# Patient Record
Sex: MALE | Race: BLACK OR AFRICAN AMERICAN | NOT HISPANIC OR LATINO | Employment: FULL TIME | URBAN - METROPOLITAN AREA
[De-identification: names, ages, dates, MRNs, and addresses within clinical notes are randomized per-mention and may not be internally consistent; named-entity substitution may affect disease eponyms.]

---

## 2023-09-30 ENCOUNTER — APPOINTMENT (OUTPATIENT)
Dept: RADIOLOGY | Facility: HOSPITAL | Age: 48
End: 2023-09-30
Payer: COMMERCIAL

## 2023-09-30 ENCOUNTER — HOSPITAL ENCOUNTER (INPATIENT)
Facility: HOSPITAL | Age: 48
LOS: 4 days | Discharge: HOME | End: 2023-10-04
Attending: SURGERY | Admitting: SURGERY
Payer: COMMERCIAL

## 2023-09-30 DIAGNOSIS — J96.00 ACUTE RESPIRATORY FAILURE, UNSPECIFIED WHETHER WITH HYPOXIA OR HYPERCAPNIA (MULTI): ICD-10-CM

## 2023-09-30 DIAGNOSIS — E87.20 METABOLIC ACIDOSIS: ICD-10-CM

## 2023-09-30 DIAGNOSIS — R56.9 SEIZURE (MULTI): ICD-10-CM

## 2023-09-30 DIAGNOSIS — R31.9 HEMATURIA: Primary | ICD-10-CM

## 2023-09-30 DIAGNOSIS — N18.6 ESRD (END STAGE RENAL DISEASE) (MULTI): ICD-10-CM

## 2023-09-30 PROBLEM — Z99.2 ESRD (END STAGE RENAL DISEASE) ON DIALYSIS (MULTI): Chronic | Status: ACTIVE | Noted: 2023-09-30

## 2023-09-30 LAB
ALBUMIN SERPL BCP-MCNC: 3.6 G/DL (ref 3.4–5)
ALBUMIN SERPL BCP-MCNC: 3.8 G/DL (ref 3.4–5)
ALBUMIN SERPL BCP-MCNC: 4.4 G/DL (ref 3.4–5)
ANION GAP BLDA CALCULATED.4IONS-SCNC: 23 MMO/L (ref 10–25)
ANION GAP BLDA CALCULATED.4IONS-SCNC: 24 MMO/L (ref 10–25)
ANION GAP SERPL CALC-SCNC: 25 MMOL/L (ref 10–20)
ANION GAP SERPL CALC-SCNC: 34 MMOL/L (ref 10–20)
ANION GAP SERPL CALC-SCNC: 41 MMOL/L (ref 10–20)
APPARATUS: ABNORMAL
APPARATUS: ABNORMAL
BASE EXCESS BLDA CALC-SCNC: -7.5 MMOL/L (ref -2–3)
BASE EXCESS BLDA CALC-SCNC: -7.8 MMOL/L (ref -2–3)
BASOPHILS # BLD AUTO: 0.06 X10*3/UL (ref 0–0.1)
BASOPHILS NFR BLD AUTO: 0.4 %
BNP SERPL-MCNC: 451 PG/ML (ref 0–99)
BODY TEMPERATURE: 37 DEGREES CELSIUS
BODY TEMPERATURE: 37 DEGREES CELSIUS
BUN SERPL-MCNC: 105 MG/DL (ref 6–23)
BUN SERPL-MCNC: 166 MG/DL (ref 6–23)
BUN SERPL-MCNC: 79 MG/DL (ref 6–23)
CA-I BLDA-SCNC: 0.84 MMOL/L (ref 1.1–1.33)
CA-I BLDA-SCNC: 0.91 MMOL/L (ref 1.1–1.33)
CALCIUM SERPL-MCNC: 4.9 MG/DL (ref 8.6–10.3)
CALCIUM SERPL-MCNC: 6.4 MG/DL (ref 8.6–10.3)
CALCIUM SERPL-MCNC: 6.5 MG/DL (ref 8.6–10.3)
CHLORIDE BLDA-SCNC: 104 MMOL/L (ref 98–107)
CHLORIDE BLDA-SCNC: 105 MMOL/L (ref 98–107)
CHLORIDE SERPL-SCNC: 103 MMOL/L (ref 98–107)
CHLORIDE SERPL-SCNC: 98 MMOL/L (ref 98–107)
CHLORIDE SERPL-SCNC: 99 MMOL/L (ref 98–107)
CO2 SERPL-SCNC: 14 MMOL/L (ref 21–32)
CO2 SERPL-SCNC: 20 MMOL/L (ref 21–32)
CO2 SERPL-SCNC: 6 MMOL/L (ref 21–32)
CREAT SERPL-MCNC: 15.72 MG/DL (ref 0.5–1.3)
CREAT SERPL-MCNC: 18.52 MG/DL (ref 0.5–1.3)
CREAT SERPL-MCNC: >25 MG/DL (ref 0.5–1.3)
EOSINOPHIL # BLD AUTO: 0.02 X10*3/UL (ref 0–0.7)
EOSINOPHIL NFR BLD AUTO: 0.1 %
ERYTHROCYTE [DISTWIDTH] IN BLOOD BY AUTOMATED COUNT: 17.5 % (ref 11.5–14.5)
ERYTHROCYTE [DISTWIDTH] IN BLOOD BY AUTOMATED COUNT: 17.7 % (ref 11.5–14.5)
GFR SERPL CREATININE-BSD FRML MDRD: 3 ML/MIN/1.73M*2
GFR SERPL CREATININE-BSD FRML MDRD: 3 ML/MIN/1.73M*2
GFR SERPL CREATININE-BSD FRML MDRD: ABNORMAL ML/MIN/{1.73_M2}
GLUCOSE BLDA-MCNC: 109 MG/DL (ref 74–99)
GLUCOSE BLDA-MCNC: 129 MG/DL (ref 74–99)
GLUCOSE SERPL-MCNC: 124 MG/DL (ref 74–99)
GLUCOSE SERPL-MCNC: 127 MG/DL (ref 74–99)
GLUCOSE SERPL-MCNC: 133 MG/DL (ref 74–99)
HCO3 BLDA-SCNC: 14.9 MMOL/L (ref 22–26)
HCO3 BLDA-SCNC: 15.3 MMOL/L (ref 22–26)
HCT VFR BLD AUTO: 23 % (ref 41–52)
HCT VFR BLD AUTO: 28.4 % (ref 41–52)
HCT VFR BLD EST: 16 % (ref 41–52)
HCT VFR BLD EST: 34 % (ref 41–52)
HGB BLD-MCNC: 7.5 G/DL (ref 13.5–17.5)
HGB BLD-MCNC: 8.7 G/DL (ref 13.5–17.5)
HGB BLDA-MCNC: 11.4 G/DL (ref 13.5–17.5)
HGB BLDA-MCNC: 5.3 G/DL (ref 13.5–17.5)
IMM GRANULOCYTES # BLD AUTO: 0.82 X10*3/UL (ref 0–0.7)
IMM GRANULOCYTES NFR BLD AUTO: 5.9 % (ref 0–0.9)
INHALED O2 CONCENTRATION: 100 %
INHALED O2 CONCENTRATION: 100 %
INR PPP: 1.2 (ref 0.9–1.1)
LACTATE BLDA-SCNC: 1.6 MMOL/L (ref 0.4–2)
LACTATE BLDA-SCNC: 2 MMOL/L (ref 0.4–2)
LACTATE SERPL-SCNC: 0.9 MMOL/L (ref 0.4–2)
LYMPHOCYTES # BLD AUTO: 0.67 X10*3/UL (ref 1.2–4.8)
LYMPHOCYTES NFR BLD AUTO: 4.9 %
MAGNESIUM SERPL-MCNC: 1.9 MG/DL (ref 1.6–2.4)
MCH RBC QN AUTO: 23.9 PG (ref 26–34)
MCHC RBC AUTO-ENTMCNC: 30.6 G/DL (ref 32–36)
MCHC RBC AUTO-ENTMCNC: 32.6 G/DL (ref 32–36)
MCV RBC AUTO: 73 FL (ref 80–100)
MCV RBC AUTO: 78 FL (ref 80–100)
MONOCYTES # BLD AUTO: 1.17 X10*3/UL (ref 0.1–1)
MONOCYTES NFR BLD AUTO: 8.5 %
NEUTROPHILS # BLD AUTO: 11.05 X10*3/UL (ref 1.2–7.7)
NEUTROPHILS NFR BLD AUTO: 80.2 %
NRBC BLD-RTO: 1.5 /100 WBCS (ref 0–0)
OXYHGB MFR BLDA: 97.8 % (ref 94–98)
OXYHGB MFR BLDA: 98 % (ref 94–98)
PCO2 BLDA: 21 MM HG (ref 38–42)
PCO2 BLDA: 22 MM HG (ref 38–42)
PH BLDA: 7.44 PH (ref 7.38–7.42)
PH BLDA: 7.47 PH (ref 7.38–7.42)
PHOSPHATE SERPL-MCNC: 15.1 MG/DL (ref 2.5–4.9)
PHOSPHATE SERPL-MCNC: 6.5 MG/DL (ref 2.5–4.9)
PHOSPHATE SERPL-MCNC: 7.8 MG/DL (ref 2.5–4.9)
PLATELET # BLD AUTO: 220 X10*3/UL (ref 150–450)
PLATELET # BLD AUTO: 272 X10*3/UL (ref 150–450)
PMV BLD AUTO: 8.8 FL (ref 7.5–11.5)
PMV BLD AUTO: 9.7 FL (ref 7.5–11.5)
PO2 BLDA: 211 MM HG (ref 85–95)
PO2 BLDA: 306 MM HG (ref 85–95)
POTASSIUM BLDA-SCNC: 2.4 MMOL/L (ref 3.5–5.3)
POTASSIUM BLDA-SCNC: 2.6 MMOL/L (ref 3.5–5.3)
POTASSIUM SERPL-SCNC: 2.8 MMOL/L (ref 3.5–5.3)
POTASSIUM SERPL-SCNC: 3 MMOL/L (ref 3.5–5.3)
POTASSIUM SERPL-SCNC: 3.4 MMOL/L (ref 3.5–5.3)
PROTHROMBIN TIME: 13.7 SECONDS (ref 9.8–12.8)
RBC # BLD AUTO: 3.14 X10*6/UL (ref 4.5–5.9)
RBC # BLD AUTO: 3.66 X10*6/UL (ref 4.5–5.9)
SAO2 % BLDA: 100 % (ref 94–100)
SAO2 % BLDA: 100 % (ref 94–100)
SARS-COV-2 RNA RESP QL NAA+PROBE: NOT DETECTED
SODIUM BLDA-SCNC: 140 MMOL/L (ref 136–145)
SODIUM BLDA-SCNC: 141 MMOL/L (ref 136–145)
SODIUM SERPL-SCNC: 140 MMOL/L (ref 136–145)
SODIUM SERPL-SCNC: 144 MMOL/L (ref 136–145)
SODIUM SERPL-SCNC: 147 MMOL/L (ref 136–145)
WBC # BLD AUTO: 13.8 X10*3/UL (ref 4.4–11.3)
WBC # BLD AUTO: 9.5 X10*3/UL (ref 4.4–11.3)

## 2023-09-30 PROCEDURE — 83880 ASSAY OF NATRIURETIC PEPTIDE: CPT | Performed by: SURGERY

## 2023-09-30 PROCEDURE — 82435 ASSAY OF BLOOD CHLORIDE: CPT | Performed by: REGISTERED NURSE

## 2023-09-30 PROCEDURE — 82435 ASSAY OF BLOOD CHLORIDE: CPT | Performed by: SURGERY

## 2023-09-30 PROCEDURE — 36415 COLL VENOUS BLD VENIPUNCTURE: CPT | Performed by: SURGERY

## 2023-09-30 PROCEDURE — 36416 COLLJ CAPILLARY BLOOD SPEC: CPT | Performed by: SURGERY

## 2023-09-30 PROCEDURE — 85027 COMPLETE CBC AUTOMATED: CPT | Performed by: ANESTHESIOLOGY

## 2023-09-30 PROCEDURE — 70450 CT HEAD/BRAIN W/O DYE: CPT

## 2023-09-30 PROCEDURE — 85025 COMPLETE CBC W/AUTO DIFF WBC: CPT | Performed by: SURGERY

## 2023-09-30 PROCEDURE — 83735 ASSAY OF MAGNESIUM: CPT | Performed by: SURGERY

## 2023-09-30 PROCEDURE — 84132 ASSAY OF SERUM POTASSIUM: CPT | Performed by: SURGERY

## 2023-09-30 PROCEDURE — 85610 PROTHROMBIN TIME: CPT | Performed by: SURGERY

## 2023-09-30 PROCEDURE — 87075 CULTR BACTERIA EXCEPT BLOOD: CPT | Mod: AHULAB,CMCLAB | Performed by: SURGERY

## 2023-09-30 PROCEDURE — 82330 ASSAY OF CALCIUM: CPT

## 2023-09-30 PROCEDURE — 2500000004 HC RX 250 GENERAL PHARMACY W/ HCPCS (ALT 636 FOR OP/ED): Performed by: NURSE PRACTITIONER

## 2023-09-30 PROCEDURE — 2500000005 HC RX 250 GENERAL PHARMACY W/O HCPCS

## 2023-09-30 PROCEDURE — 84100 ASSAY OF PHOSPHORUS: CPT | Performed by: SURGERY

## 2023-09-30 PROCEDURE — 83735 ASSAY OF MAGNESIUM: CPT | Performed by: REGISTERED NURSE

## 2023-09-30 PROCEDURE — 2500000005 HC RX 250 GENERAL PHARMACY W/O HCPCS: Performed by: NURSE PRACTITIONER

## 2023-09-30 PROCEDURE — 87040 BLOOD CULTURE FOR BACTERIA: CPT | Mod: AHULAB,CMCLAB | Performed by: SURGERY

## 2023-09-30 PROCEDURE — 99283 EMERGENCY DEPT VISIT LOW MDM: CPT

## 2023-09-30 PROCEDURE — 83605 ASSAY OF LACTIC ACID: CPT | Performed by: SURGERY

## 2023-09-30 PROCEDURE — 2500000004 HC RX 250 GENERAL PHARMACY W/ HCPCS (ALT 636 FOR OP/ED): Performed by: EMERGENCY MEDICINE

## 2023-09-30 PROCEDURE — 2500000004 HC RX 250 GENERAL PHARMACY W/ HCPCS (ALT 636 FOR OP/ED): Performed by: REGISTERED NURSE

## 2023-09-30 PROCEDURE — 80069 RENAL FUNCTION PANEL: CPT | Performed by: NURSE PRACTITIONER

## 2023-09-30 PROCEDURE — 99285 EMERGENCY DEPT VISIT HI MDM: CPT | Mod: 25

## 2023-09-30 PROCEDURE — 2500000004 HC RX 250 GENERAL PHARMACY W/ HCPCS (ALT 636 FOR OP/ED)

## 2023-09-30 PROCEDURE — 71045 X-RAY EXAM CHEST 1 VIEW: CPT

## 2023-09-30 PROCEDURE — 8010000001 HC DIALYSIS - HEMODIALYSIS PER DAY

## 2023-09-30 PROCEDURE — 2020000001 HC ICU ROOM DAILY

## 2023-09-30 PROCEDURE — 99291 CRITICAL CARE FIRST HOUR: CPT | Performed by: ANESTHESIOLOGY

## 2023-09-30 PROCEDURE — 2500000004 HC RX 250 GENERAL PHARMACY W/ HCPCS (ALT 636 FOR OP/ED): Performed by: ANESTHESIOLOGY

## 2023-09-30 PROCEDURE — 82947 ASSAY GLUCOSE BLOOD QUANT: CPT | Performed by: SURGERY

## 2023-09-30 PROCEDURE — 82805 BLOOD GASES W/O2 SATURATION: CPT | Performed by: SURGERY

## 2023-09-30 PROCEDURE — 87635 SARS-COV-2 COVID-19 AMP PRB: CPT | Performed by: SURGERY

## 2023-09-30 PROCEDURE — 36600 WITHDRAWAL OF ARTERIAL BLOOD: CPT

## 2023-09-30 PROCEDURE — 70450 CT HEAD/BRAIN W/O DYE: CPT | Performed by: RADIOLOGY

## 2023-09-30 PROCEDURE — 84075 ASSAY ALKALINE PHOSPHATASE: CPT | Performed by: SURGERY

## 2023-09-30 PROCEDURE — 82805 BLOOD GASES W/O2 SATURATION: CPT

## 2023-09-30 PROCEDURE — 71045 X-RAY EXAM CHEST 1 VIEW: CPT | Performed by: RADIOLOGY

## 2023-09-30 PROCEDURE — 99292 CRITICAL CARE ADDL 30 MIN: CPT | Performed by: ANESTHESIOLOGY

## 2023-09-30 RX ORDER — METOPROLOL TARTRATE 1 MG/ML
5 INJECTION, SOLUTION INTRAVENOUS ONCE
Status: COMPLETED | OUTPATIENT
Start: 2023-09-30 | End: 2023-09-30

## 2023-09-30 RX ORDER — MORPHINE SULFATE 2 MG/ML
INJECTION, SOLUTION INTRAMUSCULAR; INTRAVENOUS
Status: DISPENSED
Start: 2023-09-30 | End: 2023-09-30

## 2023-09-30 RX ORDER — LEVETIRACETAM 10 MG/ML
1000 INJECTION INTRAVASCULAR ONCE
Status: COMPLETED | OUTPATIENT
Start: 2023-09-30 | End: 2023-09-30

## 2023-09-30 RX ORDER — SODIUM BICARBONATE 1 MEQ/ML
SYRINGE (ML) INTRAVENOUS
Status: DISPENSED
Start: 2023-09-30 | End: 2023-09-30

## 2023-09-30 RX ORDER — NICARDIPINE HYDROCHLORIDE 0.2 MG/ML
INJECTION INTRAVENOUS
Status: COMPLETED
Start: 2023-09-30 | End: 2023-09-30

## 2023-09-30 RX ORDER — AMIODARONE HYDROCHLORIDE 150 MG/3ML
150 INJECTION, SOLUTION INTRAVENOUS ONCE
Status: DISCONTINUED | OUTPATIENT
Start: 2023-09-30 | End: 2023-09-30 | Stop reason: ALTCHOICE

## 2023-09-30 RX ORDER — METOPROLOL TARTRATE 1 MG/ML
INJECTION, SOLUTION INTRAVENOUS
Status: COMPLETED
Start: 2023-09-30 | End: 2023-09-30

## 2023-09-30 RX ORDER — METOPROLOL TARTRATE 1 MG/ML
5 INJECTION, SOLUTION INTRAVENOUS ONCE
Status: DISCONTINUED | OUTPATIENT
Start: 2023-09-30 | End: 2023-10-01

## 2023-09-30 RX ORDER — POTASSIUM CHLORIDE 14.9 MG/ML
20 INJECTION INTRAVENOUS
Status: COMPLETED | OUTPATIENT
Start: 2023-09-30 | End: 2023-10-01

## 2023-09-30 RX ORDER — HYDRALAZINE HYDROCHLORIDE 20 MG/ML
10 INJECTION INTRAMUSCULAR; INTRAVENOUS EVERY 4 HOURS PRN
Status: DISCONTINUED | OUTPATIENT
Start: 2023-09-30 | End: 2023-10-04 | Stop reason: HOSPADM

## 2023-09-30 RX ORDER — PROPOFOL 10 MG/ML
INJECTION, EMULSION INTRAVENOUS
Status: DISPENSED
Start: 2023-09-30 | End: 2023-09-30

## 2023-09-30 RX ORDER — SODIUM BICARBONATE 1 MEQ/ML
100 SYRINGE (ML) INTRAVENOUS ONCE
Status: DISCONTINUED | OUTPATIENT
Start: 2023-09-30 | End: 2023-10-02

## 2023-09-30 RX ORDER — MAGNESIUM SULFATE HEPTAHYDRATE 40 MG/ML
2 INJECTION, SOLUTION INTRAVENOUS ONCE
Status: COMPLETED | OUTPATIENT
Start: 2023-09-30 | End: 2023-09-30

## 2023-09-30 RX ORDER — LEVETIRACETAM 5 MG/ML
500 INJECTION INTRAVASCULAR EVERY 12 HOURS
Status: DISCONTINUED | OUTPATIENT
Start: 2023-10-01 | End: 2023-10-04 | Stop reason: HOSPADM

## 2023-09-30 RX ORDER — POTASSIUM CHLORIDE 14.9 MG/ML
20 INJECTION INTRAVENOUS
Status: COMPLETED | OUTPATIENT
Start: 2023-09-30 | End: 2023-09-30

## 2023-09-30 RX ORDER — LEVETIRACETAM 500 MG/5ML
1000 INJECTION, SOLUTION, CONCENTRATE INTRAVENOUS ONCE
Status: DISCONTINUED | OUTPATIENT
Start: 2023-09-30 | End: 2023-09-30 | Stop reason: ALTCHOICE

## 2023-09-30 RX ORDER — HYDRALAZINE HYDROCHLORIDE 20 MG/ML
INJECTION INTRAMUSCULAR; INTRAVENOUS
Status: DISPENSED
Start: 2023-09-30 | End: 2023-09-30

## 2023-09-30 RX ORDER — METOPROLOL TARTRATE 1 MG/ML
5 INJECTION, SOLUTION INTRAVENOUS EVERY 6 HOURS
Status: DISCONTINUED | OUTPATIENT
Start: 2023-09-30 | End: 2023-10-01

## 2023-09-30 RX ORDER — MAGNESIUM SULFATE HEPTAHYDRATE 40 MG/ML
INJECTION, SOLUTION INTRAVENOUS
Status: DISPENSED
Start: 2023-09-30 | End: 2023-10-01

## 2023-09-30 RX ORDER — METOPROLOL TARTRATE 1 MG/ML
INJECTION, SOLUTION INTRAVENOUS
Status: DISPENSED
Start: 2023-09-30 | End: 2023-09-30

## 2023-09-30 RX ORDER — NICARDIPINE HYDROCHLORIDE 0.2 MG/ML
2.5-15 INJECTION INTRAVENOUS CONTINUOUS
Status: DISCONTINUED | OUTPATIENT
Start: 2023-09-30 | End: 2023-10-01

## 2023-09-30 RX ORDER — LORAZEPAM 2 MG/ML
INJECTION INTRAMUSCULAR
Status: DISPENSED
Start: 2023-09-30 | End: 2023-09-30

## 2023-09-30 RX ADMIN — AMIODARONE HYDROCHLORIDE 1 MG/MIN: 1.8 INJECTION, SOLUTION INTRAVENOUS at 13:28

## 2023-09-30 RX ADMIN — AMIODARONE HYDROCHLORIDE 150 MG: 1.5 INJECTION, SOLUTION INTRAVENOUS at 13:15

## 2023-09-30 RX ADMIN — AMIODARONE HYDROCHLORIDE 150 MG: 1.5 INJECTION, SOLUTION INTRAVENOUS at 12:00

## 2023-09-30 RX ADMIN — MAGNESIUM SULFATE HEPTAHYDRATE 2 G: 40 INJECTION, SOLUTION INTRAVENOUS at 22:52

## 2023-09-30 RX ADMIN — POTASSIUM CHLORIDE 20 MEQ: 14.9 INJECTION, SOLUTION INTRAVENOUS at 17:30

## 2023-09-30 RX ADMIN — AMIODARONE HYDROCHLORIDE 1 MG/MIN: 1.8 INJECTION, SOLUTION INTRAVENOUS at 18:08

## 2023-09-30 RX ADMIN — CALCIUM GLUCONATE 1 G: 94 INJECTION, SOLUTION INTRAVENOUS at 08:50

## 2023-09-30 RX ADMIN — METOPROLOL TARTRATE 5 MG: 1 INJECTION, SOLUTION INTRAVENOUS at 17:45

## 2023-09-30 RX ADMIN — POTASSIUM CHLORIDE 20 MEQ: 14.9 INJECTION, SOLUTION INTRAVENOUS at 15:30

## 2023-09-30 RX ADMIN — POTASSIUM CHLORIDE 20 MEQ: 14.9 INJECTION, SOLUTION INTRAVENOUS at 21:23

## 2023-09-30 RX ADMIN — AMIODARONE HYDROCHLORIDE 150 MG: 1.5 INJECTION, SOLUTION INTRAVENOUS at 18:00

## 2023-09-30 RX ADMIN — SODIUM BICARBONATE 150 ML/HR: 84 INJECTION, SOLUTION INTRAVENOUS at 20:45

## 2023-09-30 RX ADMIN — LEVETIRACETAM 1000 MG: 500 INJECTION, SOLUTION, CONCENTRATE INTRAVENOUS at 13:00

## 2023-09-30 RX ADMIN — NICARDIPINE HYDROCHLORIDE 2.5 MG/HR: 0.2 INJECTION, SOLUTION INTRAVENOUS at 14:30

## 2023-09-30 RX ADMIN — NICARDIPINE HYDROCHLORIDE 2.5 MG/HR: 0.2 INJECTION INTRAVENOUS at 14:30

## 2023-09-30 RX ADMIN — METOPROLOL TARTRATE 5 MG: 5 INJECTION INTRAVENOUS at 22:55

## 2023-09-30 RX ADMIN — POTASSIUM CHLORIDE 20 MEQ: 14.9 INJECTION, SOLUTION INTRAVENOUS at 13:30

## 2023-09-30 RX ADMIN — AMIODARONE HYDROCHLORIDE 1 MG/MIN: 1.8 INJECTION, SOLUTION INTRAVENOUS at 12:00

## 2023-09-30 RX ADMIN — LEVETIRACETAM 1000 MG: 10 INJECTION INTRAVENOUS at 13:15

## 2023-09-30 RX ADMIN — POTASSIUM CHLORIDE 20 MEQ: 14.9 INJECTION, SOLUTION INTRAVENOUS at 23:15

## 2023-09-30 ASSESSMENT — COGNITIVE AND FUNCTIONAL STATUS - GENERAL
STANDING UP FROM CHAIR USING ARMS: A LITTLE
WALKING IN HOSPITAL ROOM: A LITTLE
CLIMB 3 TO 5 STEPS WITH RAILING: A LOT
MOBILITY SCORE: 19
MOVING TO AND FROM BED TO CHAIR: A LITTLE

## 2023-09-30 NOTE — CONSULTS
Reason For Consult   need for acute dialysis    History Of Present Illness  Soren Howard is a 48 y.o. male presenting with  with history of ESRD missing at least 3 hemodialysis treatments.     Past Medical History  He has a past medical history of Angiodysplasia of stomach and duodenum without bleeding, COVID-19, Diverticulosis of intestine, part unspecified, without perforation or abscess without bleeding, End stage renal disease (CMS/HCC) (08/02/2021), Other hemorrhoids, Personal history of diseases of the blood and blood-forming organs and certain disorders involving the immune mechanism, Personal history of other diseases of the circulatory system, Personal history of other diseases of the musculoskeletal system and connective tissue, Personal history of other endocrine, nutritional and metabolic disease, Personal history of other medical treatment, Residual hemorrhoidal skin tags, Ulcer of anus and rectum, and Unspecified osteoarthritis, unspecified site.    Surgical History  He has a past surgical history that includes Other surgical history (01/18/2022); Other surgical history (01/19/2022); CT angio abdomen pelvis w and or wo IV IV contrast (5/24/2023); and IR venogram dialysis (5/26/2023).     Social History  He has no history on file for tobacco use, alcohol use, and drug use.    Family History  No family history on file.     Allergies  Patient has no active allergies.    Review of Systems   completed     Physical Exam   GEN appearance; awake alert/ very jittery with myoclonic jerks.    head and ENT: normocephalic atraumatic   supple neck no JVD   lungs bilateral crackles up to mid lung fields   heart regular rhythm no friction singular   abdomen soft milligram renal tenderness   Extremities, positive bruit and thrill in the AV fistula for dialysis   Neurologic, myoclonic jerks probably from uremia          I&O 24HR  No intake or output data in the 24 hours ending 09/30/23 1118    Vitals 24HR  Heart Rate:   []   Temp:  [36.3 °C (97.4 °F)]   SpO2:  [100 %]     Pending    Relevant Results     all labs are reviewed, dialysis orders were placed       Assessment/Plan         1. ESRD on hemodialysis, missing at least 3 dialysis treatments   we will dialyze ASAP today  2. history of hypertension we will continue same medications  3. metabolic bone disease will continue phosphate binders and vitamin D    Principal Problem:    Hematuria      I spent 20 minutes in the professional and overall care of this patient.      Lonny Armstrong MD

## 2023-09-30 NOTE — POST-PROCEDURE NOTE
A time out was preformed identifying the correct procedure, and the correct location with the nursing staff.  The right wrist was prepped with 2% chlorhexidine and draped with a full length sterile sheet in the usual fashion.  1% lidocaine was administered subcutaneously for local anesthesia.  The right radial artery was accessed under ultrasound guidance with an 18 gauge thin wall needle.  An arterial catheter was inserted over a guide wire via the Seldinger technique.  pulsatile blood present.  The catheter was sutured in place and a sterile dressing was applied over the site prior to removal of drapes.     Patient tolerated well, no complications.

## 2023-10-01 LAB
ALBUMIN SERPL BCP-MCNC: 3.4 G/DL (ref 3.4–5)
ALBUMIN SERPL BCP-MCNC: 4.5 G/DL (ref 3.4–5)
ALP SERPL-CCNC: 64 U/L (ref 33–120)
ALT SERPL W P-5'-P-CCNC: 15 U/L (ref 10–52)
ANION GAP BLDV CALCULATED.4IONS-SCNC: 32 MMOL/L (ref 10–25)
ANION GAP BLDV CALCULATED.4IONS-SCNC: 32 MMOL/L (ref 10–25)
ANION GAP SERPL CALC-SCNC: 24 MMOL/L (ref 10–20)
ANION GAP SERPL CALC-SCNC: 37 MMOL/L (ref 10–20)
AST SERPL W P-5'-P-CCNC: 30 U/L (ref 9–39)
BASE EXCESS BLDV CALC-SCNC: -22.9 MMOL/L (ref -2–3)
BASE EXCESS BLDV CALC-SCNC: -25.2 MMOL/L (ref -2–3)
BILIRUB DIRECT SERPL-MCNC: 0.1 MG/DL (ref 0–0.3)
BILIRUB SERPL-MCNC: 0.3 MG/DL (ref 0–1.2)
BODY TEMPERATURE: 37 DEGREES CELSIUS
BODY TEMPERATURE: 37 DEGREES CELSIUS
BUN SERPL-MCNC: 157 MG/DL (ref 6–23)
BUN SERPL-MCNC: 78 MG/DL (ref 6–23)
CA-I BLDV-SCNC: 0.62 MMOL/L (ref 1.1–1.33)
CA-I BLDV-SCNC: 0.63 MMOL/L (ref 1.1–1.33)
CALCIUM SERPL-MCNC: 5.1 MG/DL (ref 8.6–10.3)
CALCIUM SERPL-MCNC: 6 MG/DL (ref 8.6–10.3)
CHLORIDE BLDV-SCNC: 106 MMOL/L (ref 98–107)
CHLORIDE BLDV-SCNC: 106 MMOL/L (ref 98–107)
CHLORIDE SERPL-SCNC: 103 MMOL/L (ref 98–107)
CHLORIDE SERPL-SCNC: 98 MMOL/L (ref 98–107)
CO2 SERPL-SCNC: 22 MMOL/L (ref 21–32)
CO2 SERPL-SCNC: 3 MMOL/L (ref 21–32)
CREAT SERPL-MCNC: 16.38 MG/DL (ref 0.5–1.3)
CREAT SERPL-MCNC: >25 MG/DL (ref 0.5–1.3)
ERYTHROCYTE [DISTWIDTH] IN BLOOD BY AUTOMATED COUNT: 17.6 % (ref 11.5–14.5)
GFR SERPL CREATININE-BSD FRML MDRD: 3 ML/MIN/1.73M*2
GFR SERPL CREATININE-BSD FRML MDRD: ABNORMAL ML/MIN/{1.73_M2}
GLUCOSE BLD MANUAL STRIP-MCNC: 124 MG/DL (ref 74–99)
GLUCOSE BLDV-MCNC: 122 MG/DL (ref 74–99)
GLUCOSE BLDV-MCNC: 96 MG/DL (ref 74–99)
GLUCOSE SERPL-MCNC: 129 MG/DL (ref 74–99)
GLUCOSE SERPL-MCNC: 96 MG/DL (ref 74–99)
HCO3 BLDV-SCNC: 4 MMOL/L (ref 22–26)
HCO3 BLDV-SCNC: 4.9 MMOL/L (ref 22–26)
HCT VFR BLD AUTO: 20.7 % (ref 41–52)
HCT VFR BLD EST: 25 % (ref 41–52)
HCT VFR BLD EST: 26 % (ref 41–52)
HGB BLD-MCNC: 6.7 G/DL (ref 13.5–17.5)
HGB BLDV-MCNC: 8.3 G/DL (ref 13.5–17.5)
HGB BLDV-MCNC: 8.8 G/DL (ref 13.5–17.5)
INHALED O2 CONCENTRATION: 21 %
INHALED O2 CONCENTRATION: 21 %
LACTATE BLDV-SCNC: 1.3 MMOL/L (ref 0.4–2)
LACTATE BLDV-SCNC: 1.4 MMOL/L (ref 0.4–2)
MAGNESIUM SERPL-MCNC: 2.2 MG/DL (ref 1.6–2.4)
MAGNESIUM SERPL-MCNC: 2.7 MG/DL (ref 1.6–2.4)
MAGNESIUM SERPL-MCNC: 3.4 MG/DL (ref 1.6–2.4)
MCH RBC QN AUTO: 23.8 PG (ref 26–34)
MCHC RBC AUTO-ENTMCNC: 32.4 G/DL (ref 32–36)
MCV RBC AUTO: 74 FL (ref 80–100)
NRBC BLD-RTO: 1.2 /100 WBCS (ref 0–0)
OXYHGB MFR BLDV: 77.5 % (ref 45–75)
OXYHGB MFR BLDV: 79.6 % (ref 45–75)
PCO2 BLDV: 16 MM HG (ref 41–51)
PCO2 BLDV: 16 MM HG (ref 41–51)
PH BLDV: 7.01 PH (ref 7.33–7.43)
PH BLDV: 7.09 PH (ref 7.33–7.43)
PHOSPHATE SERPL-MCNC: 17.1 MG/DL (ref 2.5–4.9)
PHOSPHATE SERPL-MCNC: 6.2 MG/DL (ref 2.5–4.9)
PLATELET # BLD AUTO: 223 X10*3/UL (ref 150–450)
PMV BLD AUTO: 9.9 FL (ref 7.5–11.5)
PO2 BLDV: 52 MM HG (ref 35–45)
PO2 BLDV: 53 MM HG (ref 35–45)
POTASSIUM BLDV-SCNC: 4 MMOL/L (ref 3.5–5.3)
POTASSIUM BLDV-SCNC: 4.4 MMOL/L (ref 3.5–5.3)
POTASSIUM SERPL-SCNC: 2.7 MMOL/L (ref 3.5–5.3)
POTASSIUM SERPL-SCNC: 3.2 MMOL/L (ref 3.5–5.3)
POTASSIUM SERPL-SCNC: 4.3 MMOL/L (ref 3.5–5.3)
PROT SERPL-MCNC: 8 G/DL (ref 6.4–8.2)
RBC # BLD AUTO: 2.81 X10*6/UL (ref 4.5–5.9)
SAO2 % BLDV: 78 % (ref 45–75)
SAO2 % BLDV: 81 % (ref 45–75)
SODIUM BLDV-SCNC: 138 MMOL/L (ref 136–145)
SODIUM BLDV-SCNC: 139 MMOL/L (ref 136–145)
SODIUM SERPL-SCNC: 139 MMOL/L (ref 136–145)
SODIUM SERPL-SCNC: 141 MMOL/L (ref 136–145)
WBC # BLD AUTO: 10.1 X10*3/UL (ref 4.4–11.3)

## 2023-10-01 PROCEDURE — 2500000004 HC RX 250 GENERAL PHARMACY W/ HCPCS (ALT 636 FOR OP/ED): Performed by: EMERGENCY MEDICINE

## 2023-10-01 PROCEDURE — 2020000001 HC ICU ROOM DAILY

## 2023-10-01 PROCEDURE — 2500000004 HC RX 250 GENERAL PHARMACY W/ HCPCS (ALT 636 FOR OP/ED): Performed by: NURSE PRACTITIONER

## 2023-10-01 PROCEDURE — 36415 COLL VENOUS BLD VENIPUNCTURE: CPT | Performed by: SURGERY

## 2023-10-01 PROCEDURE — 2500000002 HC RX 250 W HCPCS SELF ADMINISTERED DRUGS (ALT 637 FOR MEDICARE OP, ALT 636 FOR OP/ED): Performed by: NURSE PRACTITIONER

## 2023-10-01 PROCEDURE — 36415 COLL VENOUS BLD VENIPUNCTURE: CPT | Performed by: NURSE PRACTITIONER

## 2023-10-01 PROCEDURE — 99254 IP/OBS CNSLTJ NEW/EST MOD 60: CPT | Performed by: STUDENT IN AN ORGANIZED HEALTH CARE EDUCATION/TRAINING PROGRAM

## 2023-10-01 PROCEDURE — 99291 CRITICAL CARE FIRST HOUR: CPT | Performed by: NURSE PRACTITIONER

## 2023-10-01 PROCEDURE — 80069 RENAL FUNCTION PANEL: CPT | Performed by: REGISTERED NURSE

## 2023-10-01 PROCEDURE — 2500000005 HC RX 250 GENERAL PHARMACY W/O HCPCS: Performed by: NURSE PRACTITIONER

## 2023-10-01 PROCEDURE — 85027 COMPLETE CBC AUTOMATED: CPT | Performed by: NURSE PRACTITIONER

## 2023-10-01 PROCEDURE — 2500000001 HC RX 250 WO HCPCS SELF ADMINISTERED DRUGS (ALT 637 FOR MEDICARE OP): Performed by: NURSE PRACTITIONER

## 2023-10-01 PROCEDURE — 84132 ASSAY OF SERUM POTASSIUM: CPT | Performed by: NURSE PRACTITIONER

## 2023-10-01 PROCEDURE — 83735 ASSAY OF MAGNESIUM: CPT | Performed by: NURSE PRACTITIONER

## 2023-10-01 PROCEDURE — 87493 C DIFF AMPLIFIED PROBE: CPT | Mod: AHULAB,CMCLAB | Performed by: NURSE PRACTITIONER

## 2023-10-01 PROCEDURE — 2500000004 HC RX 250 GENERAL PHARMACY W/ HCPCS (ALT 636 FOR OP/ED): Performed by: ANESTHESIOLOGY

## 2023-10-01 RX ORDER — HYDRALAZINE HYDROCHLORIDE 50 MG/1
50 TABLET, FILM COATED ORAL 2 TIMES DAILY
Status: DISCONTINUED | OUTPATIENT
Start: 2023-10-01 | End: 2023-10-04 | Stop reason: HOSPADM

## 2023-10-01 RX ORDER — SEVELAMER CARBONATE 800 MG/1
800 TABLET, FILM COATED ORAL
COMMUNITY
Start: 2022-08-03 | End: 2024-01-04 | Stop reason: WASHOUT

## 2023-10-01 RX ORDER — ATORVASTATIN CALCIUM 80 MG/1
1 TABLET, FILM COATED ORAL NIGHTLY
COMMUNITY
Start: 2019-06-01

## 2023-10-01 RX ORDER — CARVEDILOL 25 MG/1
25 TABLET ORAL 2 TIMES DAILY
Status: DISCONTINUED | OUTPATIENT
Start: 2023-10-01 | End: 2023-10-04 | Stop reason: HOSPADM

## 2023-10-01 RX ORDER — AMIODARONE HYDROCHLORIDE 200 MG/1
400 TABLET ORAL 2 TIMES DAILY
Status: DISCONTINUED | OUTPATIENT
Start: 2023-10-01 | End: 2023-10-03

## 2023-10-01 RX ORDER — POTASSIUM CHLORIDE 14.9 MG/ML
20 INJECTION INTRAVENOUS ONCE
Status: COMPLETED | OUTPATIENT
Start: 2023-10-01 | End: 2023-10-01

## 2023-10-01 RX ORDER — POTASSIUM CHLORIDE 14.9 MG/ML
20 INJECTION INTRAVENOUS
Status: COMPLETED | OUTPATIENT
Start: 2023-10-01 | End: 2023-10-01

## 2023-10-01 RX ORDER — FAMOTIDINE 20 MG/1
20 TABLET, FILM COATED ORAL DAILY
Status: DISCONTINUED | OUTPATIENT
Start: 2023-10-01 | End: 2023-10-02

## 2023-10-01 RX ORDER — FAMOTIDINE 10 MG/ML
20 INJECTION INTRAVENOUS DAILY
Status: DISCONTINUED | OUTPATIENT
Start: 2023-10-01 | End: 2023-10-02

## 2023-10-01 RX ORDER — AMLODIPINE BESYLATE 10 MG/1
10 TABLET ORAL DAILY
Status: DISCONTINUED | OUTPATIENT
Start: 2023-10-01 | End: 2023-10-04 | Stop reason: HOSPADM

## 2023-10-01 RX ORDER — ACETAMINOPHEN 500 MG
50 TABLET ORAL DAILY
COMMUNITY
Start: 2022-09-01

## 2023-10-01 RX ORDER — MAGNESIUM SULFATE HEPTAHYDRATE 40 MG/ML
4 INJECTION, SOLUTION INTRAVENOUS ONCE
Status: COMPLETED | OUTPATIENT
Start: 2023-10-01 | End: 2023-10-01

## 2023-10-01 RX ORDER — POTASSIUM CHLORIDE 1.5 G/1.58G
40 POWDER, FOR SOLUTION ORAL
Status: DISCONTINUED | OUTPATIENT
Start: 2023-10-01 | End: 2023-10-01

## 2023-10-01 RX ORDER — HYDRALAZINE HYDROCHLORIDE 50 MG/1
25 TABLET, FILM COATED ORAL 2 TIMES DAILY
COMMUNITY
Start: 2021-11-19 | End: 2024-01-04 | Stop reason: SDUPTHER

## 2023-10-01 RX ORDER — ATORVASTATIN CALCIUM 80 MG/1
80 TABLET, FILM COATED ORAL DAILY
Status: DISCONTINUED | OUTPATIENT
Start: 2023-10-01 | End: 2023-10-04 | Stop reason: HOSPADM

## 2023-10-01 RX ORDER — CARVEDILOL 25 MG/1
25 TABLET ORAL EVERY 12 HOURS
COMMUNITY
End: 2025-07-17

## 2023-10-01 RX ORDER — POTASSIUM CHLORIDE 1.5 G/1.58G
POWDER, FOR SOLUTION ORAL
Status: DISPENSED
Start: 2023-10-01 | End: 2023-10-01

## 2023-10-01 RX ORDER — ALLOPURINOL 100 MG/1
1 TABLET ORAL DAILY
COMMUNITY

## 2023-10-01 RX ORDER — POTASSIUM CHLORIDE 20 MEQ/1
40 TABLET, EXTENDED RELEASE ORAL
Status: DISCONTINUED | OUTPATIENT
Start: 2023-10-01 | End: 2023-10-01

## 2023-10-01 RX ORDER — SEVELAMER CARBONATE 800 MG/1
800 TABLET, FILM COATED ORAL
Status: DISCONTINUED | OUTPATIENT
Start: 2023-10-01 | End: 2023-10-02

## 2023-10-01 RX ORDER — AMLODIPINE BESYLATE 10 MG/1
1 TABLET ORAL DAILY
COMMUNITY
Start: 2021-09-16 | End: 2025-07-17

## 2023-10-01 RX ADMIN — POTASSIUM CHLORIDE 20 MEQ: 14.9 INJECTION, SOLUTION INTRAVENOUS at 13:36

## 2023-10-01 RX ADMIN — AMIODARONE HYDROCHLORIDE 400 MG: 200 TABLET ORAL at 20:29

## 2023-10-01 RX ADMIN — AMLODIPINE BESYLATE 10 MG: 10 TABLET ORAL at 08:32

## 2023-10-01 RX ADMIN — FAMOTIDINE 20 MG: 20 TABLET, FILM COATED ORAL at 08:39

## 2023-10-01 RX ADMIN — METOPROLOL TARTRATE 5 MG: 5 INJECTION INTRAVENOUS at 05:43

## 2023-10-01 RX ADMIN — SODIUM BICARBONATE 150 ML/HR: 84 INJECTION, SOLUTION INTRAVENOUS at 06:53

## 2023-10-01 RX ADMIN — POTASSIUM CHLORIDE 20 MEQ: 14.9 INJECTION, SOLUTION INTRAVENOUS at 18:08

## 2023-10-01 RX ADMIN — CARVEDILOL 25 MG: 25 TABLET, FILM COATED ORAL at 08:39

## 2023-10-01 RX ADMIN — LEVETIRACETAM 500 MG: 5 INJECTION INTRAVENOUS at 13:27

## 2023-10-01 RX ADMIN — MAGNESIUM SULFATE HEPTAHYDRATE 4 G: 40 INJECTION, SOLUTION INTRAVENOUS at 08:31

## 2023-10-01 RX ADMIN — POTASSIUM CHLORIDE 20 MEQ: 14.9 INJECTION, SOLUTION INTRAVENOUS at 09:45

## 2023-10-01 RX ADMIN — CARVEDILOL 25 MG: 25 TABLET, FILM COATED ORAL at 20:29

## 2023-10-01 RX ADMIN — AMIODARONE HYDROCHLORIDE 1 MG/MIN: 1.8 INJECTION, SOLUTION INTRAVENOUS at 00:27

## 2023-10-01 RX ADMIN — AMIODARONE HYDROCHLORIDE 400 MG: 200 TABLET ORAL at 08:32

## 2023-10-01 RX ADMIN — POTASSIUM CHLORIDE 20 MEQ: 14.9 INJECTION, SOLUTION INTRAVENOUS at 11:49

## 2023-10-01 RX ADMIN — HYDRALAZINE HYDROCHLORIDE 10 MG: 20 INJECTION INTRAMUSCULAR; INTRAVENOUS at 03:16

## 2023-10-01 RX ADMIN — ATORVASTATIN CALCIUM 80 MG: 80 TABLET, FILM COATED ORAL at 08:32

## 2023-10-01 RX ADMIN — LEVETIRACETAM 500 MG: 5 INJECTION INTRAVENOUS at 01:00

## 2023-10-01 RX ADMIN — HYDRALAZINE HYDROCHLORIDE 50 MG: 50 TABLET ORAL at 20:29

## 2023-10-01 RX ADMIN — SEVELAMER CARBONATE 800 MG: 800 TABLET, FILM COATED ORAL at 11:47

## 2023-10-01 RX ADMIN — AMIODARONE HYDROCHLORIDE 1 MG/MIN: 1.8 INJECTION, SOLUTION INTRAVENOUS at 06:51

## 2023-10-01 RX ADMIN — SEVELAMER CARBONATE 800 MG: 800 TABLET, FILM COATED ORAL at 08:31

## 2023-10-01 RX ADMIN — HYDRALAZINE HYDROCHLORIDE 50 MG: 50 TABLET ORAL at 08:32

## 2023-10-01 ASSESSMENT — PAIN - FUNCTIONAL ASSESSMENT
PAIN_FUNCTIONAL_ASSESSMENT: 0-10

## 2023-10-01 ASSESSMENT — PAIN SCALES - GENERAL
PAINLEVEL_OUTOF10: 0 - NO PAIN

## 2023-10-01 NOTE — PROGRESS NOTES
Soren Howard is a 48 y.o. male on day 1 of admission presenting with Hematuria.      Subjective   Doing well  with no new complaints       Objective        Vitals 24HR  Heart Rate:  []   Temp:  [36.4 °C (97.5 °F)-36.7 °C (98 °F)]   Resp:  [10-23]   BP: (141-166)/()   Weight:  [96.4 kg (212 lb 8.4 oz)]   SpO2:  [92 %-100 %]     Intake/Output last 3 Shifts:    Intake/Output Summary (Last 24 hours) at 10/1/2023 1836  Last data filed at 10/1/2023 1536  Gross per 24 hour   Intake 2768.05 ml   Output --   Net 2768.05 ml       Physical Exam    Relevant Results     Results from last 7 days   Lab Units 10/01/23  0527 09/30/23  1716 09/30/23  0053   WBC AUTO x10*3/uL 10.1 9.5 13.8*   HEMOGLOBIN g/dL 6.7* 7.5* 8.7*   HEMATOCRIT % 20.7* 23.0* 28.4*   PLATELETS AUTO x10*3/uL 223 220 272      Results from last 7 days   Lab Units 10/01/23  1552 10/01/23  0527 09/30/23  2015 09/30/23  1213   SODIUM mmol/L  --  141 140 144   POTASSIUM mmol/L 3.2* 2.7* 3.0* 2.8*   CHLORIDE mmol/L  --  98 98 99   CO2 mmol/L  --  22 20* 14*   BUN mg/dL  --  78* 79* 105*   CREATININE mg/dL  --  16.38* 15.72* 18.52*   GLUCOSE mg/dL  --  129* 127* 124*   CALCIUM mg/dL  --  6.0* 6.4* 6.5*        Current Facility-Administered Medications:     amiodarone (Pacerone) tablet 400 mg, 400 mg, oral, BID, ZANDER Eric, 400 mg at 10/01/23 0832    amLODIPine (Norvasc) tablet 10 mg, 10 mg, oral, Daily, ZANDER Eric, 10 mg at 10/01/23 0832    atorvastatin (Lipitor) tablet 80 mg, 80 mg, oral, Daily, ROJAS Eric-CNP, 80 mg at 10/01/23 0832    carvedilol (Coreg) tablet 25 mg, 25 mg, oral, BID, ROJAS Eric-CNP, 25 mg at 10/01/23 0839    famotidine (Pepcid) tablet 20 mg, 20 mg, oral, Daily, 20 mg at 10/01/23 0839 **OR** famotidine PF (Pepcid) injection 20 mg, 20 mg, intravenous, Daily, Aidan Burgos APRN-CNP    hydrALAZINE (Apresoline) injection 10 mg, 10 mg, intravenous, q4h PRN, ROJAS Eric-CNP, 10 mg  at 10/01/23 0316    hydrALAZINE (Apresoline) tablet 50 mg, 50 mg, oral, BID, ROJAS Eric-CNP, 50 mg at 10/01/23 0832    levETIRAcetam in NaCl (iso-os) (Keppra)  mg, 500 mg, intravenous, q12h, Tatiana Kaminski MD, Stopped at 10/01/23 1400    piperacillin-tazobactam-dextrose (Zosyn) IV 4.5 g, 4.5 g, intravenous, Once, Meli Portillo MD    potassium chloride IV 20 mEq, 20 mEq, intravenous, Once, ROJAS Eric-CNP, Last Rate: 100 mL/hr at 10/01/23 1808, 20 mEq at 10/01/23 1808    sevelamer carbonate (Renvela) tablet 800 mg, 800 mg, oral, TID with meals, ROJAS Eric-CNP, 800 mg at 10/01/23 1147    sodium bicarbonate 8.4 % (1 mEq/mL) 100 mEq, 100 mEq, intravenous, Once, Meli Portillo MD    vancomycin (Vancocin) in dextrose 5 % water (D5W) 500 mL IV 1,500 mg, 1,500 mg, intravenous, Once, Meli Portillo MD           Assessment/Plan   This patient currently has cardiac telemetry ordered; if you would like to modify or discontinue the telemetry order, click here to go to the orders activity to modify/discontinue the order.          Active Problems:    ESRD (end stage renal disease) on dialysis (CMS/[facility])    Seizure (CMS/[facility])    Hemodialysis tomorrow          I spent 20 minutes in the professional and overall care of this patient.      Lonny Armstrong MD

## 2023-10-01 NOTE — CONSULTS
"History Of Present Illness  Soren Howard is a 48 y.o. AA man with ESRD on HD, HTN, Gout, and angiodysplasia of stomach/duodenum who presented on 9/30/23 for complaints of overall not feeling well. Neurology consulted for seizure.     He had missed his HD the entire week prior to his admission and came to the ED for generalized fatigue and overall feeling unwell. States that he missed dialysis as he overall didn't feel \"well enough to go.\" Denies fever, chills, focal neurologic weakness or sensory changes. Denies speech change, vision loss, headache or any history of seizure.     He was admitted to the ICU for initiation of dialysis. Prior to dialysis starting, he was noted to have a generalized seizure that was witnessed by the RN. He mentions that the patient had right face and arm clonic activity followed by bilateral arm clonic activity and then eyes open, rolled back, and patient started to fall from the side of the bed but the staff was able to grab him in time. The episode lasted roughly 1 to 1.5 minutes. Self aborted. But given 2mg Ativan after resolution. No noted urinary/bowel incontinence or tongue biting. No history of seizure.     BUN  noted to be elevated at 157.     CTH personally reviewed and with small vessel disease but no acute infarction.     Past Medical History  Past Medical History:   Diagnosis Date    Angiodysplasia of stomach and duodenum without bleeding     Gastric AV malformation    COVID-19     COVID-19 virus infection    Diverticulosis of intestine, part unspecified, without perforation or abscess without bleeding     Diverticulosis    End stage renal disease (CMS/[facility]) 08/02/2021    ESRD (end stage renal disease)    Other hemorrhoids     Internal hemorrhoid    Personal history of diseases of the blood and blood-forming organs and certain disorders involving the immune mechanism     History of anemia    Personal history of other diseases of the circulatory system     History of " hypertension    Personal history of other diseases of the musculoskeletal system and connective tissue     History of gout    Personal history of other endocrine, nutritional and metabolic disease     History of obesity    Personal history of other medical treatment     History of blood transfusion    Residual hemorrhoidal skin tags     External hemorrhoid    Ulcer of anus and rectum     Rectal ulcer    Unspecified osteoarthritis, unspecified site     Osteoarthritis     Surgical History  Past Surgical History:   Procedure Laterality Date    CT ABDOMEN PELVIS ANGIOGRAM W AND/OR WO IV CONTRAST  5/24/2023    CT ABDOMEN PELVIS ANGIOGRAM W AND/OR WO IV CONTRAST 5/24/2023 [facility] CT    IR VENOGRAM DIALYSIS  5/26/2023    IR VENOGRAM DIALYSIS 5/26/2023 [facility] ANGIO    OTHER SURGICAL HISTORY  01/18/2022    Dialysis tunneled catheter placement    OTHER SURGICAL HISTORY  01/19/2022    Arteriovenous fistula creation procedure     Social History  Smokes cigarettes and drinks EtOH daily. Denies illicit substance use.     Family Hx:   Denies hx of seizure    Allergies  Patient has no known allergies.  No medications prior to admission.       Review of Systems  Neurological Exam  Physical Exam  General: Pleasant, resting in bed in NAD  Cardio: Regular rate  Pulm: Breathing comfortably on RA  Neuro:   Alert, oriented to self, exact date, and hospital. Bradyphrenic but following complex commands. No dysarthria or aphasia.  Pupils equal and reactive 5-3mm. EOMI. No facial droop. Hearing intact to interview.   Motor strength 4/5 throughout and limited by effort vs generalized fatigue. Normal bulk and tone.  Sensation intact to light touch throughout.   No dysmetria on FNF.    Reflexes 2+ in bilateral biceps and patella.  No asterixis.  Psych: Flat      Last Recorded Vitals  Blood pressure (!) 141/108, pulse 93, temperature 36.5 °C (97.7 °F), resp. rate 15, SpO2 96 %.    Relevant Results  Scheduled medications  levETIRAcetam, 500 mg,  intravenous, q12h  magnesium sulfate, 4 g, intravenous, Once  metoprolol, 5 mg, intravenous, Once  metoprolol, 5 mg, intravenous, Once  metoprolol, 5 mg, intravenous, Once  metoprolol, 5 mg, intravenous, q6h  oxygen, , inhalation, Continuous - 02/gases  piperacillin-tazobactam, 4.5 g, intravenous, Once  potassium chloride CR, 40 mEq, oral, q1h  sodium bicarbonate, 100 mEq, intravenous, Once  vancomycin, 1,500 mg, intravenous, Once      Continuous medications  amiodarone, 0.5-1 mg/min, Last Rate: 1 mg/min (10/01/23 0651)  niCARdipine, 2.5-15 mg/hr, Last Rate: Stopped (09/30/23 1830)      PRN medications  PRN medications: hydrALAZINE           Results for orders placed or performed during the hospital encounter of 09/30/23 (from the past 24 hour(s))   Renal Function Panel   Result Value Ref Range    Glucose 133 (H) 74 - 99 mg/dL    Sodium 147 (H) 136 - 145 mmol/L    Potassium 3.4 (L) 3.5 - 5.3 mmol/L    Chloride 103 98 - 107 mmol/L    Bicarbonate 6 (LL) 21 - 32 mmol/L    Anion Gap 41 (H) 10 - 20 mmol/L    Urea Nitrogen 166 (HH) 6 - 23 mg/dL    Creatinine >25.00 (H) 0.50 - 1.30 mg/dL    eGFR      Calcium 4.9 (LL) 8.6 - 10.3 mg/dL    Phosphorus 15.1 (H) 2.5 - 4.9 mg/dL    Albumin 3.8 3.4 - 5.0 g/dL   Blood Gas Arterial Full Panel Unsolicited   Result Value Ref Range    POCT pH, Arterial 7.44 (H) 7.38 - 7.42 pH    POCT pCO2, Arterial 22 (L) 38 - 42 mm Hg    POCT pO2, Arterial 306 (H) 85 - 95 mm Hg    POCT SO2, Arterial 100 94 - 100 %    POCT Oxy Hemoglobin, Arterial 98.0 94.0 - 98.0 %    POCT Hematocrit Calculated, Arterial 34.0 (L) 41.0 - 52.0 %    POCT Sodium, Arterial 140 136 - 145 mmol/L    POCT Potassium, Arterial 2.4 (LL) 3.5 - 5.3 mmol/L    POCT Chloride, Arterial 105 98 - 107 mmol/L    POCT Ionized Calcium, Arterial 0.84 (L) 1.10 - 1.33 mmol/L    POCT Glucose, Arterial 129 (H) 74 - 99 mg/dL    POCT Lactate, Arterial 1.6 0.4 - 2.0 mmol/L    POCT Base Excess, Arterial -7.5 (L) -2.0 - 3.0 mmol/L    POCT HCO3  Calculated, Arterial 14.9 (L) 22.0 - 26.0 mmol/L    POCT Hemoglobin, Arterial 11.4 (L) 13.5 - 17.5 g/dL    POCT Anion Gap, Arterial 23 10 - 25 mmo/L    Patient Temperature 37.0 degrees Celsius    FiO2 100 %    Apparatus NON REBREATHER    Renal Function Panel   Result Value Ref Range    Glucose 124 (H) 74 - 99 mg/dL    Sodium 144 136 - 145 mmol/L    Potassium 2.8 (LL) 3.5 - 5.3 mmol/L    Chloride 99 98 - 107 mmol/L    Bicarbonate 14 (L) 21 - 32 mmol/L    Anion Gap 34 (H) 10 - 20 mmol/L    Urea Nitrogen 105 (HH) 6 - 23 mg/dL    Creatinine 18.52 (H) 0.50 - 1.30 mg/dL    eGFR 3 (L) >60 mL/min/1.73m*2    Calcium 6.5 (L) 8.6 - 10.3 mg/dL    Phosphorus 7.8 (H) 2.5 - 4.9 mg/dL    Albumin 4.4 3.4 - 5.0 g/dL   Blood Culture    Specimen: Peripheral Venipuncture; Blood culture   Result Value Ref Range    Blood Culture Loaded on Instrument - Culture in progress    Blood Culture    Specimen: Peripheral Venipuncture; Blood culture   Result Value Ref Range    Blood Culture Loaded on Instrument - Culture in progress    Blood Gas Arterial Full Panel Unsolicited   Result Value Ref Range    POCT pH, Arterial 7.47 (H) 7.38 - 7.42 pH    POCT pCO2, Arterial 21 (L) 38 - 42 mm Hg    POCT pO2, Arterial 211 (H) 85 - 95 mm Hg    POCT SO2, Arterial 100 94 - 100 %    POCT Oxy Hemoglobin, Arterial 97.8 94.0 - 98.0 %    POCT Hematocrit Calculated, Arterial 16.0 (L) 41.0 - 52.0 %    POCT Sodium, Arterial 141 136 - 145 mmol/L    POCT Potassium, Arterial 2.6 (LL) 3.5 - 5.3 mmol/L    POCT Chloride, Arterial 104 98 - 107 mmol/L    POCT Ionized Calcium, Arterial 0.91 (L) 1.10 - 1.33 mmol/L    POCT Glucose, Arterial 109 (H) 74 - 99 mg/dL    POCT Lactate, Arterial 2.0 0.4 - 2.0 mmol/L    POCT Base Excess, Arterial -7.8 (L) -2.0 - 3.0 mmol/L    POCT HCO3 Calculated, Arterial 15.3 (L) 22.0 - 26.0 mmol/L    POCT Hemoglobin, Arterial 5.3 (LL) 13.5 - 17.5 g/dL    POCT Anion Gap, Arterial 24 10 - 25 mmo/L    Patient Temperature 37.0 degrees Celsius    FiO2 100  %    Apparatus NON REBREATHER    CBC   Result Value Ref Range    WBC 9.5 4.4 - 11.3 x10*3/uL    nRBC 1.5 (H) 0.0 - 0.0 /100 WBCs    RBC 3.14 (L) 4.50 - 5.90 x10*6/uL    Hemoglobin 7.5 (L) 13.5 - 17.5 g/dL    Hematocrit 23.0 (L) 41.0 - 52.0 %    MCV 73 (L) 80 - 100 fL    MCH 23.9 (L) 26.0 - 34.0 pg    MCHC 32.6 32.0 - 36.0 g/dL    RDW 17.5 (H) 11.5 - 14.5 %    Platelets 220 150 - 450 x10*3/uL    MPV 9.7 7.5 - 11.5 fL   Renal function panel   Result Value Ref Range    Glucose 127 (H) 74 - 99 mg/dL    Sodium 140 136 - 145 mmol/L    Potassium 3.0 (L) 3.5 - 5.3 mmol/L    Chloride 98 98 - 107 mmol/L    Bicarbonate 20 (L) 21 - 32 mmol/L    Anion Gap 25 (H) 10 - 20 mmol/L    Urea Nitrogen 79 (H) 6 - 23 mg/dL    Creatinine 15.72 (H) 0.50 - 1.30 mg/dL    eGFR 3 (L) >60 mL/min/1.73m*2    Calcium 6.4 (L) 8.6 - 10.3 mg/dL    Phosphorus 6.5 (H) 2.5 - 4.9 mg/dL    Albumin 3.6 3.4 - 5.0 g/dL   Magnesium   Result Value Ref Range    Magnesium 1.90 1.60 - 2.40 mg/dL   CBC   Result Value Ref Range    WBC 10.1 4.4 - 11.3 x10*3/uL    nRBC 1.2 (H) 0.0 - 0.0 /100 WBCs    RBC 2.81 (L) 4.50 - 5.90 x10*6/uL    Hemoglobin 6.7 (L) 13.5 - 17.5 g/dL    Hematocrit 20.7 (L) 41.0 - 52.0 %    MCV 74 (L) 80 - 100 fL    MCH 23.8 (L) 26.0 - 34.0 pg    MCHC 32.4 32.0 - 36.0 g/dL    RDW 17.6 (H) 11.5 - 14.5 %    Platelets 223 150 - 450 x10*3/uL    MPV 9.9 7.5 - 11.5 fL   Magnesium   Result Value Ref Range    Magnesium 2.20 1.60 - 2.40 mg/dL   Renal function panel   Result Value Ref Range    Glucose 129 (H) 74 - 99 mg/dL    Sodium 141 136 - 145 mmol/L    Potassium 2.7 (LL) 3.5 - 5.3 mmol/L    Chloride 98 98 - 107 mmol/L    Bicarbonate 22 21 - 32 mmol/L    Anion Gap 24 (H) 10 - 20 mmol/L    Urea Nitrogen 78 (H) 6 - 23 mg/dL    Creatinine 16.38 (H) 0.50 - 1.30 mg/dL    eGFR 3 (L) >60 mL/min/1.73m*2    Calcium 6.0 (L) 8.6 - 10.3 mg/dL    Phosphorus 6.2 (H) 2.5 - 4.9 mg/dL    Albumin 3.4 3.4 - 5.0 g/dL                 I have personally reviewed the following  imaging results    CT head wo IV contrast    Result Date: 9/30/2023  Interpreted By:  Best Chatman, STUDY: CT HEAD WO IV CONTRAST;  9/30/2023 3:37 pm   INDICATION: Signs/Symptoms:altered mental status.   COMPARISON: 06/14/2011 unenhanced head CT.   ACCESSION NUMBER(S): [id number]   ORDERING CLINICIAN: RITA MASCORRO   TECHNIQUE: Noncontrast axial CT scan of head was performed. Angled reformats in brain and bone windows and coronal and sagittal reconstructions were generated. The images were reviewed in bone, brain, blood and soft tissue windows.   FINDINGS: CSF Spaces: The ventricles, sulci and basal cisterns are within normal limits. There is no extraaxial fluid collection.   Parenchyma: Normal, but mild motion limits evaluation for subtle findings. Mild bilateral carotid siphon and distal left vertebral artery calcification is noted. The left vertebral artery is dominant. The grey-white differentiation is intact. There is no mass effect or midline shift.  There is no intracranial hemorrhage.   Calvarium: The calvarium is unremarkable. Dolichocephalic head shape. Unerupted 3rd molars.   Paranasal sinuses and mastoids: The mastoid air cells, tympanic cavities and sinuses are well developed and clear..       No evidence of acute cortical infarct or intracranial hemorrhage.   Essentially normal study.   MACRO: None   Signed by: Best Chatman 9/30/2023 4:30 PM Dictation workstation:   [id number]     Assessment/Plan     Impression:   Seizure in setting of one week of missed hemodialysis and resultant uremia. Unclear if initial right hand movement was from myoclonus or clonic activity. Likely his seizure was provoked in the setting of uremia. However, it is described that his seizure started with focal right arm and face clonic activity which would be atypical for uremeia (usually generalized) and merits further workup.     Recommendations:   - rEEG   - MRI Brain with and w/o contrast (coordinate with  renal for HD)  - continue Keppra for now; renally dose with pharmacy given ESRD; will consider discontinuing if EEG and MRI unremarkable   - nephrology following for HD  - seizure precautions should be discussed with patient when back to his full neurologic baseline  - start Thiamine 500mg TID x3 days followed by 100mg daily  - recommendations relayed to Dr. Kaminski  - will sign patient out to incoming Neurologist Dr. Kye who will resume care tomorrow       I spent 60 minutes in the professional and overall care of this patient.      Renato Torres, DO

## 2023-10-01 NOTE — H&P
History Of Present Illness  Soren Howard is a 48 y.o. male presenting from the ED  see my note that I had previously prepared during the EPIC initial downtime printed out and presented to be scanned into the EMR.   Past Medical History  He has a past medical history of Angiodysplasia of stomach and duodenum without bleeding, COVID-19, Diverticulosis of intestine, part unspecified, without perforation or abscess without bleeding, End stage renal disease (CMS/[facility]) (08/02/2021), Other hemorrhoids, Personal history of diseases of the blood and blood-forming organs and certain disorders involving the immune mechanism, Personal history of other diseases of the circulatory system, Personal history of other diseases of the musculoskeletal system and connective tissue, Personal history of other endocrine, nutritional and metabolic disease, Personal history of other medical treatment, Residual hemorrhoidal skin tags, Ulcer of anus and rectum, and Unspecified osteoarthritis, unspecified site.    Surgical History  He has a past surgical history that includes Other surgical history (01/18/2022); Other surgical history (01/19/2022); CT angio abdomen pelvis w and or wo IV IV contrast (5/24/2023); and IR venogram dialysis (5/26/2023).     Social History  He has no history on file for tobacco use, alcohol use, and drug use.    Family History  No family history on file.     Allergies  Patient has no known allergies.    Review of Systems     Physical Exam     Last Recorded Vitals  Blood pressure (!) 141/108, pulse (!) 127, temperature 36.4 °C (97.5 °F), temperature source Temporal, resp. rate 15, SpO2 99 %.    Relevant ResultsAssessment/Plan   Active Problems:    ESRD (end stage renal disease) on dialysis (CMS/HCC)    Seizure (CMS/[facility])         I spent 15 minutes in the professional and overall care of this patient.      Crispin Muñiz MD

## 2023-10-02 ENCOUNTER — APPOINTMENT (OUTPATIENT)
Dept: RADIOLOGY | Facility: HOSPITAL | Age: 48
End: 2023-10-02
Payer: COMMERCIAL

## 2023-10-02 LAB
ALBUMIN SERPL BCP-MCNC: 3.2 G/DL (ref 3.4–5)
ANION GAP SERPL CALC-SCNC: 22 MMOL/L (ref 10–20)
BUN SERPL-MCNC: 91 MG/DL (ref 6–23)
C DIF TOX TCDA+TCDB STL QL NAA+PROBE: NOT DETECTED
CALCIUM SERPL-MCNC: 5.7 MG/DL (ref 8.6–10.3)
CHLORIDE SERPL-SCNC: 99 MMOL/L (ref 98–107)
CO2 SERPL-SCNC: 23 MMOL/L (ref 21–32)
CREAT SERPL-MCNC: 18.05 MG/DL (ref 0.5–1.3)
ERYTHROCYTE [DISTWIDTH] IN BLOOD BY AUTOMATED COUNT: 18.1 % (ref 11.5–14.5)
GFR SERPL CREATININE-BSD FRML MDRD: 3 ML/MIN/1.73M*2
GLUCOSE SERPL-MCNC: 96 MG/DL (ref 74–99)
HCT VFR BLD AUTO: 23.5 % (ref 41–52)
HGB BLD-MCNC: 7.4 G/DL (ref 13.5–17.5)
MAGNESIUM SERPL-MCNC: 3.2 MG/DL (ref 1.6–2.4)
MCH RBC QN AUTO: 23.9 PG (ref 26–34)
MCHC RBC AUTO-ENTMCNC: 31.5 G/DL (ref 32–36)
MCV RBC AUTO: 76 FL (ref 80–100)
NRBC BLD-RTO: 0.3 /100 WBCS (ref 0–0)
PHOSPHATE SERPL-MCNC: 7.7 MG/DL (ref 2.5–4.9)
PLATELET # BLD AUTO: 217 X10*3/UL (ref 150–450)
PMV BLD AUTO: 9.2 FL (ref 7.5–11.5)
POTASSIUM SERPL-SCNC: 3.1 MMOL/L (ref 3.5–5.3)
RBC # BLD AUTO: 3.09 X10*6/UL (ref 4.5–5.9)
SODIUM SERPL-SCNC: 141 MMOL/L (ref 136–145)
WBC # BLD AUTO: 9.9 X10*3/UL (ref 4.4–11.3)

## 2023-10-02 PROCEDURE — 99291 CRITICAL CARE FIRST HOUR: CPT | Performed by: STUDENT IN AN ORGANIZED HEALTH CARE EDUCATION/TRAINING PROGRAM

## 2023-10-02 PROCEDURE — 8010000001 HC DIALYSIS - HEMODIALYSIS PER DAY

## 2023-10-02 PROCEDURE — 83735 ASSAY OF MAGNESIUM: CPT | Performed by: NURSE PRACTITIONER

## 2023-10-02 PROCEDURE — 99291 CRITICAL CARE FIRST HOUR: CPT | Performed by: NURSE PRACTITIONER

## 2023-10-02 PROCEDURE — 2500000004 HC RX 250 GENERAL PHARMACY W/ HCPCS (ALT 636 FOR OP/ED): Performed by: ANESTHESIOLOGY

## 2023-10-02 PROCEDURE — 2550000001 HC RX 255 CONTRASTS: Performed by: SURGERY

## 2023-10-02 PROCEDURE — 85027 COMPLETE CBC AUTOMATED: CPT | Performed by: NURSE PRACTITIONER

## 2023-10-02 PROCEDURE — 6350000001 HC RX 635 EPOETIN >10,000 UNITS: Mod: JZ | Performed by: INTERNAL MEDICINE

## 2023-10-02 PROCEDURE — A9575 INJ GADOTERATE MEGLUMI 0.1ML: HCPCS | Performed by: SURGERY

## 2023-10-02 PROCEDURE — 70553 MRI BRAIN STEM W/O & W/DYE: CPT | Performed by: RADIOLOGY

## 2023-10-02 PROCEDURE — 80069 RENAL FUNCTION PANEL: CPT | Performed by: REGISTERED NURSE

## 2023-10-02 PROCEDURE — 2500000002 HC RX 250 W HCPCS SELF ADMINISTERED DRUGS (ALT 637 FOR MEDICARE OP, ALT 636 FOR OP/ED): Performed by: NURSE PRACTITIONER

## 2023-10-02 PROCEDURE — 2500000004 HC RX 250 GENERAL PHARMACY W/ HCPCS (ALT 636 FOR OP/ED): Performed by: NURSE PRACTITIONER

## 2023-10-02 PROCEDURE — 36415 COLL VENOUS BLD VENIPUNCTURE: CPT | Performed by: NURSE PRACTITIONER

## 2023-10-02 PROCEDURE — 2500000004 HC RX 250 GENERAL PHARMACY W/ HCPCS (ALT 636 FOR OP/ED)

## 2023-10-02 PROCEDURE — 5A1D70Z PERFORMANCE OF URINARY FILTRATION, INTERMITTENT, LESS THAN 6 HOURS PER DAY: ICD-10-PCS | Performed by: INTERNAL MEDICINE

## 2023-10-02 PROCEDURE — 70553 MRI BRAIN STEM W/O & W/DYE: CPT

## 2023-10-02 PROCEDURE — 2020000001 HC ICU ROOM DAILY

## 2023-10-02 PROCEDURE — 2500000001 HC RX 250 WO HCPCS SELF ADMINISTERED DRUGS (ALT 637 FOR MEDICARE OP): Performed by: NURSE PRACTITIONER

## 2023-10-02 RX ORDER — POTASSIUM CHLORIDE 14.9 MG/ML
INJECTION INTRAVENOUS
Status: COMPLETED
Start: 2023-10-02 | End: 2023-10-02

## 2023-10-02 RX ORDER — CALCIUM GLUCONATE 20 MG/ML
1 INJECTION, SOLUTION INTRAVENOUS EVERY 4 HOURS
Status: DISPENSED | OUTPATIENT
Start: 2023-10-02 | End: 2023-10-02

## 2023-10-02 RX ORDER — CALCIUM ACETATE 667 MG/1
667 CAPSULE ORAL
Status: DISCONTINUED | OUTPATIENT
Start: 2023-10-02 | End: 2023-10-04 | Stop reason: HOSPADM

## 2023-10-02 RX ORDER — FAMOTIDINE 20 MG/1
20 TABLET, FILM COATED ORAL
Status: DISCONTINUED | OUTPATIENT
Start: 2023-10-02 | End: 2023-10-04 | Stop reason: HOSPADM

## 2023-10-02 RX ORDER — POTASSIUM CHLORIDE 14.9 MG/ML
20 INJECTION INTRAVENOUS
Status: COMPLETED | OUTPATIENT
Start: 2023-10-02 | End: 2023-10-02

## 2023-10-02 RX ORDER — GADOTERATE MEGLUMINE 376.9 MG/ML
20 INJECTION INTRAVENOUS
Status: COMPLETED | OUTPATIENT
Start: 2023-10-02 | End: 2023-10-02

## 2023-10-02 RX ADMIN — AMIODARONE HYDROCHLORIDE 400 MG: 200 TABLET ORAL at 21:43

## 2023-10-02 RX ADMIN — CALCIUM GLUCONATE 1 G: 20 INJECTION, SOLUTION INTRAVENOUS at 12:42

## 2023-10-02 RX ADMIN — LEVETIRACETAM 500 MG: 5 INJECTION INTRAVENOUS at 00:14

## 2023-10-02 RX ADMIN — POTASSIUM CHLORIDE 20 MEQ: 14.9 INJECTION, SOLUTION INTRAVENOUS at 14:48

## 2023-10-02 RX ADMIN — CARVEDILOL 25 MG: 25 TABLET, FILM COATED ORAL at 21:44

## 2023-10-02 RX ADMIN — FAMOTIDINE 20 MG: 20 TABLET, FILM COATED ORAL at 21:44

## 2023-10-02 RX ADMIN — LEVETIRACETAM 500 MG: 5 INJECTION INTRAVENOUS at 14:46

## 2023-10-02 RX ADMIN — AMIODARONE HYDROCHLORIDE 400 MG: 200 TABLET ORAL at 10:48

## 2023-10-02 RX ADMIN — HYDRALAZINE HYDROCHLORIDE 50 MG: 50 TABLET ORAL at 21:43

## 2023-10-02 RX ADMIN — GADOTERATE MEGLUMINE 20 ML: 376.9 INJECTION INTRAVENOUS at 20:50

## 2023-10-02 RX ADMIN — HYDRALAZINE HYDROCHLORIDE 10 MG: 20 INJECTION INTRAMUSCULAR; INTRAVENOUS at 06:17

## 2023-10-02 RX ADMIN — EPOETIN ALFA-EPBX 10000 UNITS: 10000 INJECTION, SOLUTION INTRAVENOUS; SUBCUTANEOUS at 09:45

## 2023-10-02 RX ADMIN — POTASSIUM CHLORIDE 20 MEQ: 14.9 INJECTION, SOLUTION INTRAVENOUS at 07:30

## 2023-10-02 ASSESSMENT — PAIN SCALES - GENERAL
PAINLEVEL_OUTOF10: 0 - NO PAIN

## 2023-10-02 ASSESSMENT — PAIN - FUNCTIONAL ASSESSMENT
PAIN_FUNCTIONAL_ASSESSMENT: 0-10

## 2023-10-02 NOTE — PROGRESS NOTES
"Nephrology Consult Progress Note    Admit Date: 9/30/2023    Assessment and Plan:  Pt with ESKD, HTN, came with altered MS, weakness, missing HD, seizure  - ESKD on HD: euvolemic  Seen on HD now, having some issues with  AVF  cannulation  Planning to have MRI with cynthai, if using group 2-3 of GBCM no need to adjust dialysis schedule   - HTN: controlled  - Hypokalemia  - BMD: hypoCa and hyperP on sevelamer   - Hb 7.4    PLAN:  - HD now  - adding epogen  - changing to P binder to calcium acetate     Interval history:  No complaints     CURRENT MEDICATIONS:  reviewed      Intake/Output Summary (Last 24 hours) at 10/2/2023 0915  Last data filed at 10/2/2023 0400  Gross per 24 hour   Intake 1316.05 ml   Output 300 ml   Net 1016.05 ml       PHYSICAL EXAM:  /72   Pulse 105   Temp 36.7 °C (98 °F) (Temporal)   Resp 10   Ht 1.707 m (5' 7.21\")   Wt 95.8 kg (211 lb 3.2 oz)   SpO2 99%   BMI 32.88 kg/m²     Intake/Output Summary (Last 24 hours) at 10/2/2023 0915  Last data filed at 10/2/2023 0400  Gross per 24 hour   Intake 1316.05 ml   Output 300 ml   Net 1016.05 ml     Gen: AAO, NAD  Neck: No JVD  Cardiac: RRR  Resp: clear   Ext: no edema   Access: LUE AVF  Neuro: moves 4 ext  Peripheral Pulses: Capillary refill <2secs, strong peripheral pulses.  Skin: Skin color, texture, turgor normal, no suspicious rashes or lesions.    Labs:  Results for orders placed or performed during the hospital encounter of 09/30/23 (from the past 24 hour(s))   Potassium - 4 hours after oral dose   Result Value Ref Range    Potassium 3.2 (L) 3.5 - 5.3 mmol/L   Magnesium   Result Value Ref Range    Magnesium 3.40 (H) 1.60 - 2.40 mg/dL   Renal function panel   Result Value Ref Range    Glucose 96 74 - 99 mg/dL    Sodium 141 136 - 145 mmol/L    Potassium 3.1 (L) 3.5 - 5.3 mmol/L    Chloride 99 98 - 107 mmol/L    Bicarbonate 23 21 - 32 mmol/L    Anion Gap 22 (H) 10 - 20 mmol/L    Urea Nitrogen 91 (HH) 6 - 23 mg/dL    Creatinine 18.05 (H) 0.50 - " 1.30 mg/dL    eGFR 3 (L) >60 mL/min/1.73m*2    Calcium 5.7 (L) 8.6 - 10.3 mg/dL    Phosphorus 7.7 (H) 2.5 - 4.9 mg/dL    Albumin 3.2 (L) 3.4 - 5.0 g/dL   CBC   Result Value Ref Range    WBC 9.9 4.4 - 11.3 x10*3/uL    nRBC 0.3 (H) 0.0 - 0.0 /100 WBCs    RBC 3.09 (L) 4.50 - 5.90 x10*6/uL    Hemoglobin 7.4 (L) 13.5 - 17.5 g/dL    Hematocrit 23.5 (L) 41.0 - 52.0 %    MCV 76 (L) 80 - 100 fL    MCH 23.9 (L) 26.0 - 34.0 pg    MCHC 31.5 (L) 32.0 - 36.0 g/dL    RDW 18.1 (H) 11.5 - 14.5 %    Platelets 217 150 - 450 x10*3/uL    MPV 9.2 7.5 - 11.5 fL   Magnesium   Result Value Ref Range    Magnesium 3.20 (H) 1.60 - 2.40 mg/dL         DATA:   Diagnostic tests reviewed for today's visit:    Labs and imaging      Will continue to follow.   Time spent on consult progress: 35 min    Signature: Abel Wiseman MD

## 2023-10-02 NOTE — PROGRESS NOTES
"Soren Howard is a 48 y.o. male on day 2 of admission presenting with Hematuria and new onset seizure.      Subjective   He remained stable in house.  No new seizures.  Was started on Keppra at renal dose was getting dialysis.  Plan is to get the MRI brain with and without contrast to be coordinated with dialysis.       Objective     Last Recorded Vitals  Blood pressure 139/80, pulse 72, temperature 36.4 °C (97.5 °F), temperature source Tympanic, resp. rate 13, height 1.707 m (5' 7.21\"), weight 95.8 kg (211 lb 3.2 oz), SpO2 100 %.      Neurological Exam  Alert and oriented x2, slow in responding but responds appropriately.  Face is symmetric facial sensations intact.  No abnormal involuntary movements of the eyes or face.    Moves all 4 extremities purposefully.  No abnormal involuntary limb movements.  Sensations to touch tactile temperature intact on 4 4 extremities proximally and distally.  Reflexes are symmetric.      Relevant Results  Current medications reviewed in medical records:  Current labs reviewed in medical records:  Imaging pending.          Assessment/Plan   The patient is a 48-year-old man with a history of end-stage renal disease presenting for the episode concerning for generalized tonic-clonic seizures while he was having dialysis.  This was the first ever event and it was convincing for seizure.  Subsequently was started on Keppra at the renal dose.  The plan is to get MRI brain with and without contrast to be coordinated with dialysis section.  We will also follow through EEG.  There was a concern of focality with some lateralization on history therefore we will continue his antiepileptic and have him follow-up outpatient in neurology clinic.  30 minutes spent in care of this patient in ICU.    Aasef G Shaikh, MD PhD  "

## 2023-10-02 NOTE — PROGRESS NOTES
"Soren Howard is a 48 y.o. male on day 2 of admission presenting with Hematuria.    Subjective   DAVID reported       Objective     Physical Exam  Constitutional:       General: He is not in acute distress.  Cardiovascular:      Rate and Rhythm: Normal rate and regular rhythm.      Pulses: Normal pulses.   Pulmonary:      Effort: Pulmonary effort is normal. No respiratory distress.   Abdominal:      General: There is no distension.      Palpations: Abdomen is soft.      Tenderness: There is no abdominal tenderness.   Musculoskeletal:         General: Normal range of motion.      Cervical back: Normal range of motion.   Skin:     General: Skin is warm and dry.      Capillary Refill: Capillary refill takes less than 2 seconds.   Neurological:      General: No focal deficit present.      Mental Status: He is alert and oriented to person, place, and time. Mental status is at baseline.   Psychiatric:         Mood and Affect: Mood normal.         Last Recorded Vitals  Blood pressure 144/72, pulse 70, temperature 36.7 °C (98 °F), temperature source Temporal, resp. rate 10, height 1.707 m (5' 7.21\"), weight 95.8 kg (211 lb 3.2 oz), SpO2 99 %.  Intake/Output last 3 Shifts:  I/O last 3 completed shifts:  In: 2868.1 (29.9 mL/kg) [I.V.:2268.1 (23.7 mL/kg); IV Piggyback:600]  Out: 300 (3.1 mL/kg) [Urine:300 (0.1 mL/kg/hr)]  Weight: 95.8 kg     Relevant Results           This patient currently has cardiac telemetry ordered; if you would like to modify or discontinue the telemetry order, click here to go to the orders activity to modify/discontinue the order.    Scheduled medications  amiodarone, 400 mg, oral, BID  amLODIPine, 10 mg, oral, Daily  atorvastatin, 80 mg, oral, Daily  calcium gluconate, 1 g, intravenous, q4h  carvedilol, 25 mg, oral, BID  famotidine, 20 mg, oral, Daily   Or  famotidine, 20 mg, intravenous, Daily  hydrALAZINE, 50 mg, oral, BID  levETIRAcetam, 500 mg, intravenous, q12h  piperacillin-tazobactam, 4.5 g, " intravenous, Once  potassium chloride, 20 mEq, intravenous, q2h  sevelamer carbonate, 800 mg, oral, TID with meals  sodium bicarbonate, 100 mEq, intravenous, Once  vancomycin, 1,500 mg, intravenous, Once      Continuous medications     PRN medications  PRN medications: hydrALAZINE    Results for orders placed or performed during the hospital encounter of 09/30/23 (from the past 24 hour(s))   Potassium - 4 hours after oral dose   Result Value Ref Range    Potassium 3.2 (L) 3.5 - 5.3 mmol/L   Magnesium   Result Value Ref Range    Magnesium 3.40 (H) 1.60 - 2.40 mg/dL   Renal function panel   Result Value Ref Range    Glucose 96 74 - 99 mg/dL    Sodium 141 136 - 145 mmol/L    Potassium 3.1 (L) 3.5 - 5.3 mmol/L    Chloride 99 98 - 107 mmol/L    Bicarbonate 23 21 - 32 mmol/L    Anion Gap 22 (H) 10 - 20 mmol/L    Urea Nitrogen 91 (HH) 6 - 23 mg/dL    Creatinine 18.05 (H) 0.50 - 1.30 mg/dL    eGFR 3 (L) >60 mL/min/1.73m*2    Calcium 5.7 (L) 8.6 - 10.3 mg/dL    Phosphorus 7.7 (H) 2.5 - 4.9 mg/dL    Albumin 3.2 (L) 3.4 - 5.0 g/dL   CBC   Result Value Ref Range    WBC 9.9 4.4 - 11.3 x10*3/uL    nRBC 0.3 (H) 0.0 - 0.0 /100 WBCs    RBC 3.09 (L) 4.50 - 5.90 x10*6/uL    Hemoglobin 7.4 (L) 13.5 - 17.5 g/dL    Hematocrit 23.5 (L) 41.0 - 52.0 %    MCV 76 (L) 80 - 100 fL    MCH 23.9 (L) 26.0 - 34.0 pg    MCHC 31.5 (L) 32.0 - 36.0 g/dL    RDW 18.1 (H) 11.5 - 14.5 %    Platelets 217 150 - 450 x10*3/uL    MPV 9.2 7.5 - 11.5 fL   Magnesium   Result Value Ref Range    Magnesium 3.20 (H) 1.60 - 2.40 mg/dL     XR chest 1 view    Result Date: 10/1/2023  Interpreted By:  Richi Pat, STUDY: XR CHEST 1 VIEW;  9/30/2023 11:44 am   INDICATION: Signs/Symptoms:SOB.   COMPARISON: 07/17/2023   ACCESSION NUMBER(S): [id number]   ORDERING CLINICIAN: CELESTINO MALIK   FINDINGS: AP portable view of the chest is obtained.  Limited exam due to portable nature. Magnified cardiac silhouette. No infiltrates, effusions or pneumothorax.       1.  No evidence  of acute cardiopulmonary process.       MACRO: None   Signed by: Richi Pat 10/1/2023 7:09 AM Dictation workstation:   [id number]    CT head wo IV contrast    Result Date: 9/30/2023  Interpreted By:  Best Chatman, STUDY: CT HEAD WO IV CONTRAST;  9/30/2023 3:37 pm   INDICATION: Signs/Symptoms:altered mental status.   COMPARISON: 06/14/2011 unenhanced head CT.   ACCESSION NUMBER(S): [id number]   ORDERING CLINICIAN: RITA MASCORRO   TECHNIQUE: Noncontrast axial CT scan of head was performed. Angled reformats in brain and bone windows and coronal and sagittal reconstructions were generated. The images were reviewed in bone, brain, blood and soft tissue windows.   FINDINGS: CSF Spaces: The ventricles, sulci and basal cisterns are within normal limits. There is no extraaxial fluid collection.   Parenchyma: Normal, but mild motion limits evaluation for subtle findings. Mild bilateral carotid siphon and distal left vertebral artery calcification is noted. The left vertebral artery is dominant. The grey-white differentiation is intact. There is no mass effect or midline shift.  There is no intracranial hemorrhage.   Calvarium: The calvarium is unremarkable. Dolichocephalic head shape. Unerupted 3rd molars.   Paranasal sinuses and mastoids: The mastoid air cells, tympanic cavities and sinuses are well developed and clear..       No evidence of acute cortical infarct or intracranial hemorrhage.   Essentially normal study.   MACRO: None   Signed by: Best Chatman 9/30/2023 4:30 PM Dictation workstation:   [id number]               Assessment/Plan   Active Problems:    ESRD (end stage renal disease) on dialysis (CMS/[facility])    Seizure (CMS/[facility])    This is a 49yo male with history of HTN and ESRD on IHD admitted to [facility] ICU for altered mental status and generalized weakness following missing approximately a week of dialysis as well as medication non compliance with severe metabolic acidemia and metabolic derangements  requiring urgent dialysis.       Plan:      Neuro: Acute metabolic encephalopathy 2/2 severe metabolic derangements due to missing several dialysis treatments, appears improved.  Seizures likely 2/2 above, treated with Ativan 2mg and Propofol 20mg with resolution, has not had any reported seizures since.   CT scan of head negative for any acute pathology  - Consult neurology, appreciate recs  - EEG today per neurology   - MRI with contrast today  - Continue Keppra  - Neuro checks per unit protocol     CV: HTN.  Severe HTN 9/30 requiring Nicardipine infusion.  Afib RVR, refractory to metoprolol 5mg IV x 4 with Amiodarone infusion and boluses.  Has remained in SR  - Continue home Amlodipine, Carvedilol, and hydralazine  - Amiodarone 400mg BID  - NIBP and telemonitoring  - Home Statin     Pulm:   - Sat >90%  - Bronch hygiene     GI:   - Regular diet  - Pepcid     Renal: ESRD on IHD, missed several dialysis sessions, required urgent dialysis 9/30. Hypokalemia  - daily rfp and replete electrolytes as needed  - Nephrology consulted re: IHD  - Renvela  - I&Os and weights     Heme:   - Daily CBC  - Heparin and SCDs for DVTppx     ID: No leukocytosis, afebrile.  Multiple loose stools  Covid negative  Blood Cultures pending  CDIFF PCR pending  - Trend temps  - Trend WBCs  - Follow cultures          I spent 60 minutes in the professional and overall care of this patient.      Aidan Burgos, APRN-CNP

## 2023-10-03 ENCOUNTER — APPOINTMENT (OUTPATIENT)
Dept: NEUROLOGY | Facility: HOSPITAL | Age: 48
End: 2023-10-03
Payer: COMMERCIAL

## 2023-10-03 LAB
ALBUMIN SERPL BCP-MCNC: 3.3 G/DL (ref 3.4–5)
ANION GAP SERPL CALC-SCNC: 20 MMOL/L (ref 10–20)
BUN SERPL-MCNC: 76 MG/DL (ref 6–23)
CALCIUM SERPL-MCNC: 5.8 MG/DL (ref 8.6–10.3)
CHLORIDE SERPL-SCNC: 98 MMOL/L (ref 98–107)
CO2 SERPL-SCNC: 21 MMOL/L (ref 21–32)
CREAT SERPL-MCNC: 16.64 MG/DL (ref 0.5–1.3)
ERYTHROCYTE [DISTWIDTH] IN BLOOD BY AUTOMATED COUNT: 18.3 % (ref 11.5–14.5)
GFR SERPL CREATININE-BSD FRML MDRD: 3 ML/MIN/1.73M*2
GLUCOSE SERPL-MCNC: 96 MG/DL (ref 74–99)
HCT VFR BLD AUTO: 24.5 % (ref 41–52)
HGB BLD-MCNC: 7.3 G/DL (ref 13.5–17.5)
MAGNESIUM SERPL-MCNC: 2.8 MG/DL (ref 1.6–2.4)
MCH RBC QN AUTO: 23.8 PG (ref 26–34)
MCHC RBC AUTO-ENTMCNC: 29.8 G/DL (ref 32–36)
MCV RBC AUTO: 80 FL (ref 80–100)
NRBC BLD-RTO: 0.3 /100 WBCS (ref 0–0)
PHOSPHATE SERPL-MCNC: 5.1 MG/DL (ref 2.5–4.9)
PLATELET # BLD AUTO: 220 X10*3/UL (ref 150–450)
PMV BLD AUTO: 9.8 FL (ref 7.5–11.5)
POTASSIUM SERPL-SCNC: 3.3 MMOL/L (ref 3.5–5.3)
RBC # BLD AUTO: 3.07 X10*6/UL (ref 4.5–5.9)
SODIUM SERPL-SCNC: 136 MMOL/L (ref 136–145)
WBC # BLD AUTO: 9.3 X10*3/UL (ref 4.4–11.3)

## 2023-10-03 PROCEDURE — 83735 ASSAY OF MAGNESIUM: CPT | Performed by: NURSE PRACTITIONER

## 2023-10-03 PROCEDURE — 1200000002 HC GENERAL ROOM WITH TELEMETRY DAILY

## 2023-10-03 PROCEDURE — 99291 CRITICAL CARE FIRST HOUR: CPT | Performed by: EMERGENCY MEDICINE

## 2023-10-03 PROCEDURE — 36415 COLL VENOUS BLD VENIPUNCTURE: CPT | Performed by: NURSE PRACTITIONER

## 2023-10-03 PROCEDURE — 2500000004 HC RX 250 GENERAL PHARMACY W/ HCPCS (ALT 636 FOR OP/ED): Performed by: ANESTHESIOLOGY

## 2023-10-03 PROCEDURE — 2500000001 HC RX 250 WO HCPCS SELF ADMINISTERED DRUGS (ALT 637 FOR MEDICARE OP): Performed by: NURSE PRACTITIONER

## 2023-10-03 PROCEDURE — 2500000002 HC RX 250 W HCPCS SELF ADMINISTERED DRUGS (ALT 637 FOR MEDICARE OP, ALT 636 FOR OP/ED): Performed by: NURSE PRACTITIONER

## 2023-10-03 PROCEDURE — 95819 EEG AWAKE AND ASLEEP: CPT

## 2023-10-03 PROCEDURE — 80069 RENAL FUNCTION PANEL: CPT | Performed by: REGISTERED NURSE

## 2023-10-03 PROCEDURE — 85027 COMPLETE CBC AUTOMATED: CPT | Performed by: NURSE PRACTITIONER

## 2023-10-03 PROCEDURE — 95819 EEG AWAKE AND ASLEEP: CPT | Performed by: PSYCHIATRY & NEUROLOGY

## 2023-10-03 RX ORDER — SODIUM CHLORIDE 9 MG/ML
75 INJECTION, SOLUTION INTRAVENOUS CONTINUOUS
Status: CANCELLED | OUTPATIENT
Start: 2023-10-03

## 2023-10-03 RX ADMIN — CARVEDILOL 25 MG: 25 TABLET, FILM COATED ORAL at 09:12

## 2023-10-03 RX ADMIN — LEVETIRACETAM 500 MG: 5 INJECTION INTRAVENOUS at 00:08

## 2023-10-03 RX ADMIN — AMLODIPINE BESYLATE 10 MG: 10 TABLET ORAL at 09:12

## 2023-10-03 RX ADMIN — CARVEDILOL 25 MG: 25 TABLET, FILM COATED ORAL at 20:27

## 2023-10-03 RX ADMIN — HYDRALAZINE HYDROCHLORIDE 50 MG: 50 TABLET ORAL at 20:27

## 2023-10-03 RX ADMIN — AMIODARONE HYDROCHLORIDE 400 MG: 200 TABLET ORAL at 09:11

## 2023-10-03 RX ADMIN — HYDRALAZINE HYDROCHLORIDE 50 MG: 50 TABLET ORAL at 09:12

## 2023-10-03 RX ADMIN — LEVETIRACETAM 500 MG: 5 INJECTION INTRAVENOUS at 12:14

## 2023-10-03 ASSESSMENT — PAIN SCALES - GENERAL
PAINLEVEL_OUTOF10: 0 - NO PAIN

## 2023-10-03 ASSESSMENT — PAIN - FUNCTIONAL ASSESSMENT
PAIN_FUNCTIONAL_ASSESSMENT: 0-10

## 2023-10-03 NOTE — PROGRESS NOTES
"Nephrology Consult Progress Note    Admit Date: 9/30/2023    Assessment and Plan:  Pt with ESKD, HTN, came with altered MS, weakness, missing HD, seizure  - ESKD on HD: euvolemic  Had HD yesterday   - HTN: controlled  - BMD: hypoCa and hyperP, changed to phoslo yesterday   - Hb 7.4  On epogen    PLAN:  - HD tomorrow     Interval history:  No complaints   Getting EEG    CURRENT MEDICATIONS:  reviewed      Intake/Output Summary (Last 24 hours) at 10/3/2023 1228  Last data filed at 10/3/2023 0900  Gross per 24 hour   Intake --   Output 201 ml   Net -201 ml         PHYSICAL EXAM:  /78 (Patient Position: Lying)   Pulse 68   Temp 36.6 °C (97.8 °F) (Skin)   Resp 12   Ht 1.707 m (5' 7.21\")   Wt 94 kg (207 lb 3.7 oz)   SpO2 98%   BMI 32.26 kg/m²     Intake/Output Summary (Last 24 hours) at 10/3/2023 1228  Last data filed at 10/3/2023 0900  Gross per 24 hour   Intake --   Output 201 ml   Net -201 ml       Gen: AAO, NAD  Neck: No JVD  Cardiac: RRR  Resp: clear   Ext: no edema   Access: LUE AVF  Neuro: moves 4 ext  Peripheral Pulses: Capillary refill <2secs, strong peripheral pulses.  Skin: Skin color, texture, turgor normal, no suspicious rashes or lesions.    Labs:  No results found for this or any previous visit (from the past 24 hour(s)).        DATA:   Diagnostic tests reviewed for today's visit:    Labs and imaging      Will continue to follow.   Time spent on consult progress: 35 min    Signature: Abel Wiseman MD    "

## 2023-10-03 NOTE — PROGRESS NOTES
"Soren Howard is a 48 y.o. male on day 3 of admission    Subjective   Interval Events  10/3 - No acute events reported overnight.  Patient denies acute pain or other complaints this AM.        Objective     Physical Exam  General: Pleasant male, sitting up in bed, resting comfortably, NAD   HEENT: NCAT, no obvious signs of acute trauma  Neurological: Awake and alert, oriented x 3, FS, facial sensation symmetric, PERRL, SILT BUE and BLE, WOODS x 4 with grossly symmetric strength   CV: RRR  Pulmonary: CTA B/L, no wheezes/rales/rhonchi  Abdominal: Soft, not appreciably tender or distended  Extremities: No gross bony deformities. LUE AVF with +bruit and thrill  Skin: Warm and dry    Last Recorded Vitals  Blood pressure 127/84, pulse 72, temperature 36.3 °C (97.4 °F), resp. rate 12, height 1.707 m (5' 7.21\"), weight 94 kg (207 lb 3.7 oz), SpO2 98 %.  Intake/Output last 3 Shifts:  I/O last 3 completed shifts:  In: 300 (3.2 mL/kg) [I.V.:200 (2.1 mL/kg); IV Piggyback:100]  Out: 500 (5.3 mL/kg) [Urine:500 (0.1 mL/kg/hr)]  Weight: 94 kg     Relevant Results           This patient currently has cardiac telemetry ordered; if you would like to modify or discontinue the telemetry order, click here to go to the orders activity to modify/discontinue the order.                 Assessment/Plan   Active Problems:    ESRD (end stage renal disease) on dialysis (CMS/[facility])    Seizure (CMS/[facility])    47yo male with history of HTN and ESRD on IHD admitted to [facility] ICU for altered mental status and generalized weakness on 9/30/23 following missing approximately a week of dialysis per report, as well as medication non-adherence.  Laboratory studies on admission notable for significant metabolic acidemia and metabolic derangements requiring urgent dialysis.  Prior to initiation of dialysis, he was noted to have a generalized tonic clonic seizure witnessed by RN which self aborted.  He was given 2 mg of Ativan. He has exhibit no further seizure-like " activity since that time and following dialysis.    Neuro  #Encephalopathy, likely 2/2 metabolic derangements  in setting of multiple missed dialysis treatments, which appears improved  #Seizures, likely 2/2 above, treated with 2 mg of Ativan. No recurrence of clinical seizures since that time    PLAN:  -Neurology consulted, appreciate input  -CTH 9/30 without acute process  -MRI obtained 10/2, results pending  -EEG pending  -Continue LEV, renal dosage discussed with pharmacy  -Serial neurological and pain assessments  -Minimize delirigenic agents as able    CV  #History of HTN, requiring nicardipine infusion, now improved  #AF with RVR, for which he was given metoprolol 5 mg IV x 4 and amiodarone infusion with conversion to NSR    PLAN:  -Continue home amlodipine, carvedilol and hydralazine  -Started amiodarone 400 mg BID, consider discontinuation as he has remained in NSR  -Continue home statin  -Telemetry and NIBP monitoring      Pulmonary    PLAN:  -Continue SpO2 monitoring with goal >90-92%  -Encourage bronchial hygiene     Renal  #ESRD on IHD, missed several dialysis sessions, required urgent dialysis 9/30. Hypokalemia    PLAN:  -Nephrology consulted, appreciate input  -Trend RFP/metabolic panel  -Replete electrolytes as needed  -Continue Renvela  -Trend I&Os and weights       GI    PLAN:  - Continue regular diet  - Pepcid      Heme  #Anemia, likely multifactorial in setting of ESRD/chronic disease    PLAN:  - Trend CBC  - Continue DVT ppx      ID    #No leukocytosis, afebrile    PLAN:  -C diff sent due to multiple loose stools - negative   -Covid negative  -Blood culture 9/30 - NGTD  -Trend temperature/WBC curve  - Follow cultures     Disposition:  -Likely transfer out of the ICU provided patient remains stable.         This critically ill patient continues to be at-risk for clinically significant deterioration / failure due to the above mentioned dysfunctional, unstable organ systems.  I have personally  identified and managed all complex critical care issues to prevent  aforementioned clinical deterioration.  Critical care time is spent at bedside and/or the immediate area and has included, but is not limited to, the review of diagnostic tests, labs, radiographs, serial assessments of hemodynamics, respiratory status,  ventilatory management, and family updates.  Time spent in procedures and teaching are reported separately. Critical care time 31 minutes.             I spent 31 minutes in the professional and overall care of this patient.      Ivan Posadas MD

## 2023-10-03 NOTE — CARE PLAN
Problem: Fall/Injury  Goal: Not fall by end of shift  Outcome: Progressing  Goal: Be free from injury by end of the shift  Outcome: Progressing   The patient's goals for the shift include      The clinical goals for the shift include patient will have no seizure activity this shift.

## 2023-10-03 NOTE — CARE PLAN
The patient's goals for the shift include      The clinical goals for the shift include patient will have no seizure activity this shift.      Problem: Fall/Injury  Goal: Not fall by end of shift  Outcome: Progressing  Goal: Be free from injury by end of the shift  Outcome: Progressing  Goal: Verbalize understanding of personal risk factors for fall in the hospital  Outcome: Progressing  Goal: Verbalize understanding of risk factor reduction measures to prevent injury from fall in the home  Outcome: Progressing  Goal: Use assistive devices by end of the shift  Outcome: Progressing  Goal: Pace activities to prevent fatigue by end of the shift  Outcome: Progressing     Problem: Skin  Goal: Decreased wound size/increased tissue granulation at next dressing change  Outcome: Progressing  Goal: Participates in plan/prevention/treatment measures  Outcome: Progressing  Goal: Prevent/manage excess moisture  Outcome: Progressing  Goal: Prevent/minimize sheer/friction injuries  Outcome: Progressing  Goal: Promote/optimize nutrition  Outcome: Progressing  Goal: Promote skin healing  Outcome: Progressing     Problem: Pain - Adult  Goal: Verbalizes/displays adequate comfort level or baseline comfort level  Outcome: Progressing     Problem: Safety - Adult  Goal: Free from fall injury  Outcome: Progressing     Problem: Discharge Planning  Goal: Discharge to home or other facility with appropriate resources  Outcome: Progressing     Problem: Chronic Conditions and Co-morbidities  Goal: Patient's chronic conditions and co-morbidity symptoms are monitored and maintained or improved  Outcome: Progressing

## 2023-10-04 ENCOUNTER — APPOINTMENT (OUTPATIENT)
Dept: DIALYSIS | Facility: HOSPITAL | Age: 48
End: 2023-10-04
Payer: COMMERCIAL

## 2023-10-04 VITALS
HEART RATE: 74 BPM | TEMPERATURE: 97 F | OXYGEN SATURATION: 96 % | WEIGHT: 207.23 LBS | SYSTOLIC BLOOD PRESSURE: 121 MMHG | RESPIRATION RATE: 14 BRPM | HEIGHT: 67 IN | BODY MASS INDEX: 32.53 KG/M2 | DIASTOLIC BLOOD PRESSURE: 87 MMHG

## 2023-10-04 LAB
ALBUMIN SERPL BCP-MCNC: 3.2 G/DL (ref 3.4–5)
ANION GAP SERPL CALC-SCNC: 21 MMOL/L (ref 10–20)
BUN SERPL-MCNC: 79 MG/DL (ref 6–23)
CALCIUM SERPL-MCNC: 5.5 MG/DL (ref 8.6–10.3)
CHLORIDE SERPL-SCNC: 99 MMOL/L (ref 98–107)
CO2 SERPL-SCNC: 21 MMOL/L (ref 21–32)
CREAT SERPL-MCNC: 17.6 MG/DL (ref 0.5–1.3)
GFR SERPL CREATININE-BSD FRML MDRD: 3 ML/MIN/1.73M*2
GLUCOSE SERPL-MCNC: 124 MG/DL (ref 74–99)
PHOSPHATE SERPL-MCNC: 5 MG/DL (ref 2.5–4.9)
POTASSIUM SERPL-SCNC: 3 MMOL/L (ref 3.5–5.3)
SODIUM SERPL-SCNC: 138 MMOL/L (ref 136–145)

## 2023-10-04 PROCEDURE — 99239 HOSP IP/OBS DSCHRG MGMT >30: CPT | Performed by: INTERNAL MEDICINE

## 2023-10-04 PROCEDURE — 2500000001 HC RX 250 WO HCPCS SELF ADMINISTERED DRUGS (ALT 637 FOR MEDICARE OP): Performed by: EMERGENCY MEDICINE

## 2023-10-04 PROCEDURE — 87340 HEPATITIS B SURFACE AG IA: CPT | Mod: AHULAB,CMCLAB | Performed by: INTERNAL MEDICINE

## 2023-10-04 PROCEDURE — 36415 COLL VENOUS BLD VENIPUNCTURE: CPT | Performed by: REGISTERED NURSE

## 2023-10-04 PROCEDURE — 2500000004 HC RX 250 GENERAL PHARMACY W/ HCPCS (ALT 636 FOR OP/ED): Performed by: EMERGENCY MEDICINE

## 2023-10-04 PROCEDURE — 86706 HEP B SURFACE ANTIBODY: CPT | Mod: AHULAB,CMCLAB | Performed by: INTERNAL MEDICINE

## 2023-10-04 PROCEDURE — 8010000001 HC DIALYSIS - HEMODIALYSIS PER DAY

## 2023-10-04 PROCEDURE — 80069 RENAL FUNCTION PANEL: CPT | Performed by: REGISTERED NURSE

## 2023-10-04 PROCEDURE — 2500000001 HC RX 250 WO HCPCS SELF ADMINISTERED DRUGS (ALT 637 FOR MEDICARE OP): Performed by: INTERNAL MEDICINE

## 2023-10-04 PROCEDURE — 2500000004 HC RX 250 GENERAL PHARMACY W/ HCPCS (ALT 636 FOR OP/ED): Performed by: ANESTHESIOLOGY

## 2023-10-04 PROCEDURE — 36415 COLL VENOUS BLD VENIPUNCTURE: CPT | Mod: AHULAB | Performed by: INTERNAL MEDICINE

## 2023-10-04 PROCEDURE — 6350000001 HC RX 635 EPOETIN >10,000 UNITS: Mod: JZ | Performed by: EMERGENCY MEDICINE

## 2023-10-04 RX ORDER — CALCIUM CARBONATE 200(500)MG
1000 TABLET,CHEWABLE ORAL 2 TIMES DAILY
Status: DISCONTINUED | OUTPATIENT
Start: 2023-10-04 | End: 2023-10-04 | Stop reason: HOSPADM

## 2023-10-04 RX ORDER — LEVETIRACETAM 500 MG/1
500 TABLET ORAL 2 TIMES DAILY
Qty: 60 TABLET | Refills: 0 | Status: ON HOLD | OUTPATIENT
Start: 2023-10-04 | End: 2024-02-08

## 2023-10-04 RX ADMIN — LEVETIRACETAM 500 MG: 5 INJECTION INTRAVENOUS at 00:17

## 2023-10-04 RX ADMIN — LEVETIRACETAM 500 MG: 5 INJECTION INTRAVENOUS at 12:49

## 2023-10-04 RX ADMIN — ATORVASTATIN CALCIUM 80 MG: 80 TABLET, FILM COATED ORAL at 11:29

## 2023-10-04 RX ADMIN — CALCIUM CARBONATE (ANTACID) CHEW TAB 500 MG 1000 MG: 500 CHEW TAB at 15:16

## 2023-10-04 RX ADMIN — EPOETIN ALFA-EPBX 10000 UNITS: 10000 INJECTION, SOLUTION INTRAVENOUS; SUBCUTANEOUS at 12:51

## 2023-10-04 ASSESSMENT — COGNITIVE AND FUNCTIONAL STATUS - GENERAL
CLIMB 3 TO 5 STEPS WITH RAILING: A LITTLE
PATIENT BASELINE BEDBOUND: NO
DAILY ACTIVITIY SCORE: 24
CLIMB 3 TO 5 STEPS WITH RAILING: A LITTLE
MOBILITY SCORE: 23
MOBILITY SCORE: 23
DAILY ACTIVITIY SCORE: 24

## 2023-10-04 ASSESSMENT — PAIN - FUNCTIONAL ASSESSMENT
PAIN_FUNCTIONAL_ASSESSMENT: 0-10
PAIN_FUNCTIONAL_ASSESSMENT: NO/DENIES PAIN
PAIN_FUNCTIONAL_ASSESSMENT: NO/DENIES PAIN
PAIN_FUNCTIONAL_ASSESSMENT: 0-10

## 2023-10-04 ASSESSMENT — PAIN SCALES - GENERAL
PAINLEVEL_OUTOF10: 0 - NO PAIN

## 2023-10-04 NOTE — PROGRESS NOTES
10/4/23  1500     Met with patient at bedside and explained my role as care coordinator. Patient is alert and oriented x3. He lives with his wife Lala ( [phone] ) in the house. He is independent with his care at home. He drives and works. Denies any ambulatory devices. No oxygen in use. Patient is dialysis patient and goes to [facility], M-W-F are his days and chair time is 3:30 pm-7 pm. His nephrologist is Dr. Gonzalez. Patient doesn't have PCP, instructed to find one and offered list but patient stated will look himself. Patient will go home, denies any needs.  CANDIDO Garner TCC

## 2023-10-04 NOTE — PROGRESS NOTES
"Nephrology Consult Progress Note    Admit Date: 9/30/2023    Assessment and Plan:  Pt with ESKD, HTN, came with altered MS, weakness, missing HD, seizure  - ESKD on HD: euvolemic  Seen on HD now, next Friday, changing to higher K bath  - HTN: controlled  - mild hypoK  - BMD: hypoCa and hyperP, changed to phoslo   Also adding po Ca carbonate for now   - Hb 7.3  On epogen      Interval history:  No complaints     CURRENT MEDICATIONS:  reviewed      Intake/Output Summary (Last 24 hours) at 10/4/2023 1405  Last data filed at 10/4/2023 1224  Gross per 24 hour   Intake 1100 ml   Output --   Net 1100 ml         PHYSICAL EXAM:  /82   Pulse 75   Temp 36.1 °C (97 °F) (Temporal)   Resp 17   Ht 1.707 m (5' 7.21\")   Wt 94 kg (207 lb 3.7 oz)   SpO2 95%   BMI 32.26 kg/m²     Intake/Output Summary (Last 24 hours) at 10/4/2023 1405  Last data filed at 10/4/2023 1224  Gross per 24 hour   Intake 1100 ml   Output --   Net 1100 ml       Gen: AAO, NAD  Neck: No JVD  Cardiac: RRR  Resp: clear   Ext: no edema   Access: LUE AVF  Neuro: moves 4 ext  Peripheral Pulses: Capillary refill <2secs, strong peripheral pulses.  Skin: Skin color, texture, turgor normal, no suspicious rashes or lesions.    Labs:  Results for orders placed or performed during the hospital encounter of 09/30/23 (from the past 24 hour(s))   Renal function panel   Result Value Ref Range    Glucose 124 (H) 74 - 99 mg/dL    Sodium 138 136 - 145 mmol/L    Potassium 3.0 (L) 3.5 - 5.3 mmol/L    Chloride 99 98 - 107 mmol/L    Bicarbonate 21 21 - 32 mmol/L    Anion Gap 21 (H) 10 - 20 mmol/L    Urea Nitrogen 79 (H) 6 - 23 mg/dL    Creatinine 17.60 (H) 0.50 - 1.30 mg/dL    eGFR 3 (L) >60 mL/min/1.73m*2    Calcium 5.5 (LL) 8.6 - 10.3 mg/dL    Phosphorus 5.0 (H) 2.5 - 4.9 mg/dL    Albumin 3.2 (L) 3.4 - 5.0 g/dL           DATA:   Diagnostic tests reviewed for today's visit:    Labs and imaging      Will continue to follow.   Time spent on consult progress: 35 " min    Signature: Abel Wiseman MD

## 2023-10-04 NOTE — DISCHARGE SUMMARY
Discharge Diagnosis  Hematuria    Issues Requiring Follow-Up  Continued outpatient follow-up with epileptologist.    Discharge Meds     Your medication list        START taking these medications        Instructions Last Dose Given Next Dose Due   levETIRAcetam 500 mg tablet  Commonly known as: Keppra      Take 1 tablet (500 mg) by mouth 2 times a day.              CONTINUE taking these medications        Instructions Last Dose Given Next Dose Due   allopurinol 100 mg tablet  Commonly known as: Zyloprim           amLODIPine 10 mg tablet  Commonly known as: Norvasc           atorvastatin 80 mg tablet  Commonly known as: Lipitor           carvedilol 25 mg tablet  Commonly known as: Coreg           cholecalciferol 50 mcg (2,000 unit) capsule  Commonly known as: Vitamin D-3           hydrALAZINE 50 mg tablet  Commonly known as: Apresoline           Renvela 800 mg tablet  Generic drug: sevelamer carbonate                     Where to Get Your Medications        These medications were sent to [id number] - [place], [place] - [address]  [address], [place] [facility] [address]      Phone: [phone]   levETIRAcetam 500 mg tablet         Test Results Pending At Discharge  Pending Labs       Order Current Status    Blood Gas Arterial Full Panel Collected (09/30/23 0858)    Blood Gas Arterial Full Panel Collected (09/30/23 1156)    Blood Gas Arterial Full Panel Collected (09/30/23 1444)    Blood Gas Venous Full Panel Collected (09/30/23 1716)    Potassium Collected (10/01/23 1011)    Hepatitis B surface antibody In process    Hepatitis B surface antigen In process    Blood Culture Preliminary result    Blood Culture Preliminary result            Hospital Course   Soren was admitted to hospital after missing several rounds of dialysis.  Unfortunately during episode of his dialysis he did have 2 seizures back-to-back.  He was evaluated by the neurology team.  They did start him on renally  dosed Keppra.  He had an MRI that was fairly unremarkable as well as an EEG that was normal.  He had no further epileptic activity here in the hospital.  He continued to get dialysis throughout his stay.  He tolerated this well.  At this time he is otherwise hemodynamically stable and appropriate for discharge.  We did discuss with him in depth the patient should not drive for at least the next 6 months or until he follows up with an epileptologist and is cleared by them.  We also discussed the importance of continuing to take his medication as he could potentially have further seizures in the future.  At this time he is otherwise hemodynamic stable appropriate for discharge.  This plan was discussed with him and he is in agreement.    Pertinent Physical Exam At Time of Discharge  Physical Exam  Vitals and nursing note reviewed.   Constitutional:       Appearance: Normal appearance. He is not ill-appearing.   HENT:      Head: Normocephalic and atraumatic.   Cardiovascular:      Rate and Rhythm: Normal rate and regular rhythm.      Heart sounds: Normal heart sounds.   Pulmonary:      Effort: Pulmonary effort is normal. No respiratory distress.      Breath sounds: Normal breath sounds. No wheezing.   Abdominal:      General: Abdomen is flat. Bowel sounds are normal.      Palpations: Abdomen is soft.   Musculoskeletal:         General: Normal range of motion.   Skin:     General: Skin is warm and dry.      Capillary Refill: Capillary refill takes less than 2 seconds.   Neurological:      General: No focal deficit present.      Mental Status: He is alert and oriented to person, place, and time.   Psychiatric:         Mood and Affect: Mood normal.         Behavior: Behavior normal.         Outpatient Follow-Up  No future appointments.      Don Deluna DO    Time spent during this discharge: >30 minutes

## 2023-10-05 LAB
BACTERIA BLD CULT: NORMAL
BACTERIA BLD CULT: NORMAL
HBV SURFACE AB SER-ACNC: <3.1 MIU/ML
HBV SURFACE AG SERPL QL IA: NONREACTIVE

## 2023-11-27 NOTE — ED PROVIDER NOTES
HPI   No chief complaint on file.      HPI: []  48-year-old  male history of ESRD on hemodialysis noncompliant angiodysplasia of the duodenum no GI bleed COPD today comes in with shortness of breath.  He missed about 1 weeks of hemodialysis.  Denies abdominal pain or nausea vomit diarrhea fever chills cough tension incontinence seizures syncope or near syncope.    Past history: Hypertension, ESRD, angiodysplasia  Social: Patient denies current tobacco alcohol drug abuse.  REVIEW OF SYSTEMS:    GENERAL.: No weight loss, fatigue, anorexia, insomnia, fever.    EYES: No vision loss, double vision, drainage, eye pain.    ENT: No pharyngitis, dry mouth.    CARDIOPULMONARY: No chest pain, palpitations, syncope, near syncope.  Positive for shortness of breath, cough, hemoptysis.    GI: No abdominal pain, change in bowel habits, melena, hematemesis, hematochezia, nausea, vomiting, diarrhea.    : No discharge, dysuria, frequency, urgency, hematuria.    MS: No limb pain, joint pain, joint swelling.    SKIN: No rashes.    PSYCH: No depression, anxiety, suicidality, homicidality.    Review of systems is otherwise negative unless stated above or in history of present illness.  Social history, family history, allergies reviewed.  PHYSICAL EXAM:    GENERAL: Vitals noted, moderate distress. Alert and oriented  x 3. Non-toxic.      EENT: TMs clear. Posterior oropharynx unremarkable. No meningismus. No LAD.     NECK: Supple. Nontender. No midline tenderness.     CARDIAC: Tachycardic regular, rate, rhythm. No murmurs rubs or gallops. No JVD    PULMONARY: Tachypneic moderate distress air hunger moderate lungs have bibasilar crackles no wheezes rales or rhonchi.respiratory distress.     ABDOMEN: Soft, nonsurgical. Nontender. No peritoneal signs. Normoactive bowel sounds. No pulsatile masses.     EXTREMITIES: No peripheral edema. Negative Homans bilaterally, no cords.    SKIN: No rash. Intact.     NEURO: No focal neurologic  deficits, NIH score of 0. Cranial nerves normal as tested from II through XII.     MEDICAL DECISION MAKING:  EKG on my interpretation shows sinus tachycardia normal axis rate 120 with nonspecific ST and T wave change.    Labs concerning for profound metabolic acidosis    Chest x-ray negative    Treatment in ED: IV established, placed on cardiac monitor, given supplemental oxygen, given intravenous sodium bicarbonate multiple doses and sodium bicarbonate drip    Consult: Discussed with nephrology on-call and ICU attending    ED course: Patient remained in sinus condition and critical    Impression: #1 volume overload, #2 perform metabolic acidosis    Plan set MDM: 48-year male history of ESRD on hemodialysis noncompliant today presents with a perforin metabolic acidosis due to missed hemodialysis, low suspicion for septic shock STEMI NSTEMI or stroke patient will be hospitalized to ICU for further care.                          No data recorded                Patient History   Past Medical History:   Diagnosis Date   • Angiodysplasia of stomach and duodenum without bleeding     Gastric AV malformation   • COVID-19     COVID-19 virus infection   • Diverticulosis of intestine, part unspecified, without perforation or abscess without bleeding     Diverticulosis   • End stage renal disease (CMS/[facility]) 08/02/2021    ESRD (end stage renal disease)   • Other hemorrhoids     Internal hemorrhoid   • Personal history of diseases of the blood and blood-forming organs and certain disorders involving the immune mechanism     History of anemia   • Personal history of other diseases of the circulatory system     History of hypertension   • Personal history of other diseases of the musculoskeletal system and connective tissue     History of gout   • Personal history of other endocrine, nutritional and metabolic disease     History of obesity   • Personal history of other medical treatment     History of blood transfusion   • Residual  hemorrhoidal skin tags     External hemorrhoid   • Smoker    • Ulcer of anus and rectum     Rectal ulcer   • Unspecified osteoarthritis, unspecified site     Osteoarthritis     Past Surgical History:   Procedure Laterality Date   • CT ABDOMEN PELVIS ANGIOGRAM W AND/OR WO IV CONTRAST  5/24/2023    CT ABDOMEN PELVIS ANGIOGRAM W AND/OR WO IV CONTRAST 5/24/2023 [facility]   • IR VENOGRAM DIALYSIS  5/26/2023    IR VENOGRAM DIALYSIS 5/26/2023 [facility]GIO   • OTHER SURGICAL HISTORY  01/18/2022    Dialysis tunneled catheter placement   • OTHER SURGICAL HISTORY  01/19/2022    Arteriovenous fistula creation procedure     No family history on file.  Social History     Tobacco Use   • Smoking status: Not on file   • Smokeless tobacco: Not on file   Substance Use Topics   • Alcohol use: Not on file   • Drug use: Not on file       Physical Exam   ED Triage Vitals   Temp Heart Rate Resp BP   09/30/23 1000 09/30/23 0900 09/30/23 2000 09/30/23 1050   36.3 °C (97.4 °F) 105 14 (!) 195/100      SpO2 Temp Source Heart Rate Source Patient Position   09/30/23 0900 09/30/23 1516 09/30/23 1516 09/30/23 0916   100 % Skin Monitor Lying      BP Location FiO2 (%)     09/30/23 0916 --     Other (Comment)        Physical Exam    ED Course & MDM   ED Course as of 11/26/23 2129   Sun Oct 01, 2023   0500 Patient remains tachypneic and tachycardic currently on sodium bicarbonate drip, nephrology has been consulted as you been consulted and patient is going to the ICU for further care. [MT]      ED Course User Index  [MT] Meli Portillo MD         Diagnoses as of 11/26/23 2129   Hematuria   ESRD (end stage renal disease) (CMS/HCC)   Metabolic acidosis   Acute respiratory failure, unspecified whether with hypoxia or hypercapnia (CMS/HCC)       Medical Decision Making      Procedure  Procedures     Meli Portillo MD  11/26/23 2129

## 2024-01-04 DIAGNOSIS — M1A.3710 CHRONIC GOUT OF RIGHT FOOT DUE TO RENAL IMPAIRMENT WITHOUT TOPHUS: ICD-10-CM

## 2024-01-04 DIAGNOSIS — N18.6 ESRD (END STAGE RENAL DISEASE) (MULTI): ICD-10-CM

## 2024-01-04 DIAGNOSIS — I12.9 HYPERTENSIVE CHRONIC KIDNEY DISEASE WITH STAGE 1 THROUGH STAGE 4 CHRONIC KIDNEY DISEASE, OR UNSPECIFIED CHRONIC KIDNEY DISEASE: ICD-10-CM

## 2024-01-04 DIAGNOSIS — I15.8 OTHER SECONDARY HYPERTENSION: Primary | ICD-10-CM

## 2024-01-04 RX ORDER — LOSARTAN POTASSIUM 25 MG/1
25 TABLET ORAL DAILY
Qty: 30 TABLET | Refills: 11 | Status: SHIPPED | OUTPATIENT
Start: 2024-01-04 | End: 2025-01-03

## 2024-01-04 RX ORDER — CALCIUM ACETATE 667 MG/1
2000 CAPSULE ORAL
Qty: 270 CAPSULE | Refills: 5 | Status: SHIPPED | OUTPATIENT
Start: 2024-01-04 | End: 2024-07-02

## 2024-01-04 RX ORDER — HYDRALAZINE HYDROCHLORIDE 100 MG/1
100 TABLET, FILM COATED ORAL 2 TIMES DAILY
Qty: 60 TABLET | Refills: 6 | Status: SHIPPED | OUTPATIENT
Start: 2024-01-04

## 2024-01-04 RX ORDER — COLCHICINE 0.6 MG/1
0.6 TABLET ORAL DAILY PRN
Qty: 30 TABLET | Refills: 1 | Status: SHIPPED | OUTPATIENT
Start: 2024-01-04

## 2024-01-04 RX ORDER — LIDOCAINE AND PRILOCAINE 25; 25 MG/G; MG/G
CREAM TOPICAL
Qty: 30 G | Refills: 11 | Status: SHIPPED | OUTPATIENT
Start: 2024-01-04 | End: 2025-01-03

## 2024-02-05 ENCOUNTER — APPOINTMENT (OUTPATIENT)
Dept: RADIOLOGY | Facility: HOSPITAL | Age: 49
End: 2024-02-05
Payer: COMMERCIAL

## 2024-02-05 ENCOUNTER — HOSPITAL ENCOUNTER (INPATIENT)
Facility: HOSPITAL | Age: 49
LOS: 3 days | Discharge: HOME | End: 2024-02-08
Attending: EMERGENCY MEDICINE | Admitting: ANESTHESIOLOGY
Payer: COMMERCIAL

## 2024-02-05 ENCOUNTER — APPOINTMENT (OUTPATIENT)
Dept: CARDIOLOGY | Facility: HOSPITAL | Age: 49
End: 2024-02-05
Payer: COMMERCIAL

## 2024-02-05 DIAGNOSIS — R56.9 SEIZURE (MULTI): ICD-10-CM

## 2024-02-05 DIAGNOSIS — Z99.2 ESRD (END STAGE RENAL DISEASE) ON DIALYSIS (MULTI): Chronic | ICD-10-CM

## 2024-02-05 DIAGNOSIS — E87.20 METABOLIC ACIDOSIS: Primary | ICD-10-CM

## 2024-02-05 DIAGNOSIS — N18.6 ESRD (END STAGE RENAL DISEASE) ON DIALYSIS (MULTI): Chronic | ICD-10-CM

## 2024-02-05 DIAGNOSIS — N18.6 ESRD (END STAGE RENAL DISEASE) (MULTI): ICD-10-CM

## 2024-02-05 LAB
ALBUMIN SERPL BCP-MCNC: 3.6 G/DL (ref 3.4–5)
ALP SERPL-CCNC: 111 U/L (ref 33–120)
ALT SERPL W P-5'-P-CCNC: 47 U/L (ref 10–52)
ANION GAP BLDV CALCULATED.4IONS-SCNC: 31 MMOL/L (ref 10–25)
ANION GAP SERPL CALC-SCNC: 35 MMOL/L (ref 10–20)
AST SERPL W P-5'-P-CCNC: 45 U/L (ref 9–39)
BASE EXCESS BLDV CALC-SCNC: -22.8 MMOL/L (ref -2–3)
BASOPHILS # BLD AUTO: 0.08 X10*3/UL (ref 0–0.1)
BASOPHILS NFR BLD AUTO: 0.4 %
BILIRUB SERPL-MCNC: 0.4 MG/DL (ref 0–1.2)
BNP SERPL-MCNC: 158 PG/ML (ref 0–99)
BODY TEMPERATURE: 37 DEGREES CELSIUS
BUN SERPL-MCNC: 156 MG/DL (ref 6–23)
CA-I BLDV-SCNC: 0.68 MMOL/L (ref 1.1–1.33)
CALCIUM SERPL-MCNC: 6 MG/DL (ref 8.6–10.3)
CARDIAC TROPONIN I PNL SERPL HS: 134 NG/L (ref 0–20)
CHLORIDE BLDV-SCNC: 109 MMOL/L (ref 98–107)
CHLORIDE SERPL-SCNC: 103 MMOL/L (ref 98–107)
CO2 SERPL-SCNC: 5 MMOL/L (ref 21–32)
CREAT SERPL-MCNC: >25 MG/DL (ref 0.5–1.3)
EGFRCR SERPLBLD CKD-EPI 2021: ABNORMAL ML/MIN/{1.73_M2}
EOSINOPHIL # BLD AUTO: 0.17 X10*3/UL (ref 0–0.7)
EOSINOPHIL NFR BLD AUTO: 0.9 %
ERYTHROCYTE [DISTWIDTH] IN BLOOD BY AUTOMATED COUNT: 18.9 % (ref 11.5–14.5)
GLUCOSE BLDV-MCNC: 91 MG/DL (ref 74–99)
GLUCOSE SERPL-MCNC: 97 MG/DL (ref 74–99)
HCO3 BLDV-SCNC: 5.3 MMOL/L (ref 22–26)
HCT VFR BLD AUTO: 26.7 % (ref 41–52)
HCT VFR BLD EST: 25 % (ref 41–52)
HGB BLD-MCNC: 8.6 G/DL (ref 13.5–17.5)
HGB BLDV-MCNC: 8.4 G/DL (ref 13.5–17.5)
IMM GRANULOCYTES # BLD AUTO: 0.25 X10*3/UL (ref 0–0.7)
IMM GRANULOCYTES NFR BLD AUTO: 1.3 % (ref 0–0.9)
INHALED O2 CONCENTRATION: 21 %
INR PPP: 1.3 (ref 0.9–1.1)
LACTATE BLDV-SCNC: 1.2 MMOL/L (ref 0.4–2)
LACTATE SERPL-SCNC: 0.6 MMOL/L (ref 0.4–2)
LYMPHOCYTES # BLD AUTO: 1 X10*3/UL (ref 1.2–4.8)
LYMPHOCYTES NFR BLD AUTO: 5.3 %
MCH RBC QN AUTO: 24.8 PG (ref 26–34)
MCHC RBC AUTO-ENTMCNC: 32.2 G/DL (ref 32–36)
MCV RBC AUTO: 77 FL (ref 80–100)
MONOCYTES # BLD AUTO: 1.15 X10*3/UL (ref 0.1–1)
MONOCYTES NFR BLD AUTO: 6.1 %
NEUTROPHILS # BLD AUTO: 16.09 X10*3/UL (ref 1.2–7.7)
NEUTROPHILS NFR BLD AUTO: 86 %
NRBC BLD-RTO: 0.2 /100 WBCS (ref 0–0)
OXYHGB MFR BLDV: 82.7 % (ref 45–75)
PCO2 BLDV: 18 MM HG (ref 41–51)
PH BLDV: 7.08 PH (ref 7.33–7.43)
PLATELET # BLD AUTO: 296 X10*3/UL (ref 150–450)
PO2 BLDV: 64 MM HG (ref 35–45)
POTASSIUM BLDV-SCNC: 4.2 MMOL/L (ref 3.5–5.3)
POTASSIUM SERPL-SCNC: 4.7 MMOL/L (ref 3.5–5.3)
PROT SERPL-MCNC: 7.9 G/DL (ref 6.4–8.2)
PROTHROMBIN TIME: 14.6 SECONDS (ref 9.8–12.8)
RBC # BLD AUTO: 3.47 X10*6/UL (ref 4.5–5.9)
SAO2 % BLDV: 84 % (ref 45–75)
SODIUM BLDV-SCNC: 141 MMOL/L (ref 136–145)
SODIUM SERPL-SCNC: 138 MMOL/L (ref 136–145)
WBC # BLD AUTO: 18.7 X10*3/UL (ref 4.4–11.3)

## 2024-02-05 PROCEDURE — 71045 X-RAY EXAM CHEST 1 VIEW: CPT | Mod: FOREIGN READ | Performed by: RADIOLOGY

## 2024-02-05 PROCEDURE — 80053 COMPREHEN METABOLIC PANEL: CPT | Performed by: EMERGENCY MEDICINE

## 2024-02-05 PROCEDURE — 99291 CRITICAL CARE FIRST HOUR: CPT | Mod: 25 | Performed by: EMERGENCY MEDICINE

## 2024-02-05 PROCEDURE — 83605 ASSAY OF LACTIC ACID: CPT | Performed by: EMERGENCY MEDICINE

## 2024-02-05 PROCEDURE — 36415 COLL VENOUS BLD VENIPUNCTURE: CPT | Performed by: EMERGENCY MEDICINE

## 2024-02-05 PROCEDURE — 85610 PROTHROMBIN TIME: CPT | Performed by: EMERGENCY MEDICINE

## 2024-02-05 PROCEDURE — 85025 COMPLETE CBC W/AUTO DIFF WBC: CPT | Performed by: EMERGENCY MEDICINE

## 2024-02-05 PROCEDURE — 2500000005 HC RX 250 GENERAL PHARMACY W/O HCPCS: Performed by: EMERGENCY MEDICINE

## 2024-02-05 PROCEDURE — 2500000004 HC RX 250 GENERAL PHARMACY W/ HCPCS (ALT 636 FOR OP/ED): Performed by: EMERGENCY MEDICINE

## 2024-02-05 PROCEDURE — 87040 BLOOD CULTURE FOR BACTERIA: CPT | Mod: AHULAB | Performed by: EMERGENCY MEDICINE

## 2024-02-05 PROCEDURE — 96375 TX/PRO/DX INJ NEW DRUG ADDON: CPT

## 2024-02-05 PROCEDURE — 84132 ASSAY OF SERUM POTASSIUM: CPT | Performed by: EMERGENCY MEDICINE

## 2024-02-05 PROCEDURE — 5A1D70Z PERFORMANCE OF URINARY FILTRATION, INTERMITTENT, LESS THAN 6 HOURS PER DAY: ICD-10-PCS | Performed by: INTERNAL MEDICINE

## 2024-02-05 PROCEDURE — 84484 ASSAY OF TROPONIN QUANT: CPT | Performed by: EMERGENCY MEDICINE

## 2024-02-05 PROCEDURE — 71045 X-RAY EXAM CHEST 1 VIEW: CPT | Mod: FR

## 2024-02-05 PROCEDURE — 93005 ELECTROCARDIOGRAM TRACING: CPT

## 2024-02-05 PROCEDURE — 8010000001 HC DIALYSIS - HEMODIALYSIS PER DAY

## 2024-02-05 PROCEDURE — 1100000001 HC PRIVATE ROOM DAILY

## 2024-02-05 PROCEDURE — 96374 THER/PROPH/DIAG INJ IV PUSH: CPT

## 2024-02-05 PROCEDURE — 83880 ASSAY OF NATRIURETIC PEPTIDE: CPT | Performed by: EMERGENCY MEDICINE

## 2024-02-05 PROCEDURE — 2500000004 HC RX 250 GENERAL PHARMACY W/ HCPCS (ALT 636 FOR OP/ED)

## 2024-02-05 RX ORDER — LOSARTAN POTASSIUM 25 MG/1
25 TABLET ORAL DAILY
Status: DISCONTINUED | OUTPATIENT
Start: 2024-02-06 | End: 2024-02-08 | Stop reason: HOSPADM

## 2024-02-05 RX ORDER — SODIUM BICARBONATE 1 MEQ/ML
50 SYRINGE (ML) INTRAVENOUS ONCE
Status: COMPLETED | OUTPATIENT
Start: 2024-02-05 | End: 2024-02-05

## 2024-02-05 RX ORDER — AMLODIPINE BESYLATE 10 MG/1
10 TABLET ORAL DAILY
Status: DISCONTINUED | OUTPATIENT
Start: 2024-02-06 | End: 2024-02-08 | Stop reason: HOSPADM

## 2024-02-05 RX ORDER — METRONIDAZOLE 500 MG/100ML
500 INJECTION, SOLUTION INTRAVENOUS ONCE
Status: COMPLETED | OUTPATIENT
Start: 2024-02-05 | End: 2024-02-05

## 2024-02-05 RX ORDER — ATORVASTATIN CALCIUM 80 MG/1
80 TABLET, FILM COATED ORAL NIGHTLY
Status: DISCONTINUED | OUTPATIENT
Start: 2024-02-05 | End: 2024-02-08 | Stop reason: HOSPADM

## 2024-02-05 RX ORDER — HYDRALAZINE HYDROCHLORIDE 50 MG/1
100 TABLET, FILM COATED ORAL 2 TIMES DAILY
Status: DISCONTINUED | OUTPATIENT
Start: 2024-02-05 | End: 2024-02-08 | Stop reason: HOSPADM

## 2024-02-05 RX ORDER — COLCHICINE 0.6 MG/1
0.6 TABLET ORAL DAILY PRN
Status: DISCONTINUED | OUTPATIENT
Start: 2024-02-05 | End: 2024-02-07

## 2024-02-05 RX ORDER — CALCIUM ACETATE 667 MG/1
2000 CAPSULE ORAL
Status: DISCONTINUED | OUTPATIENT
Start: 2024-02-06 | End: 2024-02-08 | Stop reason: HOSPADM

## 2024-02-05 RX ORDER — CARVEDILOL 25 MG/1
25 TABLET ORAL EVERY 12 HOURS
Status: DISCONTINUED | OUTPATIENT
Start: 2024-02-05 | End: 2024-02-08 | Stop reason: HOSPADM

## 2024-02-05 RX ORDER — CHOLECALCIFEROL (VITAMIN D3) 25 MCG
2000 TABLET ORAL DAILY
Status: DISCONTINUED | OUTPATIENT
Start: 2024-02-06 | End: 2024-02-08 | Stop reason: HOSPADM

## 2024-02-05 RX ORDER — ALLOPURINOL 100 MG/1
100 TABLET ORAL DAILY
Status: DISCONTINUED | OUTPATIENT
Start: 2024-02-06 | End: 2024-02-08 | Stop reason: HOSPADM

## 2024-02-05 RX ADMIN — METRONIDAZOLE 500 MG: 500 INJECTION, SOLUTION INTRAVENOUS at 22:52

## 2024-02-05 RX ADMIN — SODIUM BICARBONATE 50 MEQ: 84 INJECTION INTRAVENOUS at 22:14

## 2024-02-05 RX ADMIN — SODIUM BICARBONATE 150 MEQ: 84 INJECTION, SOLUTION INTRAVENOUS at 23:18

## 2024-02-05 RX ADMIN — CEFEPIME 2 G: 2 INJECTION, POWDER, FOR SOLUTION INTRAVENOUS at 22:33

## 2024-02-05 ASSESSMENT — PAIN SCALES - GENERAL
PAINLEVEL_OUTOF10: 0 - NO PAIN
PAINLEVEL_OUTOF10: 0 - NO PAIN

## 2024-02-05 ASSESSMENT — PAIN - FUNCTIONAL ASSESSMENT: PAIN_FUNCTIONAL_ASSESSMENT: 0-10

## 2024-02-05 ASSESSMENT — COLUMBIA-SUICIDE SEVERITY RATING SCALE - C-SSRS
2. HAVE YOU ACTUALLY HAD ANY THOUGHTS OF KILLING YOURSELF?: NO
6. HAVE YOU EVER DONE ANYTHING, STARTED TO DO ANYTHING, OR PREPARED TO DO ANYTHING TO END YOUR LIFE?: NO
1. IN THE PAST MONTH, HAVE YOU WISHED YOU WERE DEAD OR WISHED YOU COULD GO TO SLEEP AND NOT WAKE UP?: NO

## 2024-02-06 ENCOUNTER — APPOINTMENT (OUTPATIENT)
Dept: DIALYSIS | Facility: HOSPITAL | Age: 49
End: 2024-02-06
Payer: COMMERCIAL

## 2024-02-06 ENCOUNTER — APPOINTMENT (OUTPATIENT)
Dept: CARDIOLOGY | Facility: HOSPITAL | Age: 49
End: 2024-02-06
Payer: COMMERCIAL

## 2024-02-06 LAB
ALBUMIN SERPL BCP-MCNC: 3.1 G/DL (ref 3.4–5)
ANION GAP SERPL CALC-SCNC: 28 MMOL/L (ref 10–20)
BUN SERPL-MCNC: 94 MG/DL (ref 6–23)
CALCIUM SERPL-MCNC: 6.7 MG/DL (ref 8.6–10.3)
CHLORIDE SERPL-SCNC: 100 MMOL/L (ref 98–107)
CO2 SERPL-SCNC: 14 MMOL/L (ref 21–32)
CREAT SERPL-MCNC: 22.85 MG/DL (ref 0.5–1.3)
EGFRCR SERPLBLD CKD-EPI 2021: 2 ML/MIN/1.73M*2
ERYTHROCYTE [DISTWIDTH] IN BLOOD BY AUTOMATED COUNT: 18 % (ref 11.5–14.5)
GLUCOSE SERPL-MCNC: 85 MG/DL (ref 74–99)
HCT VFR BLD AUTO: 25.3 % (ref 41–52)
HGB BLD-MCNC: 8.7 G/DL (ref 13.5–17.5)
MAGNESIUM SERPL-MCNC: 1.7 MG/DL (ref 1.6–2.4)
MCH RBC QN AUTO: 25.7 PG (ref 26–34)
MCHC RBC AUTO-ENTMCNC: 34.4 G/DL (ref 32–36)
MCV RBC AUTO: 75 FL (ref 80–100)
NRBC BLD-RTO: 0.6 /100 WBCS (ref 0–0)
PHOSPHATE SERPL-MCNC: 5 MG/DL (ref 2.5–4.9)
PLATELET # BLD AUTO: 267 X10*3/UL (ref 150–450)
POTASSIUM SERPL-SCNC: 2.5 MMOL/L (ref 3.5–5.3)
RBC # BLD AUTO: 3.39 X10*6/UL (ref 4.5–5.9)
SODIUM SERPL-SCNC: 139 MMOL/L (ref 136–145)
WBC # BLD AUTO: 16 X10*3/UL (ref 4.4–11.3)

## 2024-02-06 PROCEDURE — 2500000001 HC RX 250 WO HCPCS SELF ADMINISTERED DRUGS (ALT 637 FOR MEDICARE OP): Performed by: EMERGENCY MEDICINE

## 2024-02-06 PROCEDURE — 2500000004 HC RX 250 GENERAL PHARMACY W/ HCPCS (ALT 636 FOR OP/ED): Performed by: NURSE PRACTITIONER

## 2024-02-06 PROCEDURE — 93005 ELECTROCARDIOGRAM TRACING: CPT

## 2024-02-06 PROCEDURE — 84100 ASSAY OF PHOSPHORUS: CPT | Performed by: NURSE PRACTITIONER

## 2024-02-06 PROCEDURE — 8010000001 HC DIALYSIS - HEMODIALYSIS PER DAY

## 2024-02-06 PROCEDURE — 99223 1ST HOSP IP/OBS HIGH 75: CPT | Performed by: NURSE PRACTITIONER

## 2024-02-06 PROCEDURE — 36415 COLL VENOUS BLD VENIPUNCTURE: CPT | Performed by: NURSE PRACTITIONER

## 2024-02-06 PROCEDURE — 2500000004 HC RX 250 GENERAL PHARMACY W/ HCPCS (ALT 636 FOR OP/ED): Performed by: EMERGENCY MEDICINE

## 2024-02-06 PROCEDURE — 2500000001 HC RX 250 WO HCPCS SELF ADMINISTERED DRUGS (ALT 637 FOR MEDICARE OP): Performed by: NURSE PRACTITIONER

## 2024-02-06 PROCEDURE — 1200000002 HC GENERAL ROOM WITH TELEMETRY DAILY

## 2024-02-06 PROCEDURE — A4217 STERILE WATER/SALINE, 500 ML: HCPCS | Performed by: EMERGENCY MEDICINE

## 2024-02-06 PROCEDURE — 83735 ASSAY OF MAGNESIUM: CPT | Performed by: NURSE PRACTITIONER

## 2024-02-06 PROCEDURE — 2500000001 HC RX 250 WO HCPCS SELF ADMINISTERED DRUGS (ALT 637 FOR MEDICARE OP): Performed by: STUDENT IN AN ORGANIZED HEALTH CARE EDUCATION/TRAINING PROGRAM

## 2024-02-06 PROCEDURE — 85027 COMPLETE CBC AUTOMATED: CPT | Performed by: NURSE PRACTITIONER

## 2024-02-06 RX ORDER — POTASSIUM CHLORIDE 14.9 MG/ML
20 INJECTION INTRAVENOUS
Status: DISPENSED | OUTPATIENT
Start: 2024-02-06 | End: 2024-02-06

## 2024-02-06 RX ORDER — HEPARIN SODIUM 5000 [USP'U]/ML
5000 INJECTION, SOLUTION INTRAVENOUS; SUBCUTANEOUS EVERY 8 HOURS
Status: DISCONTINUED | OUTPATIENT
Start: 2024-02-06 | End: 2024-02-08 | Stop reason: HOSPADM

## 2024-02-06 RX ORDER — LEVETIRACETAM 500 MG/1
500 TABLET ORAL EVERY 12 HOURS
Status: DISCONTINUED | OUTPATIENT
Start: 2024-02-06 | End: 2024-02-08

## 2024-02-06 RX ORDER — POTASSIUM CHLORIDE 20 MEQ/1
40 TABLET, EXTENDED RELEASE ORAL ONCE
Status: COMPLETED | OUTPATIENT
Start: 2024-02-06 | End: 2024-02-06

## 2024-02-06 RX ADMIN — HEPARIN SODIUM 5000 UNITS: 5000 INJECTION INTRAVENOUS; SUBCUTANEOUS at 02:56

## 2024-02-06 RX ADMIN — LEVETIRACETAM 500 MG: 500 TABLET, FILM COATED ORAL at 22:20

## 2024-02-06 RX ADMIN — HEPARIN SODIUM 5000 UNITS: 5000 INJECTION INTRAVENOUS; SUBCUTANEOUS at 10:45

## 2024-02-06 RX ADMIN — ATORVASTATIN CALCIUM 80 MG: 80 TABLET, FILM COATED ORAL at 02:56

## 2024-02-06 RX ADMIN — Medication 2000 UNITS: at 06:06

## 2024-02-06 RX ADMIN — HYDRALAZINE HYDROCHLORIDE 100 MG: 25 TABLET, FILM COATED ORAL at 02:17

## 2024-02-06 RX ADMIN — VANCOMYCIN HYDROCHLORIDE 2 G: 5 INJECTION, POWDER, LYOPHILIZED, FOR SOLUTION INTRAVENOUS at 00:14

## 2024-02-06 RX ADMIN — CARVEDILOL 25 MG: 25 TABLET, FILM COATED ORAL at 22:20

## 2024-02-06 RX ADMIN — ATORVASTATIN CALCIUM 80 MG: 80 TABLET, FILM COATED ORAL at 22:20

## 2024-02-06 RX ADMIN — HYDRALAZINE HYDROCHLORIDE 100 MG: 25 TABLET, FILM COATED ORAL at 09:18

## 2024-02-06 RX ADMIN — POTASSIUM CHLORIDE 40 MEQ: 1500 TABLET, EXTENDED RELEASE ORAL at 06:06

## 2024-02-06 RX ADMIN — LEVETIRACETAM 500 MG: 500 TABLET, FILM COATED ORAL at 10:15

## 2024-02-06 RX ADMIN — CARVEDILOL 25 MG: 25 TABLET, FILM COATED ORAL at 02:17

## 2024-02-06 RX ADMIN — AMLODIPINE BESYLATE 10 MG: 10 TABLET ORAL at 09:17

## 2024-02-06 RX ADMIN — POTASSIUM CHLORIDE 20 MEQ: 14.9 INJECTION, SOLUTION INTRAVENOUS at 06:06

## 2024-02-06 SDOH — SOCIAL STABILITY: SOCIAL INSECURITY: ARE YOU OR HAVE YOU BEEN THREATENED OR ABUSED PHYSICALLY, EMOTIONALLY, OR SEXUALLY BY ANYONE?: NO

## 2024-02-06 SDOH — SOCIAL STABILITY: SOCIAL INSECURITY: DO YOU FEEL ANYONE HAS EXPLOITED OR TAKEN ADVANTAGE OF YOU FINANCIALLY OR OF YOUR PERSONAL PROPERTY?: NO

## 2024-02-06 SDOH — SOCIAL STABILITY: SOCIAL INSECURITY: DO YOU FEEL UNSAFE GOING BACK TO THE PLACE WHERE YOU ARE LIVING?: NO

## 2024-02-06 SDOH — SOCIAL STABILITY: SOCIAL INSECURITY: WERE YOU ABLE TO COMPLETE ALL THE BEHAVIORAL HEALTH SCREENINGS?: YES

## 2024-02-06 SDOH — SOCIAL STABILITY: SOCIAL INSECURITY: HAVE YOU HAD THOUGHTS OF HARMING ANYONE ELSE?: NO

## 2024-02-06 SDOH — SOCIAL STABILITY: SOCIAL INSECURITY: ABUSE: ADULT

## 2024-02-06 SDOH — SOCIAL STABILITY: SOCIAL INSECURITY: ARE THERE ANY APPARENT SIGNS OF INJURIES/BEHAVIORS THAT COULD BE RELATED TO ABUSE/NEGLECT?: NO

## 2024-02-06 SDOH — SOCIAL STABILITY: SOCIAL INSECURITY: DOES ANYONE TRY TO KEEP YOU FROM HAVING/CONTACTING OTHER FRIENDS OR DOING THINGS OUTSIDE YOUR HOME?: NO

## 2024-02-06 SDOH — SOCIAL STABILITY: SOCIAL INSECURITY: HAS ANYONE EVER THREATENED TO HURT YOUR FAMILY OR YOUR PETS?: NO

## 2024-02-06 ASSESSMENT — LIFESTYLE VARIABLES
HOW OFTEN DO YOU HAVE A DRINK CONTAINING ALCOHOL: 2-4 TIMES A MONTH
AUDIT-C TOTAL SCORE: -1
HOW OFTEN DO YOU HAVE 6 OR MORE DRINKS ON ONE OCCASION: NEVER
SKIP TO QUESTIONS 9-10: 0
HOW MANY STANDARD DRINKS CONTAINING ALCOHOL DO YOU HAVE ON A TYPICAL DAY: PATIENT UNABLE TO ANSWER
AUDIT-C TOTAL SCORE: -1

## 2024-02-06 ASSESSMENT — PAIN SCALES - GENERAL
PAINLEVEL_OUTOF10: 0 - NO PAIN

## 2024-02-06 ASSESSMENT — ACTIVITIES OF DAILY LIVING (ADL)
LACK_OF_TRANSPORTATION: NO
WALKS IN HOME: INDEPENDENT
JUDGMENT_ADEQUATE_SAFELY_COMPLETE_DAILY_ACTIVITIES: YES
TOILETING: INDEPENDENT
DRESSING YOURSELF: INDEPENDENT
HEARING - RIGHT EAR: FUNCTIONAL
FEEDING YOURSELF: INDEPENDENT
ADEQUATE_TO_COMPLETE_ADL: YES
PATIENT'S MEMORY ADEQUATE TO SAFELY COMPLETE DAILY ACTIVITIES?: YES
GROOMING: INDEPENDENT
HEARING - LEFT EAR: FUNCTIONAL
BATHING: INDEPENDENT

## 2024-02-06 ASSESSMENT — ENCOUNTER SYMPTOMS
SHORTNESS OF BREATH: 1
DIARRHEA: 1
COUGH: 1
FATIGUE: 1

## 2024-02-06 ASSESSMENT — PAIN - FUNCTIONAL ASSESSMENT
PAIN_FUNCTIONAL_ASSESSMENT: 0-10
PAIN_FUNCTIONAL_ASSESSMENT: NO/DENIES PAIN
PAIN_FUNCTIONAL_ASSESSMENT: 0-10

## 2024-02-06 ASSESSMENT — PATIENT HEALTH QUESTIONNAIRE - PHQ9
SUM OF ALL RESPONSES TO PHQ9 QUESTIONS 1 & 2: 0
2. FEELING DOWN, DEPRESSED OR HOPELESS: NOT AT ALL
1. LITTLE INTEREST OR PLEASURE IN DOING THINGS: NOT AT ALL

## 2024-02-06 NOTE — PROGRESS NOTES
Soren Howard is a 48 y.o. male on day 1 of admission presenting with Metabolic acidosis.      Subjective   History Of Present Illness  Soren Howard is a 48 y.o. male with Pmhx CKD on HD with multiple hospitalization 2/2 missed HD  , HTN, HLD, Seizure, gout, obesity, angiodysplasia of stomach/duodenum presented to [facility] ED from home d/t SOB. Over the past few days has been having non bloody diarrhea, associated with abdmonal pain, also c/o of gen weakness, he missed his HD sessions from priod  but did come today's session but only partially able to continue with HD session because he was not feeling well ~2hours.      In the ER patient was tachypnic, Kausmaul, breathing  he was on Room Air. Hypertensive sbp was 160mmhg. Afebrile   ER worked up showed profound Metabolic Acidosis pH 7.08 CalBicarb was 5 anion gap 35 BUN/Scr 156/>25.0 K was normal BNP was 159 Trop was 134 Lactic was normal   WBC was 18.7 with neutrophilia H/H 8.6/26 (baseline around~7Hgb) BC was ordered, He said he still makes urine.   He was given Sodium Bicarb push and started on NaBicarb. He was also given atb cefepime, vancomycin and Flagyl.      ER reach out to nephorlogy and was ordered for stat HD.        Objective     Last Recorded Vitals  /74   Pulse 92   Temp 36.5 °C (97.7 °F) (Temporal)   Resp 16   Wt 85.6 kg (188 lb 11.4 oz)   SpO2 99%   Intake/Output last 3 Shifts:    Intake/Output Summary (Last 24 hours) at 2/6/2024 1500  Last data filed at 2/6/2024 1250  Gross per 24 hour   Intake 5100 ml   Output 2500 ml   Net 2600 ml       Admission Weight  Weight: 102 kg (224 lb) (02/05/24 1722)    Daily Weight  02/06/24 : 85.6 kg (188 lb 11.4 oz)    Image Results  ECG 12 lead  Normal sinus rhythm  Possible Anteroseptal infarct (cited on or before 30-SEP-2023)  T wave abnormality, consider lateral ischemia  Prolonged QT  Abnormal ECG  When compared with ECG of 30-SEP-2023 10:34,  Significant changes have  occurred      Physical Exam    Relevant Results    Results for orders placed or performed during the hospital encounter of 02/05/24 (from the past 24 hour(s))   CBC   Result Value Ref Range    WBC 16.0 (H) 4.4 - 11.3 x10*3/uL    nRBC 0.6 (H) 0.0 - 0.0 /100 WBCs    RBC 3.39 (L) 4.50 - 5.90 x10*6/uL    Hemoglobin 8.7 (L) 13.5 - 17.5 g/dL    Hematocrit 25.3 (L) 41.0 - 52.0 %    MCV 75 (L) 80 - 100 fL    MCH 25.7 (L) 26.0 - 34.0 pg    MCHC 34.4 32.0 - 36.0 g/dL    RDW 18.0 (H) 11.5 - 14.5 %    Platelets 267 150 - 450 x10*3/uL   Renal Function Panel   Result Value Ref Range    Glucose 85 74 - 99 mg/dL    Sodium 139 136 - 145 mmol/L    Potassium 2.5 (LL) 3.5 - 5.3 mmol/L    Chloride 100 98 - 107 mmol/L    Bicarbonate 14 (L) 21 - 32 mmol/L    Anion Gap 28 (H) 10 - 20 mmol/L    Urea Nitrogen 94 (HH) 6 - 23 mg/dL    Creatinine 22.85 (H) 0.50 - 1.30 mg/dL    eGFR 2 (L) >60 mL/min/1.73m*2    Calcium 6.7 (L) 8.6 - 10.3 mg/dL    Phosphorus 5.0 (H) 2.5 - 4.9 mg/dL    Albumin 3.1 (L) 3.4 - 5.0 g/dL   Magnesium   Result Value Ref Range    Magnesium 1.70 1.60 - 2.40 mg/dL        Scheduled medications  allopurinol, 100 mg, oral, Daily  alteplase, 2 mg, intra-catheter, Daily  alteplase, 2 mg, intra-catheter, Daily  alteplase, 2 mg, intra-catheter, Daily  alteplase, 2 mg, intra-catheter, Daily  amLODIPine, 10 mg, oral, Daily  atorvastatin, 80 mg, oral, Nightly  calcium acetate, 2,000 mg, oral, TID with meals  carvedilol, 25 mg, oral, q12h  cholecalciferol, 2,000 Units, oral, Daily  epoetin tavia or biosimilar, 100 Units/kg, subcutaneous, Once per day on Mon Wed Fri  heparin (porcine), 5,000 Units, subcutaneous, q8h  [Held by provider] hydrALAZINE, 100 mg, oral, BID  levETIRAcetam, 500 mg, oral, q12h  [Held by provider] losartan, 25 mg, oral, Daily      Continuous medications     PRN medications  PRN medications: colchicine, oxygen         Assessment/Plan   Metabolic acidosis  2/2 Missed HD, compliance  Diarrhea, ? Infection   Chronic  Anemia   Hx of Hypertension   Hx of seizure     Seizure  -continue with keppra   -Q1 neuro check per ICU protocol  -CAM ICU score q-shift  -Sleep/wake cycle hygiene  -Delirium precaution       Hx  HLD. Hypertension   -continue home b/p medications   -Continuous cardiac monitoring and hourly vital signs    -Electrolytes daily monitoring            Craol Mosqueda MD

## 2024-02-06 NOTE — PROGRESS NOTES
Care Coordinator Note:  TCC spoke with patient regarding dc planning. Demo is correct. Lives at [facility] IND with wife. PCP is dr murcia (renal). Patient is HD MWF at [facility].  Pharm is [facility] in [facility].   Denies falls and dme.     Plan: patient in due to missed HD and Met acidosis. Received HD at bedside currently. Neph is consulted for management.     Status: inpatient  Payor: [employer], jagjit strong  Disposition: [facility] with wife   Barrier: renal   ADOD: 1-2 days    Ashley Christy [facility]      02/06/24 1208   Discharge Planning   Living Arrangements Spouse/significant other   Support Systems Spouse/significant other   Assistance Needed IND at home with wife   Type of Residence Private residence   Number of Stairs to Enter Residence 4   Number of Stairs Within Residence 10   Do you have animals or pets at home? No   Who is requesting discharge planning? Provider   Home or Post Acute Services None   Patient expects to be discharged to: H[facility] with wife   Does the patient need discharge transport arranged? Yes   RoundTrip coordination needed? Yes   Has discharge transport been arranged? No   Financial Resource Strain   How hard is it for you to pay for the very basics like food, housing, medical care, and heating? Not hard   Housing Stability   In the last 12 months, was there a time when you were not able to pay the mortgage or rent on time? N   In the last 12 months, was there a time when you did not have a steady place to sleep or slept in a shelter (including now)? N   Transportation Needs   In the past 12 months, has lack of transportation kept you from medical appointments or from getting medications? no   In the past 12 months, has lack of transportation kept you from meetings, work, or from getting things needed for daily living? No

## 2024-02-06 NOTE — PROGRESS NOTES
.Report to Receiving RN:    Report To: America Fisher RN  Time Report Called: report given at pt bedside.  Hand-Off Communication: report given at pt beside. See treatment commnets.  Complications During Treatment: Yes  Ultrafiltration Treatment: Yes  Medications Administered During Dialysis: Yes  Blood Products Administered During Dialysis: No  Labs Sent During Dialysis: No  Heparin Drip Rate Changes: No    Electronic Signatures:  MIGUE Valadez (Signed)   Authored: MIGUE Valadez   (Signed )   Authored:     Last Updated: 2:31 AM by SKY LOPEZ          Detail Level: Simple Detail Level: Generalized Include Location In Plan?: No

## 2024-02-06 NOTE — H&P
History Of Present Illness  Soren Howard is a 48 y.o. male with Pmhx CKD on HD with multiple hospitalization 2/2 missed HD  , HTN, HLD, Seizure, gout, obesity, angiodysplasia of stomach/duodenum presented to [facility] ED from home d/t SOB. Over the past few days has been having non bloody diarrhea, associated with abdmonal pain, also c/o of gen weakness, he missed his HD sessions from priod  but did come today's session but only partially able to continue with HD session because he was not feeling well ~2hours.     In the ER patient was tachypnic, Kausmaul, breathing  he was on Room Air. Hypertensive sbp was 160mmhg. Afebrile   ER worked up showed profound Metabolic Acidosis pH 7.08 CalBicarb was 5 anion gap 35 BUN/Scr 156/>25.0 K was normal BNP was 159 Trop was 134 Lactic was normal   WBC was 18.7 with neutrophilia H/H 8.6/26 (baseline around~7Hgb) BC was ordered, He said he still makes urine.   He was given Sodium Bicarb push and started on NaBicarb. He was also given atb cefepime, vancomycin and Flagyl.     ER reach out to nephorlogy and was ordered for stat HD.        Past Medical History  Past Medical History:   Diagnosis Date    Angiodysplasia of stomach and duodenum without bleeding     Gastric AV malformation    COVID-19     COVID-19 virus infection    Diverticulosis of intestine, part unspecified, without perforation or abscess without bleeding     Diverticulosis    End stage renal disease (CMS/[facility]) 08/02/2021    ESRD (end stage renal disease)    Other hemorrhoids     Internal hemorrhoid    Personal history of diseases of the blood and blood-forming organs and certain disorders involving the immune mechanism     History of anemia    Personal history of other diseases of the circulatory system     History of hypertension    Personal history of other diseases of the musculoskeletal system and connective tissue     History of gout    Personal history of other endocrine, nutritional and metabolic  disease     History of obesity    Personal history of other medical treatment     History of blood transfusion    Residual hemorrhoidal skin tags     External hemorrhoid    Smoker     Ulcer of anus and rectum     Rectal ulcer    Unspecified osteoarthritis, unspecified site     Osteoarthritis       Surgical History  Past Surgical History:   Procedure Laterality Date    CT ABDOMEN PELVIS ANGIOGRAM W AND/OR WO IV CONTRAST  5/24/2023    CT ABDOMEN PELVIS ANGIOGRAM W AND/OR WO IV CONTRAST 5/24/2023 [facility] CT    IR VENOGRAM DIALYSIS  5/26/2023    IR VENOGRAM DIALYSIS 5/26/2023 [facility]GIO    OTHER SURGICAL HISTORY  01/18/2022    Dialysis tunneled catheter placement    OTHER SURGICAL HISTORY  01/19/2022    Arteriovenous fistula creation procedure        Social History  Denies smoking, denies use of illicit drug use  Occasional alcohol drinker     Family History  No family history on file.     Allergies  Piperacillin-tazobactam-dextrs    Review of Systems   Constitutional:  Positive for fatigue.   Respiratory:  Positive for cough and shortness of breath.    Cardiovascular:  Positive for leg swelling.   Gastrointestinal:  Positive for diarrhea.        Physical Exam  Constitutional:       Appearance: He is ill-appearing.   HENT:      Mouth/Throat:      Mouth: Mucous membranes are dry.   Eyes:      Extraocular Movements: Extraocular movements intact.      Pupils: Pupils are equal, round, and reactive to light.   Cardiovascular:      Rate and Rhythm: Normal rate and regular rhythm.      Pulses: Normal pulses.      Heart sounds: Normal heart sounds.   Pulmonary:      Effort: Tachypnea present.   Abdominal:      General: Bowel sounds are normal.      Palpations: Abdomen is soft.   Musculoskeletal:      Cervical back: Normal range of motion.      Left lower leg: Edema present.   Skin:     General: Skin is warm and dry.   Neurological:      Mental Status: He is alert and oriented to person, place, and time.          Last Recorded  "Vitals  Blood pressure (!) 166/99, pulse 95, temperature 36.2 °C (97.2 °F), temperature source Oral, resp. rate (!) 32, height 1.702 m (5' 7\"), weight 102 kg (224 lb), SpO2 99 %.    Relevant Results  No current facility-administered medications on file prior to encounter.     Current Outpatient Medications on File Prior to Encounter   Medication Sig Dispense Refill    allopurinol (Zyloprim) 100 mg tablet Take 1 tablet (100 mg) by mouth once daily.      amLODIPine (Norvasc) 10 mg tablet Take 1 tablet (10 mg) by mouth once daily.      atorvastatin (Lipitor) 80 mg tablet Take 1 tablet (80 mg) by mouth once daily at bedtime.      calcium acetate (Phoslo) 667 mg capsule Take 3 capsules (2,000 mg) by mouth 3 times a day with meals. 270 capsule 5    carvedilol (Coreg) 25 mg tablet Take 1 tablet (25 mg) by mouth every 12 hours.      cholecalciferol (Vitamin D-3) 50 mcg (2,000 unit) capsule Take 1 capsule (50 mcg) by mouth early in the morning..      colchicine 0.6 mg tablet Take 1 tablet (0.6 mg) by mouth once daily as needed for muscle/joint pain (Acut gout flare x 1-2 days). 30 tablet 1    hydrALAZINE (Apresoline) 100 mg tablet Take 1 tablet (100 mg) by mouth 2 times a day. 60 tablet 6    levETIRAcetam (Keppra) 500 mg tablet Take 1 tablet (500 mg) by mouth 2 times a day. 60 tablet 0    lidocaine-prilocaine (Emla) 2.5-2.5 % cream Apply thick layer 2 hours prior to dialysis site, cover with plastic wrap. 30 g 11    losartan (Cozaar) 25 mg tablet Take 1 tablet (25 mg) by mouth once daily. 30 tablet 11      Results for orders placed or performed during the hospital encounter of 02/05/24 (from the past 24 hour(s))   CBC with Differential   Result Value Ref Range    WBC 18.7 (H) 4.4 - 11.3 x10*3/uL    nRBC 0.2 (H) 0.0 - 0.0 /100 WBCs    RBC 3.47 (L) 4.50 - 5.90 x10*6/uL    Hemoglobin 8.6 (L) 13.5 - 17.5 g/dL    Hematocrit 26.7 (L) 41.0 - 52.0 %    MCV 77 (L) 80 - 100 fL    MCH 24.8 (L) 26.0 - 34.0 pg    MCHC 32.2 32.0 - 36.0 " g/dL    RDW 18.9 (H) 11.5 - 14.5 %    Platelets 296 150 - 450 x10*3/uL    Neutrophils % 86.0 40.0 - 80.0 %    Immature Granulocytes %, Automated 1.3 (H) 0.0 - 0.9 %    Lymphocytes % 5.3 13.0 - 44.0 %    Monocytes % 6.1 2.0 - 10.0 %    Eosinophils % 0.9 0.0 - 6.0 %    Basophils % 0.4 0.0 - 2.0 %    Neutrophils Absolute 16.09 (H) 1.20 - 7.70 x10*3/uL    Immature Granulocytes Absolute, Automated 0.25 0.00 - 0.70 x10*3/uL    Lymphocytes Absolute 1.00 (L) 1.20 - 4.80 x10*3/uL    Monocytes Absolute 1.15 (H) 0.10 - 1.00 x10*3/uL    Eosinophils Absolute 0.17 0.00 - 0.70 x10*3/uL    Basophils Absolute 0.08 0.00 - 0.10 x10*3/uL   Comprehensive Metabolic Panel   Result Value Ref Range    Glucose 97 74 - 99 mg/dL    Sodium 138 136 - 145 mmol/L    Potassium 4.7 3.5 - 5.3 mmol/L    Chloride 103 98 - 107 mmol/L    Bicarbonate 5 (LL) 21 - 32 mmol/L    Anion Gap 35 (H) 10 - 20 mmol/L    Urea Nitrogen 156 (HH) 6 - 23 mg/dL    Creatinine >25.00 (H) 0.50 - 1.30 mg/dL    eGFR      Calcium 6.0 (L) 8.6 - 10.3 mg/dL    Albumin 3.6 3.4 - 5.0 g/dL    Alkaline Phosphatase 111 33 - 120 U/L    Total Protein 7.9 6.4 - 8.2 g/dL    AST 45 (H) 9 - 39 U/L    Bilirubin, Total 0.4 0.0 - 1.2 mg/dL    ALT 47 10 - 52 U/L   Brain Natriuretic Peptide   Result Value Ref Range     (H) 0 - 99 pg/mL   Troponin I, High Sensitivity   Result Value Ref Range    Troponin I, High Sensitivity 134 (HH) 0 - 20 ng/L   Protime-INR   Result Value Ref Range    Protime 14.6 (H) 9.8 - 12.8 seconds    INR 1.3 (H) 0.9 - 1.1   BLOOD GAS VENOUS FULL PANEL   Result Value Ref Range    POCT pH, Venous 7.08 (LL) 7.33 - 7.43 pH    POCT pCO2, Venous 18 (L) 41 - 51 mm Hg    POCT pO2, Venous 64 (H) 35 - 45 mm Hg    POCT SO2, Venous 84 (H) 45 - 75 %    POCT Oxy Hemoglobin, Venous 82.7 (H) 45.0 - 75.0 %    POCT Hematocrit Calculated, Venous 25.0 (L) 41.0 - 52.0 %    POCT Sodium, Venous 141 136 - 145 mmol/L    POCT Potassium, Venous 4.2 3.5 - 5.3 mmol/L    POCT Chloride, Venous 109  (H) 98 - 107 mmol/L    POCT Ionized Calicum, Venous 0.68 (LL) 1.10 - 1.33 mmol/L    POCT Glucose, Venous 91 74 - 99 mg/dL    POCT Lactate, Venous 1.2 0.4 - 2.0 mmol/L    POCT Base Excess, Venous -22.8 (L) -2.0 - 3.0 mmol/L    POCT HCO3 Calculated, Venous 5.3 (L) 22.0 - 26.0 mmol/L    POCT Hemoglobin, Venous 8.4 (L) 13.5 - 17.5 g/dL    POCT Anion Gap, Venous 31.0 (H) 10.0 - 25.0 mmol/L    Patient Temperature 37.0 degrees Celsius    FiO2 21 %   Lactate   Result Value Ref Range    Lactate 0.6 0.4 - 2.0 mmol/L      XR chest 1 view    Result Date: 2/5/2024  STUDY: Chest Radiograph;  2/5/2024 6:11 PM. INDICATION: Chest pain. COMPARISON: CXR 9/30/2023 ACCESSION NUMBER(S): [id number] ORDERING CLINICIAN: Get Felipe TECHNIQUE:  Frontal chest was obtained at 18:11 hours. FINDINGS: CARDIOMEDIASTINAL SILHOUETTE: Cardiomediastinal silhouette is normal in size and configuration.  LUNGS: Lungs are clear.  ABDOMEN: No remarkable upper abdominal findings.  BONES: No acute osseous changes.  There is a mild scoliosis in the thoracic spine convex to the right.    No acute cardiopulmonary disease. Signed by Ben Grewal MD       Assessment/Plan   Principal Problem:    Metabolic acidosis  2/2 Missed HD, compliance  Diarrhea, ? Infection   Chronic Anemia   Hx of Hypertension   Hx of seizure    Neuro:   -continue with keppra   -Q1 neuro check per ICU protocol  -CAM ICU score q-shift  -Sleep/wake cycle hygiene  -Delirium precaution     CV:   Hx  HLD. Hypertension   -continue home b/p medications   -Continuous cardiac monitoring and hourly vital signs    -Electrolytes daily monitoring     PULM:   -supplemental oxygen to keep spo2 > 92%     GI/FEN/Endo:  anemia   -NPO , ok with meds and sip of water   -Chronic Anemia baseline hgb low 7   -Daily electrolyte as replete as needed      Renal/:  ESRD  -Stat HD ordered and on going at bedside, Nephrology managing   -LUE Fistula   -Strict I&O  -Daily weights  -Renal Panel in am  -ICU  electrolyte replacement protocol- keep potassium >4, magnesium >2. Trend daily BMP, mag, phos.  -Avoid Nephrotoxic Agents, NSAIDs/ Renal dose medications     Heme/ID:     -SCDs  -pDVT- Heparin Subcutaneous   -ATB: broadly covered in the ED . I don't have source.  Urine ordered, BC is pending   Holding any atb for now    MSK/Integumentary:  Reposition, Pressure relieving measures     Lines: IV,                                                                                                                                                                Social/family/dispo: admit to ICU.    I spent 75 minutes in the professional and overall care of this patient.      Kevin Mansfield, APRN-CNP

## 2024-02-06 NOTE — ED TRIAGE NOTES
TRIAGE NOTE   I saw the patient as the Clinician in Triage and performed a brief history and physical exam, established acuity, and ordered appropriate tests to develop basic plan of care. Patient will be seen by an ALFREDO, resident and/or physician who will independently evaluate the patient. Please see subsequent provider notes for further details and disposition.     Brief HPI: In brief, Soren Howard is a 48 y.o. male that presents for shortness of breath.  hx of ESRD, seizures s/p partial session of dialysis today with worsening SOB. + tachypnea. Reports chronic hx of diarrhea.  States last full session on Friday.    Focused Physical exam:   Heart RRR  Lungs CTAB + tachypnea + kusmaul     Plan/MDM:   To main ED for labs and further work-up. Concern for metabolic acidosis and hyperkalemia given partial session  EKG w/out peaked t-waves    Please see subsequent provider note for further details and disposition

## 2024-02-06 NOTE — PROCEDURES
Seen in the intensive care unit on hemodialysis.  Briefly, BETSY STERN is a 48-year-old male with a past medical history of end-stage renal disease who is very poorly adherent to hemodialysis at [facility] Monday Wednesday Friday under my care. He has a history of longstanding hypertension, GI bleeding due to gastric AVMs status post ablation, cecal diverticulitis, hypocalcemia, had a seizure last October who presented with shortness of breath in the setting of missed dialysis.  In the ED, he has severe a anion gap acidosis with a bicarbonate of 5, VBG pH of 7.08, pCO2 18, bicarbonate of 5.  His BUN was 156, creatinine greater than 25, calcium of 6.  Chest x-ray was without acute cardiopulmonary process.  We were notified of his presence in the ED.  We requested a bicarbonate drip.  He was admitted to the intensive care unit.  Underwent urgent dialysis overnight.  He is dialyzing on F1 80 kidney x 3 hours, BFR//500 mL/minute, 4K bath, 3.5 calcium with a target UF set for 2 kg as tolerated.  I have ordered erythropoietin and he is on a calcium based binder.  We will need to put him back on his routine dialysis schedule prior to discharge. We discuss his high risk of death given his poor dialysis adherence. He does not show any emotion. We have had this discussion with him and his wife on multiple occasions. Will follow.

## 2024-02-06 NOTE — PROGRESS NOTES
Pharmacy Medication History Review    Soren Howard is a 48 y.o. male admitted for Metabolic acidosis. Pharmacy reviewed the patient's [id number] medications and allergies for accuracy.    The list below reflectives the updated PTA list. Please review each medication in order reconciliation for additional clarification and justification.  Prior to Admission Medications   Prescriptions Last Dose Informant Patient Reported? Taking?   allopurinol (Zyloprim) 100 mg tablet 2/5/2024  Yes No   Sig: Take 1 tablet (100 mg) by mouth once daily.   amLODIPine (Norvasc) 10 mg tablet 2/5/2024  Yes No   Sig: Take 1 tablet (10 mg) by mouth once daily.   atorvastatin (Lipitor) 80 mg tablet 2/4/2024  Yes No   Sig: Take 1 tablet (80 mg) by mouth once daily at bedtime.   calcium acetate (Phoslo) 667 mg capsule 2/5/2024  No No   Sig: Take 3 capsules (2,000 mg) by mouth 3 times a day with meals.   carvedilol (Coreg) 25 mg tablet 2/5/2024  Yes No   Sig: Take 1 tablet (25 mg) by mouth every 12 hours.   cholecalciferol (Vitamin D-3) 50 mcg (2,000 unit) capsule 2/5/2024  Yes No   Sig: Take 1 capsule (50 mcg) by mouth early in the morning..   colchicine 0.6 mg tablet 2/5/2024  No No   Sig: Take 1 tablet (0.6 mg) by mouth once daily as needed for muscle/joint pain (Acut gout flare x 1-2 days).   hydrALAZINE (Apresoline) 100 mg tablet 2/5/2024  No No   Sig: Take 1 tablet (100 mg) by mouth 2 times a day.   levETIRAcetam (Keppra) 500 mg tablet   No No   Sig: Take 1 tablet (500 mg) by mouth 2 times a day.   lidocaine-prilocaine (Emla) 2.5-2.5 % cream 2/6/2024  No No   Sig: Apply thick layer 2 hours prior to dialysis site, cover with plastic wrap.   losartan (Cozaar) 25 mg tablet 2/5/2024  No No   Sig: Take 1 tablet (25 mg) by mouth once daily.      Facility-Administered Medications: None      Allergies  Reviewed by Georgette Craven, EMT on 2/5/2024        Severity Reactions Comments    Piperacillin-tazobactam-dextrs Not Specified  Hives             Below are additional concerns with the patient's PTA list.  Patient verified all medications and doses.    Cece Noel CPhT

## 2024-02-06 NOTE — PROGRESS NOTES
.Report from Sending RN:    Report From: America Perrin RN   Recent Surgery of Procedure: No  Baseline Level of Consciousness (LOC): A&Ox3  Oxygen Use: No  Type: n/a  Diabetic: No  Last BP Med Given Day of Dialysis: BP meds to be given now.   Last Pain Med Given: At admission  Lab Tests to be Obtained with Dialysis: No  Blood Transfusion to be Given During Dialysis: No  Available IV Access: Yes  Medications to be Administered During Dialysis: No  Continuous IV Infusion Running: Yes  Restraints on Currently or in the Last 24 Hours: No  Hand-Off Communication: At pt bedside, CANDIDO Cross notified of hypertension, and SOB. Pt appears to be in need of dialysis urgently.

## 2024-02-06 NOTE — ED PROVIDER NOTES
"HPI   Chief Complaint   Patient presents with    Shortness of Breath     Pt presents with sob that started today while at dialysis. He also reports he has had diarrhea for the last month, but he states it has been getting \"worse and worse\". Pt denies chest pain. Denies n/v. Denies headache. Pt has no further complaints at this time.         This is a 48-year-old male who presents to the emergency department complaining of diarrhea.  The patient reports diarrhea for the past month.  It has not been bloody.  He does not have associated abdominal pain.  He denies fevers and chills.  He also reports feeling short of breath over the last couple of days.  He missed his last dialysis treatment but went today.  Due to his diarrhea, he had to cut his treatment short.  He had about 2 hours of treatment.    Past medical history: ESRD, seizures, hypertension                          Glidden Coma Scale Score: 15                  Patient History   Past Medical History:   Diagnosis Date    Angiodysplasia of stomach and duodenum without bleeding     Gastric AV malformation    COVID-19     COVID-19 virus infection    Diverticulosis of intestine, part unspecified, without perforation or abscess without bleeding     Diverticulosis    End stage renal disease (CMS/[facility]) 08/02/2021    ESRD (end stage renal disease)    Other hemorrhoids     Internal hemorrhoid    Personal history of diseases of the blood and blood-forming organs and certain disorders involving the immune mechanism     History of anemia    Personal history of other diseases of the circulatory system     History of hypertension    Personal history of other diseases of the musculoskeletal system and connective tissue     History of gout    Personal history of other endocrine, nutritional and metabolic disease     History of obesity    Personal history of other medical treatment     History of blood transfusion    Residual hemorrhoidal skin tags     External hemorrhoid    " Smoker     Ulcer of anus and rectum     Rectal ulcer    Unspecified osteoarthritis, unspecified site     Osteoarthritis     Past Surgical History:   Procedure Laterality Date    CT ABDOMEN PELVIS ANGIOGRAM W AND/OR WO IV CONTRAST  5/24/2023    CT ABDOMEN PELVIS ANGIOGRAM W AND/OR WO IV CONTRAST 5/24/2023 [facility] CT    IR VENOGRAM DIALYSIS  5/26/2023    IR VENOGRAM DIALYSIS 5/26/2023 [facility] ANGIO    OTHER SURGICAL HISTORY  01/18/2022    Dialysis tunneled catheter placement    OTHER SURGICAL HISTORY  01/19/2022    Arteriovenous fistula creation procedure     No family history on file.  Social History     Tobacco Use    Smoking status: Not on file    Smokeless tobacco: Not on file   Substance Use Topics    Alcohol use: Not on file    Drug use: Not on file       Physical Exam   ED Triage Vitals [02/05/24 1722]   Temperature Heart Rate Respirations BP   36.2 °C (97.2 °F) 89 20 111/81      Pulse Ox Temp Source Heart Rate Source Patient Position   100 % Oral -- --      BP Location FiO2 (%)     -- --       Physical Exam  Vitals and nursing note reviewed.   HENT:      Head: Normocephalic and atraumatic.      Nose: Nose normal.   Eyes:      Conjunctiva/sclera: Conjunctivae normal.   Cardiovascular:      Rate and Rhythm: Normal rate and regular rhythm.      Pulses: Normal pulses.      Heart sounds: Normal heart sounds.   Pulmonary:      Effort: Pulmonary effort is normal. Tachypnea present.      Breath sounds: Normal breath sounds.   Abdominal:      General: Bowel sounds are normal.      Palpations: Abdomen is soft.   Musculoskeletal:         General: Normal range of motion.      Cervical back: Normal range of motion and neck supple.   Skin:     Findings: No rash.   Neurological:      General: No focal deficit present.      Mental Status: He is alert and oriented to person, place, and time.   Psychiatric:         Mood and Affect: Mood normal.         ED Course & MDM   Diagnoses as of 02/05/24 2219   Metabolic acidosis   ESRD (end  stage renal disease) (CMS/[facility])       Medical Decision Making  Differential diagnosis: I have considered the following conditions in my assessment of this patient's condition:  COPD, asthma, CHF, DKA, ACS, pneumonia, pneumothorax, pulmonary embolus, sepsis, bronchitis, foreign body aspiration.    This is a 48-year-old male who presents to the emergency department complaining of diarrhea and shortness of breath.  The patient is very tachypneic.  He was evaluated with chest x-ray and labs.  Labs show a severe metabolic acidosis.  Nephrology, Dr. Charles, was called.  Patient was given bicarb push and drip initiated.  Spoke to the ICU attending, Dr. Hope, who accepted the patient for admission.  Dr. Charles is the patient's primary nephrologist and knows him well.  He recommends bicarb drip.    Amount and/or Complexity of Data Reviewed  ECG/medicine tests: independent interpretation performed.     Details: Normal sinus rhythm, heart rate 93, poor R wave progression.        Procedure  Critical Care    Performed by: Grant Lujan MD  Authorized by: Grant Lujan MD    Critical care provider statement:     Critical care time (minutes):  35    Critical care time was exclusive of:  Separately billable procedures and treating other patients    Critical care was necessary to treat or prevent imminent or life-threatening deterioration of the following conditions:  Metabolic crisis    Critical care was time spent personally by me on the following activities:  Development of treatment plan with patient or surrogate, discussions with consultants, examination of patient, review of old charts, re-evaluation of patient's condition, ordering and review of laboratory studies and ordering and performing treatments and interventions    Care discussed with: admitting provider         Grant Lujan MD  02/05/24 4853

## 2024-02-07 LAB
ALBUMIN SERPL BCP-MCNC: 3.5 G/DL (ref 3.4–5)
ANION GAP SERPL CALC-SCNC: 22 MMOL/L (ref 10–20)
ATRIAL RATE: 93 BPM
BUN SERPL-MCNC: 49 MG/DL (ref 6–23)
CALCIUM SERPL-MCNC: 7.2 MG/DL (ref 8.6–10.3)
CHLORIDE SERPL-SCNC: 98 MMOL/L (ref 98–107)
CO2 SERPL-SCNC: 24 MMOL/L (ref 21–32)
CREAT SERPL-MCNC: 15.22 MG/DL (ref 0.5–1.3)
EGFRCR SERPLBLD CKD-EPI 2021: 4 ML/MIN/1.73M*2
ERYTHROCYTE [DISTWIDTH] IN BLOOD BY AUTOMATED COUNT: 18 % (ref 11.5–14.5)
GLUCOSE SERPL-MCNC: 79 MG/DL (ref 74–99)
HCT VFR BLD AUTO: 28.9 % (ref 41–52)
HGB BLD-MCNC: 9.4 G/DL (ref 13.5–17.5)
MCH RBC QN AUTO: 25.3 PG (ref 26–34)
MCHC RBC AUTO-ENTMCNC: 32.5 G/DL (ref 32–36)
MCV RBC AUTO: 78 FL (ref 80–100)
NRBC BLD-RTO: 0.5 /100 WBCS (ref 0–0)
P AXIS: 53 DEGREES
P OFFSET: 161 MS
P ONSET: 116 MS
PHOSPHATE SERPL-MCNC: 6.5 MG/DL (ref 2.5–4.9)
PLATELET # BLD AUTO: 266 X10*3/UL (ref 150–450)
POTASSIUM SERPL-SCNC: 2.8 MMOL/L (ref 3.5–5.3)
PR INTERVAL: 208 MS
Q ONSET: 220 MS
QRS COUNT: 16 BEATS
QRS DURATION: 92 MS
QT INTERVAL: 540 MS
QTC CALCULATION(BAZETT): 671 MS
QTC FREDERICIA: 625 MS
R AXIS: -23 DEGREES
RBC # BLD AUTO: 3.71 X10*6/UL (ref 4.5–5.9)
SODIUM SERPL-SCNC: 141 MMOL/L (ref 136–145)
T AXIS: 71 DEGREES
T OFFSET: 490 MS
VENTRICULAR RATE: 93 BPM
WBC # BLD AUTO: 11.8 X10*3/UL (ref 4.4–11.3)

## 2024-02-07 PROCEDURE — 6350000001 HC RX 635 EPOETIN >10,000 UNITS: Mod: JW | Performed by: EMERGENCY MEDICINE

## 2024-02-07 PROCEDURE — 2500000001 HC RX 250 WO HCPCS SELF ADMINISTERED DRUGS (ALT 637 FOR MEDICARE OP): Performed by: INTERNAL MEDICINE

## 2024-02-07 PROCEDURE — 2500000004 HC RX 250 GENERAL PHARMACY W/ HCPCS (ALT 636 FOR OP/ED): Performed by: EMERGENCY MEDICINE

## 2024-02-07 PROCEDURE — 80069 RENAL FUNCTION PANEL: CPT | Performed by: NURSE PRACTITIONER

## 2024-02-07 PROCEDURE — 2500000001 HC RX 250 WO HCPCS SELF ADMINISTERED DRUGS (ALT 637 FOR MEDICARE OP): Performed by: EMERGENCY MEDICINE

## 2024-02-07 PROCEDURE — 85027 COMPLETE CBC AUTOMATED: CPT | Performed by: NURSE PRACTITIONER

## 2024-02-07 PROCEDURE — 36415 COLL VENOUS BLD VENIPUNCTURE: CPT | Performed by: NURSE PRACTITIONER

## 2024-02-07 PROCEDURE — 2500000001 HC RX 250 WO HCPCS SELF ADMINISTERED DRUGS (ALT 637 FOR MEDICARE OP): Performed by: STUDENT IN AN ORGANIZED HEALTH CARE EDUCATION/TRAINING PROGRAM

## 2024-02-07 PROCEDURE — 1200000002 HC GENERAL ROOM WITH TELEMETRY DAILY

## 2024-02-07 RX ORDER — LOPERAMIDE HYDROCHLORIDE 2 MG/1
2 CAPSULE ORAL 4 TIMES DAILY PRN
Status: DISCONTINUED | OUTPATIENT
Start: 2024-02-07 | End: 2024-02-08 | Stop reason: HOSPADM

## 2024-02-07 RX ORDER — POTASSIUM CHLORIDE 1.5 G/1.58G
20 POWDER, FOR SOLUTION ORAL ONCE
Status: COMPLETED | OUTPATIENT
Start: 2024-02-07 | End: 2024-02-07

## 2024-02-07 RX ADMIN — HEPARIN SODIUM 5000 UNITS: 5000 INJECTION INTRAVENOUS; SUBCUTANEOUS at 02:39

## 2024-02-07 RX ADMIN — CARVEDILOL 25 MG: 25 TABLET, FILM COATED ORAL at 08:19

## 2024-02-07 RX ADMIN — HEPARIN SODIUM 5000 UNITS: 5000 INJECTION INTRAVENOUS; SUBCUTANEOUS at 10:01

## 2024-02-07 RX ADMIN — HEPARIN SODIUM 5000 UNITS: 5000 INJECTION INTRAVENOUS; SUBCUTANEOUS at 18:13

## 2024-02-07 RX ADMIN — EPOETIN ALFA-EPBX 8000 UNITS: 10000 INJECTION, SOLUTION INTRAVENOUS; SUBCUTANEOUS at 18:21

## 2024-02-07 RX ADMIN — CALCIUM ACETATE 2000 MG: 667 CAPSULE ORAL at 08:19

## 2024-02-07 RX ADMIN — CARVEDILOL 25 MG: 25 TABLET, FILM COATED ORAL at 20:02

## 2024-02-07 RX ADMIN — LEVETIRACETAM 500 MG: 500 TABLET, FILM COATED ORAL at 10:01

## 2024-02-07 RX ADMIN — ALLOPURINOL 100 MG: 100 TABLET ORAL at 08:19

## 2024-02-07 RX ADMIN — ATORVASTATIN CALCIUM 80 MG: 80 TABLET, FILM COATED ORAL at 20:02

## 2024-02-07 RX ADMIN — POTASSIUM CHLORIDE 20 MEQ: 1.5 POWDER, FOR SOLUTION ORAL at 06:50

## 2024-02-07 RX ADMIN — AMLODIPINE BESYLATE 10 MG: 10 TABLET ORAL at 08:19

## 2024-02-07 RX ADMIN — CALCIUM ACETATE 2000 MG: 667 CAPSULE ORAL at 13:17

## 2024-02-07 RX ADMIN — LEVETIRACETAM 500 MG: 500 TABLET, FILM COATED ORAL at 22:15

## 2024-02-07 RX ADMIN — CALCIUM ACETATE 2000 MG: 667 CAPSULE ORAL at 18:13

## 2024-02-07 ASSESSMENT — PAIN - FUNCTIONAL ASSESSMENT
PAIN_FUNCTIONAL_ASSESSMENT: 0-10

## 2024-02-07 ASSESSMENT — COGNITIVE AND FUNCTIONAL STATUS - GENERAL
DAILY ACTIVITIY SCORE: 24
DAILY ACTIVITIY SCORE: 24
MOBILITY SCORE: 24
MOBILITY SCORE: 24

## 2024-02-07 ASSESSMENT — PAIN SCALES - GENERAL
PAINLEVEL_OUTOF10: 0 - NO PAIN

## 2024-02-07 NOTE — CARE PLAN
The patient's goals for the shift include  no sob    The clinical goals for the shift include pt will remain hemodynamically stable throughout this shift      Problem: Safety  Goal: Patient will be injury free during hospitalization  Outcome: Progressing     Problem: Daily Care  Goal: Daily care needs are met  Outcome: Progressing     Problem: Psychosocial Needs  Goal: Demonstrates ability to cope with hospitalization/illness  Outcome: Progressing     Problem: Respiratory  Goal: Minimize anxiety/maximize coping throughout shift  Outcome: Progressing

## 2024-02-07 NOTE — PROGRESS NOTES
Soren Howard is a 48 y.o. male on day 2 of admission presenting with Metabolic acidosis.    SOREN Lee is a 48-year-old male with a past medical history of end-stage renal disease who is very poorly adherent to hemodialysis at [facility] Monday Wednesday Friday under my care. He has a history of longstanding hypertension, GI bleeding due to gastric AVMs status post ablation, cecal diverticulitis, hypocalcemia, had a seizure last October who presented with shortness of breath in the setting of missed dialysis.  In the ED, he has severe a anion gap acidosis with a bicarbonate of 5, VBG pH of 7.08, pCO2 18, bicarbonate of 5.  His BUN was 156, creatinine greater than 25, calcium of 6.  Chest x-ray was without acute cardiopulmonary process.  We were notified of his presence in the ED.  We requested a bicarbonate drip.  He was admitted to the intensive care unit.  Underwent urgent dialysis overnight.     We dialyzed him again yesterday uneventfully.   Transferred out of the ICU to the [facility].   TESSA. He does not offer specific complaints.        Objective          Vitals 24HR  Heart Rate:  []   Temp:  [36.3 °C (97.3 °F)-36.9 °C (98.4 °F)]   Resp:  [16-18]   BP: (100-136)/(68-87)   SpO2:  [96 %-100 %]     Intake/Output last 3 Shifts:    Intake/Output Summary (Last 24 hours) at 2/7/2024 1245  Last data filed at 2/6/2024 1250  Gross per 24 hour   Intake 2800 ml   Output 2000 ml   Net 800 ml       Physical Exam  Constitutional: Well developed, awake/alert/oriented  x3, conversational dyspnea   ENMT: mucous membranes moist   Head/Neck: Neck supple  No JVD   Respiratory/Thorax: Diminished, good chest expansion,  thorax symmetric   Cardiovascular: Regular, rate and rhythm, no murmurs, normal S 1 and S 2   Gastrointestinal: soft, non-tender, no rebound tenderness  or guarding, no organomegaly, +BS   Musculoskeletal: ROM intact, no joint swelling   Extremities: no LE edema   Neurological: alert and oriented  x3  no gross focal deficits   Skin: Warm and dry, no lesions, no rashes     Scheduled Medications  allopurinol, 100 mg, oral, Daily  alteplase, 2 mg, intra-catheter, Daily  alteplase, 2 mg, intra-catheter, Daily  alteplase, 2 mg, intra-catheter, Daily  alteplase, 2 mg, intra-catheter, Daily  amLODIPine, 10 mg, oral, Daily  atorvastatin, 80 mg, oral, Nightly  calcium acetate, 2,000 mg, oral, TID with meals  carvedilol, 25 mg, oral, q12h  cholecalciferol, 2,000 Units, oral, Daily  epoetin tavia or biosimilar, 100 Units/kg, subcutaneous, Once per day on Mon Wed Fri  heparin (porcine), 5,000 Units, subcutaneous, q8h  [Held by provider] hydrALAZINE, 100 mg, oral, BID  levETIRAcetam, 500 mg, oral, q12h  [Held by provider] losartan, 25 mg, oral, Daily      Continuous medications       PRN medications: loperamide, oxygen     Relevant Results  Results from last 7 days   Lab Units 02/07/24  0512 02/06/24 0458 02/05/24 2053   WBC AUTO x10*3/uL 11.8* 16.0* 18.7*   HEMOGLOBIN g/dL 9.4* 8.7* 8.6*   HEMATOCRIT % 28.9* 25.3* 26.7*   PLATELETS AUTO x10*3/uL 266 267 296   NEUTROS PCT AUTO %  --   --  86.0   LYMPHS PCT AUTO %  --   --  5.3   MONOS PCT AUTO %  --   --  6.1   EOS PCT AUTO %  --   --  0.9     Results from last 7 days   Lab Units 02/07/24  0512 02/06/24 0458 02/05/24 2053   SODIUM mmol/L 141 139 138   POTASSIUM mmol/L 2.8* 2.5* 4.7   CHLORIDE mmol/L 98 100 103   CO2 mmol/L 24 14* 5*   BUN mg/dL 49* 94* 156*   CREATININE mg/dL 15.22* 22.85* >25.00*   GLUCOSE mg/dL 79 85 97   CALCIUM mg/dL 7.2* 6.7* 6.0*       XR chest 1 view   Final Result   No acute cardiopulmonary disease.   Signed by Ben Grewal MD               Assessment/BETSY Dalton is a 48-year-old male with a past medical history of end-stage renal disease who is very poorly adherent to hemodialysis at [facility] Monday Wednesday Friday under my care. He has a history of longstanding hypertension, GI bleeding due to gastric AVMs status post  ablation, cecal diverticulitis, hypocalcemia, had a seizure last October who presented with shortness of breath in the setting of missed dialysis.  In the ED, he has severe a anion gap acidosis with a bicarbonate of 5, VBG pH of 7.08, pCO2 18, bicarbonate of 5.  His BUN was 156, creatinine greater than 25, calcium of 6.  Chest x-ray was without acute cardiopulmonary process.  We were notified of his presence in the ED.  We requested a bicarbonate drip.  He was admitted to the intensive care unit.  Underwent urgent dialysis overnight when he came in. We dialyzed him again yesterday. Acidemia has resolved. He is transferred out of the ICU.  We had an extensive discussion again regarding his compliance with dialysis. He is aware of his risk of death. I'm hoping that he becomes more adherent. He c/o ongoing diarrhea with no improvement with imodium. I will refer his to GI outpatient.  Dialysis again tomorrow then back on schedule Friday at his outpatient dialysis unit. I have ordered erythropoietin and he is on a calcium based binder.  He was given gentle potassium replacement. Will follow.       Principal Problem:    Metabolic acidosis         I spent 45 minutes in the professional and overall care of this patient.      Cosmo Charles, DO

## 2024-02-07 NOTE — PROGRESS NOTES
PROGRESS NOTE - INTERNAL MEDICINE     PATIENT NAME:  Soren Howard    MRN:  [id number]  SERVICE DATE:  2/7/2024       ADMITTING PHYSICIAN:  Darek Hope MD    ASSESSMENT AND PLAN    Principal Problem:    Metabolic acidosis    Hypertension  Chronic diarrhea  Anemia of chronic disease  ESRD on hemodialysis, with poor compliance with dialysis, has LUE AVF  Seizure disorder, on Keppra  Severe acute metabolic acidosis, improving with dialysis    Plan:  Continue current medications.  Counseled on compliance with dialysis  Continue Imodium as needed for chronic diarrhea  Monitor for bleeding  Seizure precautions  Plan DC home tomorrow after dialysis.  D/W Dr. Charles        INTERVAL HISTORY OF PRESENT ILLNESS:  4BMs last night with solid and liq parts. No abdo pain. No chest pain/sob. No n/v/d. Oral intake fair. Feeling better. No fever/chills. acute events from last 24 hrs reviewed.    Pertinent ROS:  No abdominal pain / No Bleeding / No rashes    Discussed with nursing and case management team and the specialists involved in this patient's care. Reviewed the EMR and documentation from other care-givers.      OBJECTIVE  PHYSICAL EXAM:     GENERAL: awake, alert, Ox3, cooperative resting comfortably  SKIN: Skin turgor normal. No rashes  HEENT: EOMI, no epistaxis, Moist mucosa.  LUNGS: Vesicular breath sounds, with no wheeze, no crepitations  CARDIAC: REGULAR. S1 and S2; no rubs, no murmur  ABDOMEN: Abdomen soft, non-tender. +BS.  EXTREMITIES: No edema, Good capillary refill.   NEURO: Insight GOOD. No invol movements. Gait not assessed  MUSCULOSKELETAL: No acute inflammation               2/6/2024    12:00 PM 2/6/2024    12:50 PM 2/6/2024     1:00 PM 2/6/2024     5:03 PM 2/6/2024    10:15 PM 2/7/2024    12:00 AM 2/7/2024     4:07 AM   Vitals   Systolic 97  121 136 107  100   Diastolic 70  74 87 68  69   Heart Rate 100  92 97 101 101 94   Temp  36.5 °C (97.7 °F)  36.6 °C (97.9 °F) 36.6 °C (97.8 °F)  36.9 °C (98.4 °F)   Resp  22  16 16 18  18     Body mass index is 29.55 kg/m².    Intake/Output Summary (Last 24 hours) at 2/7/2024 0831  Last data filed at 2/6/2024 1250  Gross per 24 hour   Intake 3400 ml   Output 2000 ml   Net 1400 ml           Current Facility-Administered Medications:     allopurinol (Zyloprim) tablet 100 mg, 100 mg, oral, Daily, Ivan Posadas MD, 100 mg at 02/07/24 0819    alteplase (Cathflo Activase) injection 2 mg, 2 mg, intra-catheter, Daily, Ivan Posadas MD    alteplase (Cathflo Activase) injection 2 mg, 2 mg, intra-catheter, Daily, Ivan Posadas MD    alteplase (Cathflo Activase) injection 2 mg, 2 mg, intra-catheter, Daily, Ivan Posadas MD    alteplase (Cathflo Activase) injection 2 mg, 2 mg, intra-catheter, Daily, Ivan Posadas MD    amLODIPine (Norvasc) tablet 10 mg, 10 mg, oral, Daily, Ivan Posadas MD, 10 mg at 02/07/24 0819    atorvastatin (Lipitor) tablet 80 mg, 80 mg, oral, Nightly, Ivan Posadas MD, 80 mg at 02/06/24 2220    calcium acetate (Phoslo) capsule 2,000 mg, 2,000 mg, oral, TID with meals, Ivan Posadas MD, 2,000 mg at 02/07/24 0819    carvedilol (Coreg) tablet 25 mg, 25 mg, oral, q12h, Ivan Posadas MD, 25 mg at 02/07/24 0819    cholecalciferol (Vitamin D-3) tablet 2,000 Units, 2,000 Units, oral, Daily, Ivan Posadas MD, 2,000 Units at 02/06/24 0606    colchicine tablet 0.6 mg, 0.6 mg, oral, Daily PRN, Ivan Posadas MD    epoetin tavia-epbx (Retacrit) injection 8,000 Units, 100 Units/kg, subcutaneous, Once per day on Mon Wed Fri, Ivan Posadas MD    heparin (porcine) injection 5,000 Units, 5,000 Units, subcutaneous, q8h, Ivan Posadas MD, 5,000 Units at 02/07/24 0239    [Held by provider] hydrALAZINE (Apresoline) tablet 100 mg, 100 mg, oral, BID, Ivan Posadas MD, 100 mg at 02/06/24 0918    levETIRAcetam (Keppra) tablet 500 mg, 500 mg, oral, q12h, Phillip Ortiz MD, 500 mg at 02/06/24 2220    [Held by provider] losartan (Cozaar) tablet 25 mg, 25 mg, oral, Daily, Ivan Posadas MD    oxygen (O2)  "therapy, , inhalation, Continuous PRN - O2/gases, Ivan Posadas MD    DATA:   Diagnostic tests reviewed for today's visit:    Most recent labs  Results from last 7 days   Lab Units 02/07/24 0512 02/06/24 0458 02/05/24 2053   WBC AUTO x10*3/uL 11.8* 16.0* 18.7*   HEMOGLOBIN g/dL 9.4* 8.7* 8.6*   HEMATOCRIT % 28.9* 25.3* 26.7*   PLATELETS AUTO x10*3/uL 266 267 296     Results from last 7 days   Lab Units 02/07/24 0512 02/06/24 0458 02/05/24 2053   SODIUM mmol/L 141 139 138   POTASSIUM mmol/L 2.8* 2.5* 4.7   CHLORIDE mmol/L 98 100 103   CO2 mmol/L 24 14* 5*   BUN mg/dL 49* 94* 156*   CREATININE mg/dL 15.22* 22.85* >25.00*   CALCIUM mg/dL 7.2* 6.7* 6.0*   PROTEIN TOTAL g/dL  --   --  7.9   BILIRUBIN TOTAL mg/dL  --   --  0.4   ALK PHOS U/L  --   --  111   ALT U/L  --   --  47   AST U/L  --   --  45*   GLUCOSE mg/dL 79 85 97             No results found for: \"TROPONINT\"         XR chest 1 view   Final Result   No acute cardiopulmonary disease.   Signed by Ben Grewal MD            SIGNATURE: Shannan Noriega MD PATIENT NAME: Soren Howard   DATE: 2/7/2024 MRN: [id number]   TIME: 8:31 AM        "

## 2024-02-07 NOTE — PROGRESS NOTES
Soren Howard is a 48 y.o. male on day 2 of admission presenting with Metabolic acidosis.       Patient missed dialysis and required emergent dialysis here. Patient is well known to Dr. Gonzalez, renal, he follows him as outpatient and per note patient is not very compliant with his dialysis. Patient is transferring out of ICU to med surg floor.

## 2024-02-08 ENCOUNTER — APPOINTMENT (OUTPATIENT)
Dept: CARDIOLOGY | Facility: HOSPITAL | Age: 49
End: 2024-02-08
Payer: COMMERCIAL

## 2024-02-08 ENCOUNTER — APPOINTMENT (OUTPATIENT)
Dept: DIALYSIS | Facility: HOSPITAL | Age: 49
End: 2024-02-08
Payer: COMMERCIAL

## 2024-02-08 VITALS
HEART RATE: 89 BPM | HEIGHT: 67 IN | RESPIRATION RATE: 16 BRPM | BODY MASS INDEX: 29.38 KG/M2 | OXYGEN SATURATION: 98 % | WEIGHT: 187.17 LBS | TEMPERATURE: 98 F | SYSTOLIC BLOOD PRESSURE: 119 MMHG | DIASTOLIC BLOOD PRESSURE: 72 MMHG

## 2024-02-08 LAB
ALBUMIN SERPL BCP-MCNC: 3.3 G/DL (ref 3.4–5)
ANION GAP SERPL CALC-SCNC: 21 MMOL/L (ref 10–20)
BUN SERPL-MCNC: 60 MG/DL (ref 6–23)
CALCIUM SERPL-MCNC: 6.8 MG/DL (ref 8.6–10.3)
CHLORIDE SERPL-SCNC: 99 MMOL/L (ref 98–107)
CO2 SERPL-SCNC: 23 MMOL/L (ref 21–32)
CREAT SERPL-MCNC: 17.94 MG/DL (ref 0.5–1.3)
EGFRCR SERPLBLD CKD-EPI 2021: 3 ML/MIN/1.73M*2
ERYTHROCYTE [DISTWIDTH] IN BLOOD BY AUTOMATED COUNT: 17.9 % (ref 11.5–14.5)
GLUCOSE BLD MANUAL STRIP-MCNC: 313 MG/DL (ref 74–99)
GLUCOSE BLD MANUAL STRIP-MCNC: 73 MG/DL (ref 74–99)
GLUCOSE SERPL-MCNC: 87 MG/DL (ref 74–99)
HCT VFR BLD AUTO: 27.9 % (ref 41–52)
HGB BLD-MCNC: 8.7 G/DL (ref 13.5–17.5)
LEVETIRACETAM SERPL-MCNC: 27 UG/ML (ref 10–40)
MAGNESIUM SERPL-MCNC: 2.2 MG/DL (ref 1.6–2.4)
MCH RBC QN AUTO: 24.6 PG (ref 26–34)
MCHC RBC AUTO-ENTMCNC: 31.2 G/DL (ref 32–36)
MCV RBC AUTO: 79 FL (ref 80–100)
NRBC BLD-RTO: 0.5 /100 WBCS (ref 0–0)
PHOSPHATE SERPL-MCNC: 6.2 MG/DL (ref 2.5–4.9)
PLATELET # BLD AUTO: 262 X10*3/UL (ref 150–450)
POTASSIUM SERPL-SCNC: 2.9 MMOL/L (ref 3.5–5.3)
RBC # BLD AUTO: 3.53 X10*6/UL (ref 4.5–5.9)
SODIUM SERPL-SCNC: 140 MMOL/L (ref 136–145)
WBC # BLD AUTO: 12.8 X10*3/UL (ref 4.4–11.3)

## 2024-02-08 PROCEDURE — 93005 ELECTROCARDIOGRAM TRACING: CPT

## 2024-02-08 PROCEDURE — 82947 ASSAY GLUCOSE BLOOD QUANT: CPT

## 2024-02-08 PROCEDURE — 2500000001 HC RX 250 WO HCPCS SELF ADMINISTERED DRUGS (ALT 637 FOR MEDICARE OP): Performed by: EMERGENCY MEDICINE

## 2024-02-08 PROCEDURE — 8010000001 HC DIALYSIS - HEMODIALYSIS PER DAY

## 2024-02-08 PROCEDURE — 99221 1ST HOSP IP/OBS SF/LOW 40: CPT | Performed by: PSYCHIATRY & NEUROLOGY

## 2024-02-08 PROCEDURE — 36415 COLL VENOUS BLD VENIPUNCTURE: CPT | Performed by: NURSE PRACTITIONER

## 2024-02-08 PROCEDURE — 2500000004 HC RX 250 GENERAL PHARMACY W/ HCPCS (ALT 636 FOR OP/ED): Performed by: NURSE PRACTITIONER

## 2024-02-08 PROCEDURE — 80177 DRUG SCRN QUAN LEVETIRACETAM: CPT | Mod: AHULAB | Performed by: INTERNAL MEDICINE

## 2024-02-08 PROCEDURE — 83735 ASSAY OF MAGNESIUM: CPT | Performed by: NURSE PRACTITIONER

## 2024-02-08 PROCEDURE — 36415 COLL VENOUS BLD VENIPUNCTURE: CPT | Performed by: INTERNAL MEDICINE

## 2024-02-08 PROCEDURE — 80069 RENAL FUNCTION PANEL: CPT | Performed by: NURSE PRACTITIONER

## 2024-02-08 PROCEDURE — 2500000004 HC RX 250 GENERAL PHARMACY W/ HCPCS (ALT 636 FOR OP/ED): Performed by: EMERGENCY MEDICINE

## 2024-02-08 PROCEDURE — 2500000001 HC RX 250 WO HCPCS SELF ADMINISTERED DRUGS (ALT 637 FOR MEDICARE OP): Performed by: STUDENT IN AN ORGANIZED HEALTH CARE EDUCATION/TRAINING PROGRAM

## 2024-02-08 PROCEDURE — 99232 SBSQ HOSP IP/OBS MODERATE 35: CPT | Performed by: NURSE PRACTITIONER

## 2024-02-08 PROCEDURE — 85027 COMPLETE CBC AUTOMATED: CPT | Performed by: NURSE PRACTITIONER

## 2024-02-08 PROCEDURE — 2500000001 HC RX 250 WO HCPCS SELF ADMINISTERED DRUGS (ALT 637 FOR MEDICARE OP): Performed by: INTERNAL MEDICINE

## 2024-02-08 RX ORDER — DEXTROSE 50 % IN WATER (D50W) INTRAVENOUS SYRINGE
25 ONCE
Status: DISCONTINUED | OUTPATIENT
Start: 2024-02-08 | End: 2024-02-08 | Stop reason: HOSPADM

## 2024-02-08 RX ORDER — CALCIUM CARBONATE 200(500)MG
1000 TABLET,CHEWABLE ORAL 3 TIMES DAILY
Qty: 180 TABLET | Refills: 0 | Status: SHIPPED | OUTPATIENT
Start: 2024-02-08 | End: 2024-03-09

## 2024-02-08 RX ORDER — LEVETIRACETAM 500 MG/1
500 TABLET ORAL DAILY
Status: DISCONTINUED | OUTPATIENT
Start: 2024-02-09 | End: 2024-02-08 | Stop reason: HOSPADM

## 2024-02-08 RX ORDER — LEVETIRACETAM 500 MG/1
500 TABLET ORAL DAILY
Qty: 60 TABLET | Refills: 0 | Status: SHIPPED | OUTPATIENT
Start: 2024-02-08 | End: 2024-05-17 | Stop reason: SDUPTHER

## 2024-02-08 RX ORDER — CALCIUM CARBONATE 200(500)MG
1000 TABLET,CHEWABLE ORAL 3 TIMES DAILY
Status: DISCONTINUED | OUTPATIENT
Start: 2024-02-08 | End: 2024-02-08 | Stop reason: HOSPADM

## 2024-02-08 RX ORDER — POTASSIUM CHLORIDE 20 MEQ/1
20 TABLET, EXTENDED RELEASE ORAL ONCE
Status: COMPLETED | OUTPATIENT
Start: 2024-02-08 | End: 2024-02-08

## 2024-02-08 RX ADMIN — HEPARIN SODIUM 5000 UNITS: 5000 INJECTION INTRAVENOUS; SUBCUTANEOUS at 11:03

## 2024-02-08 RX ADMIN — ALLOPURINOL 100 MG: 100 TABLET ORAL at 11:01

## 2024-02-08 RX ADMIN — CALCIUM CARBONATE (ANTACID) CHEW TAB 500 MG 1000 MG: 500 CHEW TAB at 15:11

## 2024-02-08 RX ADMIN — POTASSIUM CHLORIDE 20 MEQ: 1500 TABLET, EXTENDED RELEASE ORAL at 11:00

## 2024-02-08 RX ADMIN — CALCIUM ACETATE 2000 MG: 667 CAPSULE ORAL at 11:02

## 2024-02-08 RX ADMIN — Medication 2000 UNITS: at 11:01

## 2024-02-08 RX ADMIN — LEVETIRACETAM 500 MG: 500 TABLET, FILM COATED ORAL at 11:02

## 2024-02-08 ASSESSMENT — PAIN SCALES - GENERAL: PAINLEVEL_OUTOF10: 0 - NO PAIN

## 2024-02-08 ASSESSMENT — PAIN - FUNCTIONAL ASSESSMENT: PAIN_FUNCTIONAL_ASSESSMENT: 0-10

## 2024-02-08 NOTE — DISCHARGE INSTRUCTIONS
Start taking Keppra 500 mg once daily.  Take an extra tablet on the days that you have dialysis.  Taking calcium 3 times a day.  No driving

## 2024-02-08 NOTE — PROGRESS NOTES
PROGRESS NOTE - INTERNAL MEDICINE     PATIENT NAME:  Soren Howard    MRN:  [id number]  SERVICE DATE:  2/8/2024       ADMITTING PHYSICIAN:  Darek Hope MD    ASSESSMENT AND PLAN    Principal Problem:    Metabolic acidosis    Hypertension  Chronic diarrhea  Anemia of chronic disease  ESRD on hemodialysis, with poor compliance with dialysis, has LUE AVF  Seizure disorder, on Keppra in hosp. ?compliance at home   Severe acute metabolic acidosis, improving with dialysis     Plan:  Continue current medications.  Counseled on compliance with dialysis  Continue Imodium as needed for chronic diarrhea  Monitor for bleeding  Seizure precautions  D/W Dr. Charles  Neuro consult for seizure today         INTERVAL HISTORY OF PRESENT ILLNESS:  +seiz during HD. +ongoing diarrhea a few times per day, less than previously. No abdo pain. No chest pain/sob. No n/v/d. Oral intake fair. Feeling better. No fever/chills.      Pertinent ROS:  No abdominal pain / No Bleeding / No rashes     Reviewed the EMR and documentation from other care-givers.        OBJECTIVE  PHYSICAL EXAM:      GENERAL: AAOx3, cooperative resting comfortably  SKIN: Skin turgor normal. No rashes  HEENT: EOMI, no epistaxis, Moist mucosa.  LUNGS: Vesicular breath sounds, with no wheeze, no crepitations  CARDIAC: REGULAR. S1 and S2; no rubs, no murmur. Left arm fistula with dry gauze in place.   ABDOMEN: Abdomen soft, non-tender. +BS.  EXTREMITIES: No edema, Good capillary refill.   NEURO: [facility] GOOD. No invol movements. Gait not assessed  MUSCULOSKELETAL: No acute inflammation            2/8/2024     1:17 AM 2/8/2024     3:38 AM 2/8/2024     6:00 AM 2/8/2024     7:58 AM 2/8/2024     8:30 AM 2/8/2024    10:20 AM 2/8/2024    10:58 AM   Vitals   Systolic 91 94  103   119   Diastolic 54 56  66   72   Heart Rate 78 71  73 72 79 89   Temp 36.9 °C (98.4 °F) 36.6 °C (97.9 °F)  36 °C (96.8 °F) 36.2 °C (97.2 °F) 36.2 °C (97.2 °F) 36.7 °C (98 °F)   Resp    16   16   Weight  (lb)   187.17       BMI   29.31 kg/m2       BSA (m2)   2 m2         Body mass index is 29.31 kg/m².    Intake/Output Summary (Last 24 hours) at 2/8/2024 1341  Last data filed at 2/8/2024 1020  Gross per 24 hour   Intake 1600 ml   Output 1900 ml   Net -300 ml             Current Facility-Administered Medications:     allopurinol (Zyloprim) tablet 100 mg, 100 mg, oral, Daily, Ivan Posadas MD, 100 mg at 02/08/24 1101    alteplase (Cathflo Activase) injection 2 mg, 2 mg, intra-catheter, Daily, Ivan Posadas MD    alteplase (Cathflo Activase) injection 2 mg, 2 mg, intra-catheter, Daily, Ivan Posadas MD    alteplase (Cathflo Activase) injection 2 mg, 2 mg, intra-catheter, Daily, Ivan Posadas MD    alteplase (Cathflo Activase) injection 2 mg, 2 mg, intra-catheter, Daily, Ivan Posadas MD    amLODIPine (Norvasc) tablet 10 mg, 10 mg, oral, Daily, Ivan Posadas MD, 10 mg at 02/07/24 0819    atorvastatin (Lipitor) tablet 80 mg, 80 mg, oral, Nightly, Ivan Posadas MD, 80 mg at 02/07/24 2002    calcium acetate (Phoslo) capsule 2,000 mg, 2,000 mg, oral, TID with meals, Ivan Posadas MD, 2,000 mg at 02/08/24 1102    calcium carbonate (Tums) chewable tablet 1,000 mg, 1,000 mg, oral, TID, Cosmo Charles DO    carvedilol (Coreg) tablet 25 mg, 25 mg, oral, q12h, Ivan Posadas MD, 25 mg at 02/07/24 2002    cholecalciferol (Vitamin D-3) tablet 2,000 Units, 2,000 Units, oral, Daily, Ivan Posadas MD, 2,000 Units at 02/08/24 1101    dextrose 50 % injection 25 g, 25 g, intravenous, Once, Eusebio Antonio DO    epoetin tavia-epbx (Retacrit) injection 8,000 Units, 100 Units/kg, subcutaneous, Once per day on Mon Wed Fri, Ivan Posadas MD, 8,000 Units at 02/07/24 1821    heparin (porcine) injection 5,000 Units, 5,000 Units, subcutaneous, q8h, Ivan Posadas MD, 5,000 Units at 02/08/24 1103    [Held by provider] hydrALAZINE (Apresoline) tablet 100 mg, 100 mg, oral, BID, Ivan Posadas MD, 100 mg at 02/06/24 0918    levETIRAcetam (Keppra)  tablet 500 mg, 500 mg, oral, q12h, Phillip Ortiz MD, 500 mg at 02/08/24 1102    loperamide (Imodium) capsule 2 mg, 2 mg, oral, 4x daily PRN, ZANDER Ford    [Held by provider] losartan (Cozaar) tablet 25 mg, 25 mg, oral, Daily, Ivan Posadas MD    oxygen (O2) therapy, , inhalation, Continuous PRN - O2/gases, Ivan Posadas MD    DATA:   Diagnostic tests reviewed for today's visit:    Most recent labs  Results from last 7 days   Lab Units 02/08/24  0552 02/07/24  0512 02/06/24  0458   WBC AUTO x10*3/uL 12.8* 11.8* 16.0*   HEMOGLOBIN g/dL 8.7* 9.4* 8.7*   HEMATOCRIT % 27.9* 28.9* 25.3*   PLATELETS AUTO x10*3/uL 262 266 267       Results from last 7 days   Lab Units 02/08/24  0552 02/07/24  0512 02/06/24  0458 02/05/24  2053   SODIUM mmol/L 140 141 139 138   POTASSIUM mmol/L 2.9* 2.8* 2.5* 4.7   CHLORIDE mmol/L 99 98 100 103   CO2 mmol/L 23 24 14* 5*   BUN mg/dL 60* 49* 94* 156*   CREATININE mg/dL 17.94* 15.22* 22.85* >25.00*   CALCIUM mg/dL 6.8* 7.2* 6.7* 6.0*   PROTEIN TOTAL g/dL  --   --   --  7.9   BILIRUBIN TOTAL mg/dL  --   --   --  0.4   ALK PHOS U/L  --   --   --  111   ALT U/L  --   --   --  47   AST U/L  --   --   --  45*   GLUCOSE mg/dL 87 79 85 97         Results from last 7 days   Lab Units 02/08/24  1115 02/08/24  1022   POCT GLUCOSE mg/dL 313* 73*        XR chest 1 view   Final Result   No acute cardiopulmonary disease.   Signed by Ben Grewal MD            SIGNATURE: ZANDER Meredith PATIENT NAME: Soren Howard   DATE: 2/8/2024 MRN: [id number]   TIME: 1:41 PM

## 2024-02-08 NOTE — CARE PLAN
The patient's goals for the shift include      The clinical goals for the shift include Pt will remain hemodynamically stable by end of shift    Over the shift, the patient did make progress toward the following goals.

## 2024-02-08 NOTE — PROGRESS NOTES
PROGRESS NOTE - INTERNAL MEDICINE     PATIENT NAME:  Soren Howard    MRN:  [id number]  SERVICE DATE:  2/8/2024       ADMITTING PHYSICIAN:  Darek Hope MD    ASSESSMENT AND PLAN    Principal Problem:    Metabolic acidosis    Hypertension  Chronic diarrhea  Anemia of chronic disease  ESRD on hemodialysis, with poor compliance with dialysis, has LUE AVF  Seizure disorder, on Keppra in hosp. ?compliance at home   Severe acute metabolic acidosis, improving with dialysis     Plan:  Continue current medications.  Counseled on compliance with dialysis  Continue Imodium as needed for chronic diarrhea  Monitor for bleeding  Seizure precautions  D/W Dr. Charles  Neuro consult.        INTERVAL HISTORY OF PRESENT ILLNESS:  +seiz during HD. +ongoing diarrhea. No abdo pain. No chest pain/sob. No n/v/d. Oral intake fair. Feeling better. No fever/chills. acute events from last 24 hrs reviewed.     Pertinent ROS:  No abdominal pain / No Bleeding / No rashes     Discussed with nursing and case management team and the specialists involved in this patient's care. Reviewed the EMR and documentation from other care-givers.        OBJECTIVE  PHYSICAL EXAM:      GENERAL: AAOx3, cooperative resting comfortably  SKIN: Skin turgor normal. No rashes  HEENT: EOMI, no epistaxis, Moist mucosa.  LUNGS: Vesicular breath sounds, with no wheeze, no crepitations  CARDIAC: REGULAR. S1 and S2; no rubs, no murmur  ABDOMEN: Abdomen soft, non-tender. +BS.  EXTREMITIES: No edema, Good capillary refill.   NEURO: [facility] GOOD. No invol movements. Gait not assessed  MUSCULOSKELETAL: No acute inflammation            2/8/2024     1:17 AM 2/8/2024     3:38 AM 2/8/2024     6:00 AM 2/8/2024     7:58 AM 2/8/2024     8:30 AM 2/8/2024    10:20 AM 2/8/2024    10:58 AM   Vitals   Systolic 91 94  103   119   Diastolic 54 56  66   72   Heart Rate 78 71  73 72 79 89   Temp 36.9 °C (98.4 °F) 36.6 °C (97.9 °F)  36 °C (96.8 °F) 36.2 °C (97.2 °F) 36.2 °C (97.2 °F) 36.7 °C  (98 °F)   Resp    16   16   Weight (lb)   187.17       BMI   29.31 kg/m2       BSA (m2)   2 m2         Body mass index is 29.31 kg/m².    Intake/Output Summary (Last 24 hours) at 2/8/2024 1102  Last data filed at 2/8/2024 1020  Gross per 24 hour   Intake 1600 ml   Output 1900 ml   Net -300 ml           Current Facility-Administered Medications:     allopurinol (Zyloprim) tablet 100 mg, 100 mg, oral, Daily, Ivan Posadas MD, 100 mg at 02/07/24 0819    alteplase (Cathflo Activase) injection 2 mg, 2 mg, intra-catheter, Daily, Ivan Posadas MD    alteplase (Cathflo Activase) injection 2 mg, 2 mg, intra-catheter, Daily, Ivan Posadas MD    alteplase (Cathflo Activase) injection 2 mg, 2 mg, intra-catheter, Daily, Ivan Posadas MD    alteplase (Cathflo Activase) injection 2 mg, 2 mg, intra-catheter, Daily, Ivan Posadas MD    amLODIPine (Norvasc) tablet 10 mg, 10 mg, oral, Daily, Ivan Posadas MD, 10 mg at 02/07/24 0819    atorvastatin (Lipitor) tablet 80 mg, 80 mg, oral, Nightly, Ivan Posadas MD, 80 mg at 02/07/24 2002    calcium acetate (Phoslo) capsule 2,000 mg, 2,000 mg, oral, TID with meals, Ivan Posadas MD, 2,000 mg at 02/07/24 1813    carvedilol (Coreg) tablet 25 mg, 25 mg, oral, q12h, Ivan Posadas MD, 25 mg at 02/07/24 2002    cholecalciferol (Vitamin D-3) tablet 2,000 Units, 2,000 Units, oral, Daily, Ivan Posadas MD, 2,000 Units at 02/06/24 0606    dextrose 50 % injection 25 g, 25 g, intravenous, Once, Eusebio Antonio,     epoetin tavia-epbx (Retacrit) injection 8,000 Units, 100 Units/kg, subcutaneous, Once per day on Mon Wed Fri, Ivan Posadas MD, 8,000 Units at 02/07/24 1821    heparin (porcine) injection 5,000 Units, 5,000 Units, subcutaneous, q8h, Ivan Posadas MD, 5,000 Units at 02/07/24 1813    [Held by provider] hydrALAZINE (Apresoline) tablet 100 mg, 100 mg, oral, BID, Ivan Posadas MD, 100 mg at 02/06/24 0918    levETIRAcetam (Keppra) tablet 500 mg, 500 mg, oral, q12h, Phillip Ortiz MD, 500 mg at  02/07/24 2215    loperamide (Imodium) capsule 2 mg, 2 mg, oral, 4x daily PRN, Sandra Jamil, APRN-CNP    [Held by provider] losartan (Cozaar) tablet 25 mg, 25 mg, oral, Daily, Ivan Posadas MD    oxygen (O2) therapy, , inhalation, Continuous PRN - O2/gases, Ivan Posadas MD    potassium chloride CR (Klor-Con M20) ER tablet 20 mEq, 20 mEq, oral, Once, Nerissa Robledo, APRN-CNP    DATA:   Diagnostic tests reviewed for today's visit:    Most recent labs  Results from last 7 days   Lab Units 02/08/24 0552 02/07/24 0512 02/06/24  0458   WBC AUTO x10*3/uL 12.8* 11.8* 16.0*   HEMOGLOBIN g/dL 8.7* 9.4* 8.7*   HEMATOCRIT % 27.9* 28.9* 25.3*   PLATELETS AUTO x10*3/uL 262 266 267     Results from last 7 days   Lab Units 02/08/24 0552 02/07/24 0512 02/06/24 0458 02/05/24  2053   SODIUM mmol/L 140 141 139 138   POTASSIUM mmol/L 2.9* 2.8* 2.5* 4.7   CHLORIDE mmol/L 99 98 100 103   CO2 mmol/L 23 24 14* 5*   BUN mg/dL 60* 49* 94* 156*   CREATININE mg/dL 17.94* 15.22* 22.85* >25.00*   CALCIUM mg/dL 6.8* 7.2* 6.7* 6.0*   PROTEIN TOTAL g/dL  --   --   --  7.9   BILIRUBIN TOTAL mg/dL  --   --   --  0.4   ALK PHOS U/L  --   --   --  111   ALT U/L  --   --   --  47   AST U/L  --   --   --  45*   GLUCOSE mg/dL 87 79 85 97       Results from last 7 days   Lab Units 02/08/24  1022   POCT GLUCOSE mg/dL 73*      XR chest 1 view   Final Result   No acute cardiopulmonary disease.   Signed by Ben Grewal MD            SIGNATURE: Shannan Noriega MD PATIENT NAME: Soren Howard   DATE: 2/8/2024 MRN: [id number]   TIME: 11:02 AM

## 2024-02-08 NOTE — PRE-PROCEDURE NOTE
Report from Sending RN:    Report From: marquis moran   Recent Surgery of Procedure: No  Baseline Level of Consciousness (LOC): x4  Oxygen Use: No  Type: na  Diabetic: No  Last BP Med Given Day of Dialysis: see emar   Last Pain Med Given: see emar   Lab Tests to be Obtained with Dialysis: No  Blood Transfusion to be Given During Dialysis: No  Available IV Access: Yes  Medications to be Administered During Dialysis: No  Continuous IV Infusion Running: No  Restraints on Currently or in the Last 24 Hours: No  Hand-Off Communication: stable for hd no complaints

## 2024-02-08 NOTE — CARE PLAN
The patient's goals for the shift include  safety    The clinical goals for the shift include Pt will remain hemodynamically stable by end of shift    Problem: Daily Care  Goal: Daily care needs are met  Outcome: Progressing     Problem: Psychosocial Needs  Goal: Demonstrates ability to cope with hospitalization/illness  Outcome: Progressing     Problem: Respiratory  Goal: Minimize anxiety/maximize coping throughout shift  Outcome: Progressing     Problem: Safety  Goal: Patient will be injury free during hospitalization  Outcome: Progressing

## 2024-02-08 NOTE — PROCEDURES
Seen on dialysis.  F180 kidney, 4K bath, 3.5 calcium due to chronic hypocalcemia, BFR//700 x 3 hours.  He will require dialysis tomorrow to place him back on schedule.  Extensive discussion was had regarding compliance with dialysis.  Unfortunately he had a seizure during dialysis.  Unclear whether he has been taking his Keppra albeit he is receiving it in house.  The primary team will reach out to neurology.  Wondering if seizures are related to his hypocalcemia.  He has been noncompliant with his calcium supplements and dialysis where he is to receive a higher calcium bath.  I will place him back on 3 times daily calcium carbonate, he is receiving a calcium based binder.  Based on his phosphorus levels he is not compliant with binders. He will get erythropoietin.  Nephrology will follow.

## 2024-02-08 NOTE — CODE DOCUMENTATION
"Rapid response    At about 10:45 AM, rapid response call to PACU area, dialysis unit, and on arrival patient was undergoing hemodialysis, he was alert, conversational, not in acute distress.  Per dialysis staff patient was witnessed having seizure episode of about a minute duration described as tremulous extremities, no loss of consciousness, incontinence for both bowel or urinary.  He stated that he was recently diagnosed with seizures this past January, after admission at [facility], with EEG completed, started on Keppra 500 mg twice daily.  Patient denied prior history of seizures, or any tongue biting or incontinence during seizure episodes.  During assessment patient was found with blood glucose of 73    /72 (BP Location: Right arm)   Pulse 89   Temp 36.7 °C (98 °F) (Oral)   Resp 16   Ht 1.702 m (5' 7.01\")   Wt 84.9 kg (187 lb 2.7 oz)   SpO2 98%   BMI 29.31 kg/m²     Physical exam  Constitutional: Alert active, conversational, undergoing hemodialysis, cooperative not in acute distress  Eyes: PERRLA, clear sclera  ENMT: Moist mucosal membranes, no exudate  Head / Neck: Atraumatic, normocephalic, supple neck, JVP not visualized  Lungs: Patent airways, CTABL  Heart: RRR, S1S2, no murmurs appreciated, palpable pulses in all extremities  GI: Soft, NT, ND, bowel sounds present in all quadrants  MSK: Moves all extremities freely, no restriction  of ROM, no joint edema  Extremities: Intact x 4, no peripheral edema  : No Deshpande catheter inserted  Breast: Deferred  Neurological: AAO x 3 to person, place and date, facial muscles symmetrical, sensation intact, strength 4/4, no acute focal neurological deficits appreciated  Psychological: Appropriate mood and behavior    Scheduled medications  allopurinol, 100 mg, oral, Daily  alteplase, 2 mg, intra-catheter, Daily  alteplase, 2 mg, intra-catheter, Daily  alteplase, 2 mg, intra-catheter, Daily  alteplase, 2 mg, intra-catheter, Daily  amLODIPine, 10 mg, oral, " Daily  atorvastatin, 80 mg, oral, Nightly  calcium acetate, 2,000 mg, oral, TID with meals  carvedilol, 25 mg, oral, q12h  cholecalciferol, 2,000 Units, oral, Daily  dextrose, 25 g, intravenous, Once  epoetin tavia or biosimilar, 100 Units/kg, subcutaneous, Once per day on Mon Wed Fri  heparin (porcine), 5,000 Units, subcutaneous, q8h  [Held by provider] hydrALAZINE, 100 mg, oral, BID  levETIRAcetam, 500 mg, oral, q12h  [Held by provider] losartan, 25 mg, oral, Daily      Continuous medications     PRN medications  PRN medications: loperamide, oxygen  ECG 12 lead    Result Date: 2/6/2024  Normal sinus rhythm Possible Anteroseptal infarct (cited on or before 30-SEP-2023) T wave abnormality, consider lateral ischemia Prolonged QT Abnormal ECG When compared with ECG of 30-SEP-2023 10:34, Significant changes have occurred    XR chest 1 view    Result Date: 2/5/2024  STUDY: Chest Radiograph;  2/5/2024 6:11 PM. INDICATION: Chest pain. COMPARISON: CXR 9/30/2023 ACCESSION NUMBER(S): [id number] ORDERING CLINICIAN: Get Felipe TECHNIQUE:  Frontal chest was obtained at 18:11 hours. FINDINGS: CARDIOMEDIASTINAL SILHOUETTE: Cardiomediastinal silhouette is normal in size and configuration.  LUNGS: Lungs are clear.  ABDOMEN: No remarkable upper abdominal findings.  BONES: No acute osseous changes.  There is a mild scoliosis in the thoracic spine convex to the right.    No acute cardiopulmonary disease. Signed by Ben Grewal MD       Assessment and plan  -Hypoglycemia, D50 W infusion x 1  -Keppra level check  -Nursing staff to alert attending of event at dialysis    Eusebio Antonio DO  [facility] hospitalist  [facility]

## 2024-02-08 NOTE — CONSULTS
"Inpatient consult to Neurology  Consult performed by: Bartolome Samayoa MD  Consult ordered by: Shannan Noriega MD          History Of Present Illness  Soren Howard is a 48 y.o. male presenting with breakthrough seizure.    He has past medical history significant for end-stage renal disease on Monday/Wednesday/Friday hemodialysis, hypertension, hyperlipidemia, gout, angiodysplasia of stomach/duodenum, and seizures.    It appears from review of the chart that his first seizure occurred around 9/30 or 10/1/2023.  On 10/1 he was seen in consultation by Dr. Torres, neurology, for a first seizure occurring while he was in the ICU about to be started on dialysis after presenting to the hospital not feeling well in general.  That seizure which was witnessed by ICU nurse was characterized by right face and arm clonic activity that was followed by bilateral arm clonic activity, with eyes open, then eyes rolling back and patient starting to fall from the side of the bed.  Estimated duration was 30-90 seconds.  He was given 2 mg lorazepam after resolution.      He was evaluated and started on Keppra at that time.    He presented to the ED on 2/5 with dyspnea.  He had missed his previous dialysis appointment and then went to dialysis on 2/5 but it had to be curtailed due to diarrhea.  This morning he had a rapid response in the dialysis unit while undergoing hemodialysis, because of an episode of tremulous extremities lasting about 1 minute without loss of consciousness or incontinence.  Blood glucose was 73 at the time.    He indicates being prescribed a seizure medication, which he cannot name (Keppra), but that historically he has not taken it on a daily basis, only \"as needed\".  Apparently he determines this by whether he is feeling shaky or not.  He has not been taking a dose after dialysis.    He indicates that he drives and does not express awareness of having been told not to drive because of seizures.    I reviewed " neuroimaging studies from his previous admission.  Noncontrast head CT from 9/30/2023 appears unremarkable.  Contrasted brain MRI from 10/2/2023 likewise appears unremarkable.    Keppra has been resumed here as 500 mg every 12 hours and a level is pending.    Past Medical History  Past Medical History:   Diagnosis Date    Angiodysplasia of stomach and duodenum without bleeding     Gastric AV malformation    COVID-19     COVID-19 virus infection    Diverticulosis of intestine, part unspecified, without perforation or abscess without bleeding     Diverticulosis    End stage renal disease (CMS/HCC) 08/02/2021    ESRD (end stage renal disease)    Other hemorrhoids     Internal hemorrhoid    Personal history of diseases of the blood and blood-forming organs and certain disorders involving the immune mechanism     History of anemia    Personal history of other diseases of the circulatory system     History of hypertension    Personal history of other diseases of the musculoskeletal system and connective tissue     History of gout    Personal history of other endocrine, nutritional and metabolic disease     History of obesity    Personal history of other medical treatment     History of blood transfusion    Residual hemorrhoidal skin tags     External hemorrhoid    Smoker     Ulcer of anus and rectum     Rectal ulcer    Unspecified osteoarthritis, unspecified site     Osteoarthritis     Surgical History  Past Surgical History:   Procedure Laterality Date    CT ABDOMEN PELVIS ANGIOGRAM W AND/OR WO IV CONTRAST  5/24/2023    CT ABDOMEN PELVIS ANGIOGRAM W AND/OR WO IV CONTRAST 5/24/2023 [facility] CT    IR VENOGRAM DIALYSIS  5/26/2023    IR VENOGRAM DIALYSIS 5/26/2023 [facility] ANGIO    OTHER SURGICAL HISTORY  01/18/2022    Dialysis tunneled catheter placement    OTHER SURGICAL HISTORY  01/19/2022    Arteriovenous fistula creation procedure     Social History  Social History     Tobacco Use    Smoking status: Never    Smokeless tobacco:  Never     Allergies  Piperacillin-tazobactam-dextrs  Medications Prior to Admission   Medication Sig Dispense Refill Last Dose    allopurinol (Zyloprim) 100 mg tablet Take 1 tablet (100 mg) by mouth once daily.   2/5/2024    amLODIPine (Norvasc) 10 mg tablet Take 1 tablet (10 mg) by mouth once daily.   2/5/2024    atorvastatin (Lipitor) 80 mg tablet Take 1 tablet (80 mg) by mouth once daily at bedtime.   2/4/2024    calcium acetate (Phoslo) 667 mg capsule Take 3 capsules (2,000 mg) by mouth 3 times a day with meals. 270 capsule 5 2/5/2024    carvedilol (Coreg) 25 mg tablet Take 1 tablet (25 mg) by mouth every 12 hours.   2/5/2024    cholecalciferol (Vitamin D-3) 50 mcg (2,000 unit) capsule Take 1 capsule (50 mcg) by mouth early in the morning..   2/5/2024    colchicine 0.6 mg tablet Take 1 tablet (0.6 mg) by mouth once daily as needed for muscle/joint pain (Acut gout flare x 1-2 days). 30 tablet 1 2/5/2024    hydrALAZINE (Apresoline) 100 mg tablet Take 1 tablet (100 mg) by mouth 2 times a day. 60 tablet 6 2/5/2024    levETIRAcetam (Keppra) 500 mg tablet Take 1 tablet (500 mg) by mouth 2 times a day. 60 tablet 0     lidocaine-prilocaine (Emla) 2.5-2.5 % cream Apply thick layer 2 hours prior to dialysis site, cover with plastic wrap. 30 g 11 2/6/2024    losartan (Cozaar) 25 mg tablet Take 1 tablet (25 mg) by mouth once daily. 30 tablet 11 2/5/2024       Review of Systems    Review of Systems:  Neurologic:  As per the history of present illness.  Constitutional:  Negative for fevers.  Cardiovascular:  Negative for chest pain.  Respiratory:  Negative for dyspnea.  Eyes:  Negative for acute vision loss.  ENT:  Negative for acute hearing loss.  GI: Positive for diarrhea.  : End-stage renal disease.        Neurological Exam  Physical Exam    Physical Examination:    General: Alert, lying in bed in no acute distress.  No lateral tongue bite.    Mental Status: Clear sensorium without fluctuation.  Appropriate in  "conversation but taciturn, volunteering little.  Fluent unremarkable speech without paraphasic errors.  Oriented to self, month, year, not exact date.  Oriented to location.  Followed instructions accurately on exam.    Cranial Nerves: Pupils were equal, round and reactive to light with no relative afferent pupillary defect.  Extraocular movements were intact and conjugate without nystagmus.  No ptosis.  Visual fields were full to confrontation tested binocularly.  Facial movements were symmetrically intact.  Hearing was grossly intact.  No dysarthria or dysphonia.  Tongue protrusion was midline.    Motor: Muscle tone was normal throughout.  There was no pronator drift.  Confrontation strength was symmetrically 5/5 throughout.    Coordination: Finger to finger was accurate bilaterally without dysmetria or intention tremor.  No postural or rest tremor, myoclonus or dystonic posturing.    Tendon Reflexes: Symmetrically 2-3+ biceps and brachioradialis, 1-2+ patellar, neutral plantars.      Last Recorded Vitals  Blood pressure 119/72, pulse 89, temperature 36.7 °C (98 °F), temperature source Oral, resp. rate 16, height 1.702 m (5' 7.01\"), weight 84.9 kg (187 lb 2.7 oz), SpO2 98 %.    Relevant Results                    [place] Coma Scale  Best Eye Response: Spontaneous  Best Verbal Response: Oriented  Best Motor Response: Follows commands  [place] Coma Scale Score: 15                      Assessment/Plan     It is not entirely clear that the episode at dialysis today was a seizure, but the episode in October 2023 was more convincing.    He has in any event been poorly compliant with Keppra and for unclear reasons has not even been taking it daily.    Keppra dosing in patients on dialysis is typically 500-1000 mg immediate release once daily and then a supplemental dose after each dialysis session.  I reviewed this with him in detail.    I advised him explicitly NOT to drive until further notice.    Outpatient neurology " follow-up should be with Epilepsy, such as Dr. Danie Chatterjee.              Bartolome Samayoa MD

## 2024-02-08 NOTE — POST-PROCEDURE NOTE
Report to Receiving RN:    Report To: HAVEN REY  Time Report Called: 1040  Hand-Off Communication: Rapid response called on patient for an aproximate 1 minute long witnessed seizure with contracturing and eyes rolling back. Pt was unresponsive during seizure, however HR and BP remained stable. BS was check and IV dextrose was administered by Rapid team. Dr murcia notified of seizure and instructed to terminate tx. Patient ordered a 2 hours tx and only had aprox 20 minutes remaining. 1.5 liters net removed. Report called to HAVEN REY  Complications During Treatment: Yes  Ultrafiltration Treatment: No  Medications Administered During Dialysis: No  Blood Products Administered During Dialysis: No  Labs Sent During Dialysis: No  Heparin Drip Rate Changes: N/A    Electronic Signatures:  Nico Jacobson RN  (Signed )   Authored:    (Signed )   Authored:     Last Updated: 10:45 AM by NICO JACOBSON

## 2024-02-09 ENCOUNTER — PATIENT OUTREACH (OUTPATIENT)
Dept: CARE COORDINATION | Facility: CLINIC | Age: 49
End: 2024-02-09
Payer: COMMERCIAL

## 2024-02-09 SDOH — ECONOMIC STABILITY: FOOD INSECURITY
ARE ANY OF YOUR NEEDS URGENT? FOR EXAMPLE, UNCERTAINTY OF WHERE YOU WILL GET YOUR NEXT MEAL OR NOT HAVING THE MEDICATIONS YOU NEED TO TAKE TOMORROW.: NO

## 2024-02-09 SDOH — ECONOMIC STABILITY: GENERAL: WOULD YOU LIKE HELP WITH ANY OF THE FOLLOWING NEEDS?: I DONT NEED HELP WITH ANY OF THESE

## 2024-02-09 NOTE — PROGRESS NOTES
"Discharge facility: [facility]  Discharge diagnosis: Metabolic acidosis  Admission date: 2/5/24  Discharge date: 2/8/24  Follow Up Appointment Date: Outpatient neurology follow-up should be with Epilepsy, such as Dr. Danie Chatterjee per ID instructions.    Outreach call to patient to support a smooth transition of care from recent admission.  Spoke with patient, reviewed discharge medications, discharge instructions, assessed social needs, and provided education on importance of follow-up appointment with provider. Enrolled patient in [employer] chatbot for additional support and education through transition period.  Will continue to monitor through transition period.     Engagement  Call Start Time: 1227 (2/9/2024  1:49 PM)    Medications  Medications reviewed with patient/caregiver?: Yes (2/9/2024  1:49 PM)  Is the patient having any side effects they believe may be caused by any medication additions or changes?: No (2/9/2024  1:49 PM)  Does the patient have all medications ordered at discharge?: Yes (2/9/2024  1:49 PM)  Care Management Interventions: No intervention needed (2/9/2024  1:49 PM)  Is the patient taking all medications as directed (includes completed medication regime)?: Yes (2/9/2024  1:49 PM)    Appointments  Does the patient have a primary care provider?: Yes (2/9/2024  1:49 PM)  Care Management Interventions: Educated patient on importance of making appointment (pt informed me that \"he will call Dr. Chatterjee [phone] to schedule his follow up appt today.\") (2/9/2024  1:49 PM)    Patient Teaching  Does the patient have access to their discharge instructions?: Yes (2/9/2024  1:49 PM)  Care Management Interventions: Reviewed instructions with patient (2/9/2024  1:49 PM)  What is the patient's perception of their health status since discharge?: Improving (2/9/2024  1:49 PM)  Is the patient/caregiver able to teach back the hierarchy of who to call/visit for symptoms/problems? PCP, Specialist, " Home Health nurse, Urgent Care, ED, 911: Yes (2/9/2024  1:49 PM)    Wrap Up  Is the patient/caregiver familiar with Advance Care Planning?: Yes (2/9/2024  1:49 PM)  Would the patient like more information on Advance Care Planning?: No (2/9/2024  1:49 PM)  Call End Time: 1356 (2/9/2024  1:49 PM)    Jaleesa Houston RN

## 2024-02-09 NOTE — DISCHARGE SUMMARY
Discharge Summary    Admit Date: 2/5/2024  Discharge Date: 2/8/2024      Discharge Diagnosis  Hypertension  Chronic diarrhea  Anemia of chronic disease  ESRD on hemodialysis, with poor compliance with dialysis, has LUE AVF  Seizure disorder, on Keppra in hosp. ?compliance at home   Severe acute metabolic acidosis, improving with dialysis    Issues Requiring Follow-Up  ESRD and seiz.    Test Results Pending At Discharge  Pending Labs       Order Current Status    Blood Culture Preliminary result    Blood Culture Preliminary result            Hospital Course   Adm for multiple missed HD. Noted to have severe met acidosis. Improved with HD. Had seiz during HD when he was almost ready for DC attributed to noncompliance with Keppra at home. Resumed keppra and enc OP neuro follow up.    Pertinent Physical Exam At Time of Discharge  Physical Exam    Home Medications     Medication List      START taking these medications     calcium carbonate 200 mg calcium chewable tablet; Commonly known as:   Tums; Chew 2 tablets (1,000 mg) 3 times a day.     CHANGE how you take these medications     levETIRAcetam 500 mg tablet; Commonly known as: Keppra; Take 1 tablet   (500 mg) by mouth once daily. And one additional tablet after dialysis;   What changed: when to take this, additional instructions     CONTINUE taking these medications     allopurinol 100 mg tablet; Commonly known as: Zyloprim   amLODIPine 10 mg tablet; Commonly known as: Norvasc   atorvastatin 80 mg tablet; Commonly known as: Lipitor   calcium acetate 667 mg capsule; Commonly known as: Phoslo; Take 3   capsules (2,000 mg) by mouth 3 times a day with meals.   carvedilol 25 mg tablet; Commonly known as: Coreg   cholecalciferol 50 mcg (2,000 unit) capsule; Commonly known as: Vitamin   D-3   colchicine 0.6 mg tablet; Take 1 tablet (0.6 mg) by mouth once daily as   needed for muscle/joint pain (Acut gout flare x 1-2 days).   hydrALAZINE 100 mg tablet; Commonly known as:  Apresoline; Take 1 tablet   (100 mg) by mouth 2 times a day.   lidocaine-prilocaine 2.5-2.5 % cream; Commonly known as: Emla; Apply   thick layer 2 hours prior to dialysis site, cover with plastic wrap.   losartan 25 mg tablet; Commonly known as: Cozaar; Take 1 tablet (25 mg)   by mouth once daily.       Outpatient Follow-Up  No future appointments.    Shannan Noriega MD

## 2024-02-10 LAB
BACTERIA BLD CULT: NORMAL
BACTERIA BLD CULT: NORMAL

## 2024-02-23 NOTE — PROGRESS NOTES
"Spoke w/ Mr. Howard   Pt identified by name and     Mr. Howard informed me that \"he continues to do well, no complaints at this time, he will call Dr. Danie Chatterjee office at [phone] today to schedule his follow up appt, he has no questions or concerns regarding his healthcare at this time.\"    Jaleesa Houston RN    "

## 2024-03-10 ENCOUNTER — HOSPITAL ENCOUNTER (OUTPATIENT)
Facility: HOSPITAL | Age: 49
Setting detail: OBSERVATION
Discharge: LEFT BEFORE DISCHARGE | End: 2024-03-10
Attending: EMERGENCY MEDICINE | Admitting: INTERNAL MEDICINE
Payer: COMMERCIAL

## 2024-03-10 VITALS
DIASTOLIC BLOOD PRESSURE: 95 MMHG | SYSTOLIC BLOOD PRESSURE: 151 MMHG | WEIGHT: 209.66 LBS | BODY MASS INDEX: 32.91 KG/M2 | HEART RATE: 79 BPM | HEIGHT: 67 IN | RESPIRATION RATE: 18 BRPM | OXYGEN SATURATION: 98 % | TEMPERATURE: 97.5 F

## 2024-03-10 DIAGNOSIS — N18.5 CHRONIC RENAL FAILURE, STAGE 5 (MULTI): ICD-10-CM

## 2024-03-10 DIAGNOSIS — D63.1 ANEMIA DUE TO STAGE 4 CHRONIC KIDNEY DISEASE (MULTI): Primary | ICD-10-CM

## 2024-03-10 DIAGNOSIS — N18.4 ANEMIA DUE TO STAGE 4 CHRONIC KIDNEY DISEASE (MULTI): Primary | ICD-10-CM

## 2024-03-10 PROBLEM — D64.9 ANEMIA, UNSPECIFIED: Status: ACTIVE | Noted: 2024-03-10

## 2024-03-10 LAB
ABO GROUP (TYPE) IN BLOOD: NORMAL
ANION GAP BLDV CALCULATED.4IONS-SCNC: 17 MMOL/L (ref 10–25)
ANION GAP SERPL CALC-SCNC: 23 MMOL/L (ref 10–20)
ANTIBODY SCREEN: NORMAL
BASE EXCESS BLDV CALC-SCNC: -10.7 MMOL/L (ref -2–3)
BLOOD EXPIRATION DATE: NORMAL
BODY TEMPERATURE: 37 DEGREES CELSIUS
BUN SERPL-MCNC: 123 MG/DL (ref 6–23)
CA-I BLDV-SCNC: 0.73 MMOL/L (ref 1.1–1.33)
CALCIUM SERPL-MCNC: 5.7 MG/DL (ref 8.6–10.3)
CHLORIDE BLDV-SCNC: 111 MMOL/L (ref 98–107)
CHLORIDE SERPL-SCNC: 107 MMOL/L (ref 98–107)
CO2 SERPL-SCNC: 13 MMOL/L (ref 21–32)
CREAT SERPL-MCNC: 21.19 MG/DL (ref 0.5–1.3)
DISPENSE STATUS: NORMAL
EGFRCR SERPLBLD CKD-EPI 2021: 2 ML/MIN/1.73M*2
ERYTHROCYTE [DISTWIDTH] IN BLOOD BY AUTOMATED COUNT: 20 % (ref 11.5–14.5)
GLUCOSE BLDV-MCNC: 59 MG/DL (ref 74–99)
GLUCOSE SERPL-MCNC: 73 MG/DL (ref 74–99)
HCO3 BLDV-SCNC: 15.2 MMOL/L (ref 22–26)
HCT VFR BLD AUTO: 18.5 % (ref 41–52)
HCT VFR BLD EST: 17 % (ref 41–52)
HGB BLD-MCNC: 5.7 G/DL (ref 13.5–17.5)
HGB BLDV-MCNC: 5.7 G/DL (ref 13.5–17.5)
INHALED O2 CONCENTRATION: 21 %
LACTATE BLDV-SCNC: 0.8 MMOL/L (ref 0.4–2)
MCH RBC QN AUTO: 27 PG (ref 26–34)
MCHC RBC AUTO-ENTMCNC: 30.8 G/DL (ref 32–36)
MCV RBC AUTO: 88 FL (ref 80–100)
NRBC BLD-RTO: 0 /100 WBCS (ref 0–0)
OXYHGB MFR BLDV: 90.1 % (ref 45–75)
PCO2 BLDV: 33 MM HG (ref 41–51)
PH BLDV: 7.27 PH (ref 7.33–7.43)
PLATELET # BLD AUTO: 160 X10*3/UL (ref 150–450)
PO2 BLDV: 62 MM HG (ref 35–45)
POTASSIUM BLDV-SCNC: 3.6 MMOL/L (ref 3.5–5.3)
POTASSIUM SERPL-SCNC: 3.4 MMOL/L (ref 3.5–5.3)
PRODUCT BLOOD TYPE: 5100
PRODUCT CODE: NORMAL
RBC # BLD AUTO: 2.11 X10*6/UL (ref 4.5–5.9)
RH FACTOR (ANTIGEN D): NORMAL
SAO2 % BLDV: 92 % (ref 45–75)
SODIUM BLDV-SCNC: 140 MMOL/L (ref 136–145)
SODIUM SERPL-SCNC: 140 MMOL/L (ref 136–145)
UNIT ABO: NORMAL
UNIT NUMBER: NORMAL
UNIT RH: NORMAL
UNIT VOLUME: 350
WBC # BLD AUTO: 5.3 X10*3/UL (ref 4.4–11.3)
XM INTEP: NORMAL

## 2024-03-10 PROCEDURE — 86920 COMPATIBILITY TEST SPIN: CPT

## 2024-03-10 PROCEDURE — G0378 HOSPITAL OBSERVATION PER HR: HCPCS

## 2024-03-10 PROCEDURE — 85027 COMPLETE CBC AUTOMATED: CPT | Performed by: EMERGENCY MEDICINE

## 2024-03-10 PROCEDURE — 99285 EMERGENCY DEPT VISIT HI MDM: CPT | Mod: 25

## 2024-03-10 PROCEDURE — 36415 COLL VENOUS BLD VENIPUNCTURE: CPT | Performed by: EMERGENCY MEDICINE

## 2024-03-10 PROCEDURE — 86901 BLOOD TYPING SEROLOGIC RH(D): CPT | Performed by: EMERGENCY MEDICINE

## 2024-03-10 PROCEDURE — 80048 BASIC METABOLIC PNL TOTAL CA: CPT | Performed by: EMERGENCY MEDICINE

## 2024-03-10 PROCEDURE — P9040 RBC LEUKOREDUCED IRRADIATED: HCPCS

## 2024-03-10 PROCEDURE — 84132 ASSAY OF SERUM POTASSIUM: CPT | Mod: 59 | Performed by: EMERGENCY MEDICINE

## 2024-03-10 PROCEDURE — P9016 RBC LEUKOCYTES REDUCED: HCPCS

## 2024-03-10 PROCEDURE — 36430 TRANSFUSION BLD/BLD COMPNT: CPT

## 2024-03-10 RX ORDER — CALCIUM ACETATE 667 MG/1
2000 CAPSULE ORAL
Status: DISCONTINUED | OUTPATIENT
Start: 2024-03-10 | End: 2024-03-10 | Stop reason: HOSPADM

## 2024-03-10 RX ORDER — AMLODIPINE BESYLATE 10 MG/1
10 TABLET ORAL DAILY
Status: DISCONTINUED | OUTPATIENT
Start: 2024-03-10 | End: 2024-03-10 | Stop reason: HOSPADM

## 2024-03-10 RX ORDER — LEVETIRACETAM 500 MG/1
500 TABLET ORAL DAILY PRN
Status: DISCONTINUED | OUTPATIENT
Start: 2024-03-10 | End: 2024-03-10 | Stop reason: HOSPADM

## 2024-03-10 RX ORDER — LEVETIRACETAM 500 MG/1
500 TABLET ORAL DAILY
Status: DISCONTINUED | OUTPATIENT
Start: 2024-03-10 | End: 2024-03-10 | Stop reason: HOSPADM

## 2024-03-10 RX ORDER — POLYETHYLENE GLYCOL 3350 17 G/17G
17 POWDER, FOR SOLUTION ORAL DAILY
Status: DISCONTINUED | OUTPATIENT
Start: 2024-03-10 | End: 2024-03-10 | Stop reason: HOSPADM

## 2024-03-10 RX ORDER — ATORVASTATIN CALCIUM 80 MG/1
80 TABLET, FILM COATED ORAL NIGHTLY
Status: DISCONTINUED | OUTPATIENT
Start: 2024-03-10 | End: 2024-03-10 | Stop reason: HOSPADM

## 2024-03-10 RX ORDER — CARVEDILOL 25 MG/1
25 TABLET ORAL EVERY 12 HOURS
Status: DISCONTINUED | OUTPATIENT
Start: 2024-03-10 | End: 2024-03-10 | Stop reason: HOSPADM

## 2024-03-10 RX ORDER — POLYETHYLENE GLYCOL 3350 17 G/17G
17 POWDER, FOR SOLUTION ORAL DAILY
Status: CANCELLED | OUTPATIENT
Start: 2024-03-10

## 2024-03-10 RX ORDER — HYDRALAZINE HYDROCHLORIDE 50 MG/1
100 TABLET, FILM COATED ORAL 2 TIMES DAILY
Status: DISCONTINUED | OUTPATIENT
Start: 2024-03-10 | End: 2024-03-10 | Stop reason: HOSPADM

## 2024-03-10 ASSESSMENT — PAIN SCALES - GENERAL
PAINLEVEL_OUTOF10: 0 - NO PAIN
PAINLEVEL_OUTOF10: 0 - NO PAIN

## 2024-03-10 ASSESSMENT — PAIN - FUNCTIONAL ASSESSMENT: PAIN_FUNCTIONAL_ASSESSMENT: 0-10

## 2024-03-10 NOTE — CONSULTS
Reason For Consult  ESKD for scheduled dialysis on Monday/Wednesday/Friday    History Of Present Illness  Soren Howard is a 48 y.o. male presenting with history of ESKD on hemodialysis at [facility] on Monday/Wednesday/Friday.  Labs were drawn in the facility which showed that his hemoglobin was down to 5.7 with profoundly elevated BUN/creatinine and patient was asked to go to the emergency department for further follow-up and management.  Patient denied history of shortness of breath, chest pain, melena, hemoptysis or without hematochezia, no history of altered mental status or falls or dizziness.  Patient has history of hypertension with history of angiodysplasia of the stomach and duodenum noted before and diverticulosis of intestine in the past.  When I saw the patient he already had 1 unit blood transfusion packed RBCs noted at the bedside.  Patient was anxious to leave the ED to go back to his outpatient dialysis if needed.  Past Medical History  He has a past medical history of Angiodysplasia of stomach and duodenum without bleeding, COVID-19, Diverticulosis of intestine, part unspecified, without perforation or abscess without bleeding, End stage renal disease (CMS/HCC) (08/02/2021), Other hemorrhoids, Personal history of diseases of the blood and blood-forming organs and certain disorders involving the immune mechanism, Personal history of other diseases of the circulatory system, Personal history of other diseases of the musculoskeletal system and connective tissue, Personal history of other endocrine, nutritional and metabolic disease, Personal history of other medical treatment, Residual hemorrhoidal skin tags, Smoker, Ulcer of anus and rectum, and Unspecified osteoarthritis, unspecified site.    Surgical History  He has a past surgical history that includes Other surgical history (01/18/2022); Other surgical history (01/19/2022); CT angio abdomen pelvis w and or wo IV IV contrast (5/24/2023); and IR  "venogram dialysis (5/26/2023).     Social History  He reports that he has never smoked. He has never used smokeless tobacco. No history on file for alcohol use and drug use.    Family History  No family history on file.     Allergies  Piperacillin-tazobactam-dextrs    Review of Systems  All systems were reviewed     Physical Exam    GEN appearance: Awake and alert no acute distress  Head and ENT: Normocephalic/atraumatic/supple neck/no JVD  Lungs: Clear to auscultation  Heart: RRR  Abdomen: Soft no tenderness  Extremities: No edema,  Positive bruit and thrill over his left upper arm AV fistula used for hemodialysis  Neurologic; physiologic         I&O 24HR    Intake/Output Summary (Last 24 hours) at 3/10/2024 1253  Last data filed at 3/10/2024 1038  Gross per 24 hour   Intake 396.25 ml   Output --   Net 396.25 ml       Vitals 24HR  Heart Rate:  [70-85]   Temperature:  [36.4 °C (97.5 °F)-36.8 °C (98.2 °F)]   Respirations:  [16-18]   BP: (130-174)/()   Height:  [170.2 cm (5' 7\")]   Weight:  [95.1 kg (209 lb 10.5 oz)]   Pulse Ox:  [95 %-100 %]         Relevant Results     Results from last 7 days   Lab Units 03/10/24  0548   SODIUM mmol/L 140   POTASSIUM mmol/L 3.4*   CHLORIDE mmol/L 107   CO2 mmol/L 13*   BUN mg/dL 123*   CREATININE mg/dL 21.19*   GLUCOSE mg/dL 73*   CALCIUM mg/dL 5.7*      Results from last 7 days   Lab Units 03/10/24  0548   WBC AUTO x10*3/uL 5.3   HEMOGLOBIN g/dL 5.7*   HEMATOCRIT % 18.5*   PLATELETS AUTO x10*3/uL 160    No results found.   No results found.    Assessment/Plan     1.  ESKD on hemodialysis at [facility] on Monday/Wednesday/Friday  We will schedule patient for hemodialysis tomorrow  With the labs noted as far as BUN/creatinine is concerned,  I wonder if he is having recirculation in his dialysis access,  Dr. Charles will evaluate and order appropriate testing as he finds if needed.  2.  Hypertension, continue current treatment  3.  Life-threatening anemia, continue blood " transfusion as scheduled and GI consultation if needed.  4.  Metabolic bone disease due to CKD, continue current management with phosphate binders and vitamin D.    Will follow patient daily reviewing CBC and BMP and clinical management and examination and all other consultants input    Principal Problem:    Anemia, unspecified  Active Problems:    Anemia due to stage 4 chronic kidney disease (CMS/HCC)    Chronic renal failure, stage 5 (CMS/HCC)      I spent 54 minutes in the professional and overall care of this patient.      Lonny Armstrong MD

## 2024-03-10 NOTE — H&P
PRIMARY CARE PHYSICIAN:  No Assigned PCP Generic Provider, MD ADMITTING PHYSICIAN Tawanda Pena MD   MRN# [id number]   Admission Date: 3/10/2024     Subjective   CHIEF COMPLAINT: Anemia    HISTORY OF PRESENT ILLNESS Soren Howard is a 48 y.o. male with a history of ESRD on HD & Blood loss anemia was advised to come to the hospital because of low hemoglobin. He denies any symptoms. He denies any blood in stools, tarry stools or vomiting. No abdominal pain or dizziness.      Past Medical history     Past Medical History:   Diagnosis Date    Angiodysplasia of stomach and duodenum without bleeding     Gastric AV malformation    COVID-19     COVID-19 virus infection    Diverticulosis of intestine, part unspecified, without perforation or abscess without bleeding     Diverticulosis    End stage renal disease (CMS/HCC) 08/02/2021    ESRD (end stage renal disease)    Other hemorrhoids     Internal hemorrhoid    Personal history of diseases of the blood and blood-forming organs and certain disorders involving the immune mechanism     History of anemia    Personal history of other diseases of the circulatory system     History of hypertension    Personal history of other diseases of the musculoskeletal system and connective tissue     History of gout    Personal history of other endocrine, nutritional and metabolic disease     History of obesity    Personal history of other medical treatment     History of blood transfusion    Residual hemorrhoidal skin tags     External hemorrhoid    Smoker     Ulcer of anus and rectum     Rectal ulcer    Unspecified osteoarthritis, unspecified site     Osteoarthritis        Past Surgical history  Past Surgical History:   Procedure Laterality Date    CT ABDOMEN PELVIS ANGIOGRAM W AND/OR WO IV CONTRAST  5/24/2023    CT ABDOMEN PELVIS ANGIOGRAM W AND/OR WO IV CONTRAST 5/24/2023 [facility]    IR VENOGRAM DIALYSIS  5/26/2023    IR VENOGRAM DIALYSIS 5/26/2023 [facility]    OTHER  SURGICAL HISTORY  01/18/2022    Dialysis tunneled catheter placement    OTHER SURGICAL HISTORY  01/19/2022    Arteriovenous fistula creation procedure       No family history on file.    Social History     Socioeconomic History    Marital status:      Spouse name: Not on file    Number of children: Not on file    Years of education: Not on file    Highest education level: Not on file   Occupational History    Not on file   Tobacco Use    Smoking status: Never    Smokeless tobacco: Never   Substance and Sexual Activity    Alcohol use: Not on file    Drug use: Not on file    Sexual activity: Not on file   Other Topics Concern    Not on file   Social History Narrative    Not on file     Social Determinants of Health     Financial Resource Strain: Low Risk  (2/6/2024)    Overall Financial Resource Strain (CARDIA)     Difficulty of Paying Living Expenses: Not hard at all   Food Insecurity: Not on file   Transportation Needs: No Transportation Needs (2/6/2024)    PRAPARE - Transportation     Lack of Transportation (Medical): No     Lack of Transportation (Non-Medical): No   Physical Activity: Not on file   Stress: Not on file   Social Connections: Not on file   Intimate Partner Violence: Not on file   Housing Stability: Unknown (2/6/2024)    Housing Stability Vital Sign     Unable to Pay for Housing in the Last Year: No     Number of Places Lived in the Last Year: Not on file     Unstable Housing in the Last Year: No       Medications  (Not in a hospital admission)       Allergies   Allergen Reactions    Piperacillin-Tazobactam-Dextrs Hives       Complete Review of symptoms  GENERAL: No, fever, chills, or weight loss  HEENT: No, blurred vision, or hearing loss  CARDIAC: No chest pain, shortness of breath, palpitations or leg swelling  RESPIRATORY: No or cough  GI: No nausea, vomiting, diarrhea, or constipation  : No, dysuria, or frequency  SKIN: no rash, itching discoloration, jaundice or rash  ENDOCRINE: no or  polydipsia  PSYCH: No anxiety, mood disorder hallucinations or psychiatric symptoms  NEURO: No, blurred vision, slurred speech, facial droop, headache, loss of balance, or word finding difficulties    Objective     PHYSICAL EXAM:  Patient Vitals for the past 24 hrs:   BP Temp Temp src Pulse Resp SpO2 Height Weight   03/10/24 1330 -- -- -- 71 -- 100 % -- --   03/10/24 1315 (!) 159/99 -- -- 81 -- 99 % -- --   03/10/24 1300 (!) 156/97 -- -- 76 -- 100 % -- --   03/10/24 1230 (!) 146/92 -- -- 78 -- 100 % -- --   03/10/24 1229 (!) 146/92 -- -- 78 18 100 % -- --   03/10/24 1215 -- -- -- 83 -- 100 % -- --   03/10/24 1200 (!) 165/108 -- -- 76 -- 97 % -- --   03/10/24 1145 (!) 170/109 -- -- 75 -- 98 % -- --   03/10/24 1140 150/86 36.4 °C (97.5 °F) Oral 76 18 -- -- --   03/10/24 1130 148/81 -- -- 75 -- 98 % -- --   03/10/24 1124 147/82 36.5 °C (97.7 °F) Oral 78 16 99 % -- --   03/10/24 1115 137/80 -- -- 76 -- 98 % -- --   03/10/24 1100 143/76 -- -- 75 -- 97 % -- --   03/10/24 1045 147/83 -- -- 81 -- 98 % -- --   03/10/24 1038 141/76 36.7 °C (98 °F) Oral 70 18 100 % -- --   03/10/24 1030 141/76 -- -- 70 -- 95 % -- --   03/10/24 1015 130/75 -- -- 75 -- 97 % -- --   03/10/24 1000 138/77 -- -- 77 -- 98 % -- --   03/10/24 0945 (!) 174/103 -- -- 77 -- 98 % -- --   03/10/24 0930 (!) 173/104 -- -- 76 -- 99 % -- --   03/10/24 0915 138/75 -- -- 81 -- 98 % -- --   03/10/24 0900 130/72 36.8 °C (98.2 °F) Temporal 74 -- 97 % -- --   03/10/24 0845 147/90 -- -- 80 17 95 % -- --   03/10/24 0830 (!) 151/92 -- -- 79 -- 99 % -- --   03/10/24 0815 143/83 -- -- 76 -- 98 % -- --   03/10/24 0807 (!) 148/91 36.7 °C (98 °F) Oral 77 18 99 % -- --   03/10/24 0800 (!) 149/94 36.6 °C (97.9 °F) Oral 75 18 98 % -- --   03/10/24 0752 (!) 151/94 36.6 °C (97.9 °F) Oral 78 16 100 % -- --   03/10/24 0745 -- -- -- -- -- 99 % -- --   03/10/24 0700 (!) 148/97 -- -- 77 -- 100 % -- --   03/10/24 0645 -- -- -- 76 -- 99 % -- --   03/10/24 0630 134/88 -- -- 78 -- 98 % --  "--   03/10/24 0533 132/85 36.7 °C (98.1 °F) Oral 85 18 99 % 1.702 m (5' 7\") 95.1 kg (209 lb 10.5 oz)     Blood pressure (!) 159/99, pulse 71, temperature 36.4 °C (97.5 °F), temperature source Oral, resp. rate 18, height 1.702 m (5' 7\"), weight 95.1 kg (209 lb 10.5 oz), SpO2 100 %.  Patient Vitals for the past 24 hrs:   BP Temp Temp src Pulse Resp SpO2 Height Weight   03/10/24 1330 -- -- -- 71 -- 100 % -- --   03/10/24 1315 (!) 159/99 -- -- 81 -- 99 % -- --   03/10/24 1300 (!) 156/97 -- -- 76 -- 100 % -- --   03/10/24 1230 (!) 146/92 -- -- 78 -- 100 % -- --   03/10/24 1229 (!) 146/92 -- -- 78 18 100 % -- --   03/10/24 1215 -- -- -- 83 -- 100 % -- --   03/10/24 1200 (!) 165/108 -- -- 76 -- 97 % -- --   03/10/24 1145 (!) 170/109 -- -- 75 -- 98 % -- --   03/10/24 1140 150/86 36.4 °C (97.5 °F) Oral 76 18 -- -- --   03/10/24 1130 148/81 -- -- 75 -- 98 % -- --   03/10/24 1124 147/82 36.5 °C (97.7 °F) Oral 78 16 99 % -- --   03/10/24 1115 137/80 -- -- 76 -- 98 % -- --   03/10/24 1100 143/76 -- -- 75 -- 97 % -- --   03/10/24 1045 147/83 -- -- 81 -- 98 % -- --   03/10/24 1038 141/76 36.7 °C (98 °F) Oral 70 18 100 % -- --   03/10/24 1030 141/76 -- -- 70 -- 95 % -- --   03/10/24 1015 130/75 -- -- 75 -- 97 % -- --   03/10/24 1000 138/77 -- -- 77 -- 98 % -- --   03/10/24 0945 (!) 174/103 -- -- 77 -- 98 % -- --   03/10/24 0930 (!) 173/104 -- -- 76 -- 99 % -- --   03/10/24 0915 138/75 -- -- 81 -- 98 % -- --   03/10/24 0900 130/72 36.8 °C (98.2 °F) Temporal 74 -- 97 % -- --   03/10/24 0845 147/90 -- -- 80 17 95 % -- --   03/10/24 0830 (!) 151/92 -- -- 79 -- 99 % -- --   03/10/24 0815 143/83 -- -- 76 -- 98 % -- --   03/10/24 0807 (!) 148/91 36.7 °C (98 °F) Oral 77 18 99 % -- --   03/10/24 0800 (!) 149/94 36.6 °C (97.9 °F) Oral 75 18 98 % -- --   03/10/24 0752 (!) 151/94 36.6 °C (97.9 °F) Oral 78 16 100 % -- --   03/10/24 0745 -- -- -- -- -- 99 % -- --   03/10/24 0700 (!) 148/97 -- -- 77 -- 100 % -- --   03/10/24 0645 -- -- -- 76 -- " "99 % -- --   03/10/24 0630 134/88 -- -- 78 -- 98 % -- --   03/10/24 0533 132/85 36.7 °C (98.1 °F) Oral 85 18 99 % 1.702 m (5' 7\") 95.1 kg (209 lb 10.5 oz)     Body mass index is 32.84 kg/m².  GENERAL: alert, cooperative, or no distress  SKIN: Pale and no rashes  OROPHARYNX:   NECK: supple, no thyromegaly, JVP within normal limits  LUNGS:  not in respiratory distress, respiratory rate normal, clear to auscultation  CARDIAC: rate regular and regular rhythm, normal S1 and S2, no murmur, rub, or gallop heard.  ABDOMEN: Soft, non-tender, normal bowel sounds; no bruits, organomegaly or masses.  EXTREMETIES: No edema  NEURO: Alert and oriented x 3,   ., reflexes normal and symmetric, strength and  sensation grossly normal    DATA:   Diagnostic tests reviewed for today's visit:    Most recent  labs and imaging results  Results for orders placed or performed during the hospital encounter of 03/10/24 (from the past 96 hour(s))   CBC   Result Value Ref Range    WBC 5.3 4.4 - 11.3 x10*3/uL    nRBC 0.0 0.0 - 0.0 /100 WBCs    RBC 2.11 (L) 4.50 - 5.90 x10*6/uL    Hemoglobin 5.7 (LL) 13.5 - 17.5 g/dL    Hematocrit 18.5 (L) 41.0 - 52.0 %    MCV 88 80 - 100 fL    MCH 27.0 26.0 - 34.0 pg    MCHC 30.8 (L) 32.0 - 36.0 g/dL    RDW 20.0 (H) 11.5 - 14.5 %    Platelets 160 150 - 450 x10*3/uL   Basic metabolic panel   Result Value Ref Range    Glucose 73 (L) 74 - 99 mg/dL    Sodium 140 136 - 145 mmol/L    Potassium 3.4 (L) 3.5 - 5.3 mmol/L    Chloride 107 98 - 107 mmol/L    Bicarbonate 13 (L) 21 - 32 mmol/L    Anion Gap 23 (H) 10 - 20 mmol/L    Urea Nitrogen 123 (HH) 6 - 23 mg/dL    Creatinine 21.19 (H) 0.50 - 1.30 mg/dL    eGFR 2 (L) >60 mL/min/1.73m*2    Calcium 5.7 (L) 8.6 - 10.3 mg/dL   Type and Screen   Result Value Ref Range    ABO TYPE O     Rh TYPE POS     ANTIBODY SCREEN NEG    Blood Gas Venous Full Panel   Result Value Ref Range    POCT pH, Venous 7.27 (L) 7.33 - 7.43 pH    POCT pCO2, Venous 33 (L) 41 - 51 mm Hg    POCT pO2, Venous " 62 (H) 35 - 45 mm Hg    POCT SO2, Venous 92 (H) 45 - 75 %    POCT Oxy Hemoglobin, Venous 90.1 (H) 45.0 - 75.0 %    POCT Hematocrit Calculated, Venous 17.0 (L) 41.0 - 52.0 %    POCT Sodium, Venous 140 136 - 145 mmol/L    POCT Potassium, Venous 3.6 3.5 - 5.3 mmol/L    POCT Chloride, Venous 111 (H) 98 - 107 mmol/L    POCT Ionized Calicum, Venous 0.73 (LL) 1.10 - 1.33 mmol/L    POCT Glucose, Venous 59 (L) 74 - 99 mg/dL    POCT Lactate, Venous 0.8 0.4 - 2.0 mmol/L    POCT Base Excess, Venous -10.7 (L) -2.0 - 3.0 mmol/L    POCT HCO3 Calculated, Venous 15.2 (L) 22.0 - 26.0 mmol/L    POCT Hemoglobin, Venous 5.7 (LL) 13.5 - 17.5 g/dL    POCT Anion Gap, Venous 17.0 10.0 - 25.0 mmol/L    Patient Temperature 37.0 degrees Celsius    FiO2 21 %   Prepare RBC: 1 Units   Result Value Ref Range    PRODUCT CODE Y6840E86     Unit Number [id number]     Unit ABO O     Unit RH POS     XM INTEP COMP     Dispense Status TR     Blood Expiration Date March 14, 2024 23:59 EDT     PRODUCT BLOOD TYPE 5100     UNIT VOLUME 350    Prepare RBC: 1 Units   Result Value Ref Range    PRODUCT CODE N3694H81     Unit Number [id number]     Unit ABO O     Unit RH NEG     XM INTEP COMP     Dispense Status IS     Blood Expiration Date March 14, 2024 23:59 EDT     PRODUCT BLOOD TYPE 9500     UNIT VOLUME 350         No results found.   [unfilled]   Medication and Non-Pharmacologic VTE Prophylaxis/Anticoagulants   Last Anticoag Admin            No anticoagulants administered    No unadministered anticoagulant orders found.            Assessment/Plan     Soren Howard  has    Principal Problem:    Anemia, due to blood loss  Active Problems:    Anemia due to stage 4 chronic kidney disease (CMS/HCC)    ESRD    Seizure disorder       Transfused. Monitor GI Consult.      Dialysis   Other Hospital problems        Abnormal findings not addressed during hospitalization, but require out patient follow up. None        I spent 75 minutes taking history and examining  Soren Howard, reviewing the labs & imaging results, reviewing past hospital encounters,  speaking with & reviewing notes from emergency room physician.  SIGNATURE: Tawanda Pena MD PATIENT NAME: Soren Howard   DATE: March 10, 2024 MRN: [id number]   TIME: 2:34 PM

## 2024-03-10 NOTE — ED PROVIDER NOTES
HPI   Chief Complaint   Patient presents with    Low Hemoglobin       HPI: 48-year-old male arrives with a history of chronic renal failure last dialysis was Friday.  He was told at that time that his hemoglobin came back extremely low and he is to go to emergency he arrives today denying chest pain or shortness of breath denying melena or hematochezia he does have a significantly elevated BUN so at this time we have ordered stools to be guaiaced when he has BMs.  In addition he was ordered 2 units of blood with a hemoglobin of 5.7 his BUN and creatinine are so profoundly elevated that he will need dialysis after his transfusion.  I spoke to the nephrologist who will be on consult and he is planning dialysis after transfusion.  I spoke to internal medicine who was kind enough to admit under observation with a diagnosis of chronic renal failure and severe anemia.      PMH: Chronic renal failure    SH negative tobacco social alcohol  FH negative for both heart disease Diabetes  ROS  General Appears in distress positive mild weakness  HEENT: No sore throat, No Visual Loss, No Headache, No Ear Pain  Neck: Denies neck pain  Chest: No chest pain, no pleuritic pain, no chest wall injury  Pulmonary: No SOB, No Cough, No Sputum production, No Wheezing  GI: No abdominal pain, no nausea or vomiting, no diarrhea.  : No dysuria, no frequency, no hematuria.  Extremities: No musculoskeletal pain, normal ambulation, no paresthesia.  Psych: Normal interaction, no anxiety, no depression, no suicidal ideation  Skin: No rashes    ROS is otherwise negative    PE: General: Appears in distress        HEENT: Throat is moist without exudate, midline uvula dentate intact, Tms clear with normal anatomy.        Neck: Supple non tender        Chest CTA, good AE, no wheezing, rales, or rhonci        CVA: RRR S1S2 no S3S4 or murmur        ABD: W/S/NT no HSM, no pulsatile masses, good bowel sounds        Extremities: Excellent distal pulses,  brisk capillary refill. Full ROM        Psych: Normal interactions with no signs of depression  or suicidal ideation.        Neuro: Alert and oriented, moves all and feels all.    MDM:48-year-old male arrives with a history of chronic renal failure last dialysis was Friday.  He was told at that time that his hemoglobin came back extremely low and he is to go to emergency he arrives today denying chest pain or shortness of breath denying melena or hematochezia he does have a significantly elevated BUN so at this time we have ordered stools to be guaiaced when he has BMs.  In addition he was ordered 2 units of blood with a hemoglobin of 5.7 his BUN and creatinine are so profoundly elevated that he will need dialysis after his transfusion.  I spoke to the nephrologist who will be on consult and he is planning dialysis after transfusion.  I spoke to internal medicine who was kind enough to admit under observation with a diagnosis of chronic renal failure and severe anemia.                            No data recorded                   Patient History   Past Medical History:   Diagnosis Date    Angiodysplasia of stomach and duodenum without bleeding     Gastric AV malformation    COVID-19     COVID-19 virus infection    Diverticulosis of intestine, part unspecified, without perforation or abscess without bleeding     Diverticulosis    End stage renal disease (CMS/[facility]) 08/02/2021    ESRD (end stage renal disease)    Other hemorrhoids     Internal hemorrhoid    Personal history of diseases of the blood and blood-forming organs and certain disorders involving the immune mechanism     History of anemia    Personal history of other diseases of the circulatory system     History of hypertension    Personal history of other diseases of the musculoskeletal system and connective tissue     History of gout    Personal history of other endocrine, nutritional and metabolic disease     History of obesity    Personal history of other  medical treatment     History of blood transfusion    Residual hemorrhoidal skin tags     External hemorrhoid    Smoker     Ulcer of anus and rectum     Rectal ulcer    Unspecified osteoarthritis, unspecified site     Osteoarthritis     Past Surgical History:   Procedure Laterality Date    CT ABDOMEN PELVIS ANGIOGRAM W AND/OR WO IV CONTRAST  5/24/2023    CT ABDOMEN PELVIS ANGIOGRAM W AND/OR WO IV CONTRAST 5/24/2023 [facility] CT    IR VENOGRAM DIALYSIS  5/26/2023    IR VENOGRAM DIALYSIS 5/26/2023 [facility]    OTHER SURGICAL HISTORY  01/18/2022    Dialysis tunneled catheter placement    OTHER SURGICAL HISTORY  01/19/2022    Arteriovenous fistula creation procedure     No family history on file.  Social History     Tobacco Use    Smoking status: Never    Smokeless tobacco: Never   Substance Use Topics    Alcohol use: Not on file    Drug use: Not on file       Physical Exam   ED Triage Vitals [03/10/24 0533]   Temperature Heart Rate Respirations BP   36.7 °C (98.1 °F) 85 18 132/85      Pulse Ox Temp Source Heart Rate Source Patient Position   99 % Oral Monitor Sitting      BP Location FiO2 (%)     Right arm --       Physical Exam    ED Course & MDM   Diagnoses as of 03/10/24 0745   Anemia due to stage 4 chronic kidney disease (CMS/HCC)   Chronic renal failure, stage 5 (CMS/HCC)       Medical Decision Making      Procedure  Procedures     Balbir Chow MD  03/10/24 9008

## 2024-03-10 NOTE — NURSING NOTE
Patient discussed plan of care with Dr. AMELIA Pena right after arriving to room 616 from the emergency department. Patient decided he did not want to stay for treatment and chose to leave against medical advice. Patient signed AMA form. IV removed.

## 2024-03-12 LAB
BLOOD EXPIRATION DATE: NORMAL
DISPENSE STATUS: NORMAL
PRODUCT BLOOD TYPE: 9500
PRODUCT CODE: NORMAL
UNIT ABO: NORMAL
UNIT NUMBER: NORMAL
UNIT RH: NORMAL
UNIT VOLUME: 350
XM INTEP: NORMAL

## 2024-05-17 DIAGNOSIS — R56.9 SEIZURE (MULTI): ICD-10-CM

## 2024-05-17 RX ORDER — LEVETIRACETAM 500 MG/1
500 TABLET ORAL DAILY
Qty: 60 TABLET | Refills: 5 | Status: SHIPPED | OUTPATIENT
Start: 2024-05-17

## 2024-06-26 ENCOUNTER — TELEPHONE (OUTPATIENT)
Dept: TRANSPLANT | Facility: HOSPITAL | Age: 49
End: 2024-06-26
Payer: COMMERCIAL

## 2024-06-27 ENCOUNTER — TELEPHONE (OUTPATIENT)
Dept: TRANSPLANT | Facility: HOSPITAL | Age: 49
End: 2024-06-27
Payer: COMMERCIAL

## 2024-06-27 DIAGNOSIS — Z01.818 PRE-TRANSPLANT EVALUATION FOR KIDNEY TRANSPLANT: Primary | ICD-10-CM

## 2024-06-27 NOTE — TELEPHONE ENCOUNTER
Do you have difficulty reading or writing in English?   no   What is the primary cause of your kidney disease?   High blood pressure  Are you currently on dialysis?   yes  How many years have you been on dialysis?  2 yrs  If yes, what days do you have your dialysis treatments?   Mon,Wed,Fri  Have you received a transplant before?   no  If yes, what organ, and when and where was your transplant?   no  Have you been diagnosed with diabetes?    no  Have you tested positive for hepatitis or HIV?   no  Have you ever been diagnosed with cancer?   no  If yes, what type of cancer, and when and where were you treated?   no  Do you have a history of a heart attack or stroke?    No  Are you currently or have you previously been seen by a mental health professional?   no  If yes, what is the name of your mental health provider?   no  Are you a current or former tobacco user?   no  Do you have history of alcohol abuse or dependence?   no  Do you have a history of illegal drug abuse or dependence?   no  Has anyone told you they're willing to donate their kidney to you?   no  What is your blood type?  unknown  Comments:   Pt does not have a PCP,Neph-  Intake is done evaluation is scheduled for 10/22/24

## 2024-07-11 ENCOUNTER — HOSPITAL ENCOUNTER (OUTPATIENT)
Dept: CARDIOLOGY | Facility: HOSPITAL | Age: 49
Discharge: HOME | End: 2024-07-11
Payer: COMMERCIAL

## 2024-07-11 ENCOUNTER — HOSPITAL ENCOUNTER (OUTPATIENT)
Facility: HOSPITAL | Age: 49
Setting detail: OBSERVATION
Discharge: LEFT BEFORE DISCHARGE | End: 2024-07-12
Attending: GENERAL PRACTICE | Admitting: INTERNAL MEDICINE
Payer: COMMERCIAL

## 2024-07-11 ENCOUNTER — APPOINTMENT (OUTPATIENT)
Dept: RADIOLOGY | Facility: HOSPITAL | Age: 49
End: 2024-07-11
Payer: COMMERCIAL

## 2024-07-11 ENCOUNTER — APPOINTMENT (OUTPATIENT)
Dept: CARDIOLOGY | Facility: HOSPITAL | Age: 49
End: 2024-07-11
Payer: COMMERCIAL

## 2024-07-11 DIAGNOSIS — D64.9 ANEMIA REQUIRING TRANSFUSIONS: Primary | ICD-10-CM

## 2024-07-11 LAB
ABO GROUP (TYPE) IN BLOOD: NORMAL
ALBUMIN SERPL BCP-MCNC: 3.4 G/DL (ref 3.4–5)
ALP SERPL-CCNC: 69 U/L (ref 33–120)
ALT SERPL W P-5'-P-CCNC: 7 U/L (ref 10–52)
ANION GAP SERPL CALC-SCNC: 25 MMOL/L (ref 10–20)
ANTIBODY SCREEN: NORMAL
AST SERPL W P-5'-P-CCNC: 9 U/L (ref 9–39)
BASOPHILS # BLD AUTO: 0.04 X10*3/UL (ref 0–0.1)
BASOPHILS NFR BLD AUTO: 0.5 %
BILIRUB SERPL-MCNC: 0.3 MG/DL (ref 0–1.2)
BNP SERPL-MCNC: 710 PG/ML (ref 0–99)
BUN SERPL-MCNC: 85 MG/DL (ref 6–23)
CALCIUM SERPL-MCNC: 6.7 MG/DL (ref 8.6–10.3)
CARDIAC TROPONIN I PNL SERPL HS: 72 NG/L (ref 0–20)
CARDIAC TROPONIN I PNL SERPL HS: 73 NG/L (ref 0–20)
CHLORIDE SERPL-SCNC: 102 MMOL/L (ref 98–107)
CO2 SERPL-SCNC: 17 MMOL/L (ref 21–32)
CREAT SERPL-MCNC: 24.98 MG/DL (ref 0.5–1.3)
EGFRCR SERPLBLD CKD-EPI 2021: 2 ML/MIN/1.73M*2
EOSINOPHIL # BLD AUTO: 0.37 X10*3/UL (ref 0–0.7)
EOSINOPHIL NFR BLD AUTO: 4.5 %
ERYTHROCYTE [DISTWIDTH] IN BLOOD BY AUTOMATED COUNT: 15.7 % (ref 11.5–14.5)
GLUCOSE SERPL-MCNC: 82 MG/DL (ref 74–99)
HCT VFR BLD AUTO: 16.2 % (ref 41–52)
HGB BLD-MCNC: 5.2 G/DL (ref 13.5–17.5)
IMM GRANULOCYTES # BLD AUTO: 0.09 X10*3/UL (ref 0–0.7)
IMM GRANULOCYTES NFR BLD AUTO: 1.1 % (ref 0–0.9)
LYMPHOCYTES # BLD AUTO: 1.08 X10*3/UL (ref 1.2–4.8)
LYMPHOCYTES NFR BLD AUTO: 13.2 %
MAGNESIUM SERPL-MCNC: 2 MG/DL (ref 1.6–2.4)
MCH RBC QN AUTO: 28.4 PG (ref 26–34)
MCHC RBC AUTO-ENTMCNC: 32.1 G/DL (ref 32–36)
MCV RBC AUTO: 89 FL (ref 80–100)
MONOCYTES # BLD AUTO: 0.95 X10*3/UL (ref 0.1–1)
MONOCYTES NFR BLD AUTO: 11.6 %
NEUTROPHILS # BLD AUTO: 5.66 X10*3/UL (ref 1.2–7.7)
NEUTROPHILS NFR BLD AUTO: 69.1 %
NRBC BLD-RTO: 0.4 /100 WBCS (ref 0–0)
PLATELET # BLD AUTO: 227 X10*3/UL (ref 150–450)
POTASSIUM SERPL-SCNC: 4.5 MMOL/L (ref 3.5–5.3)
PROT SERPL-MCNC: 6.2 G/DL (ref 6.4–8.2)
RBC # BLD AUTO: 1.83 X10*6/UL (ref 4.5–5.9)
RH FACTOR (ANTIGEN D): NORMAL
SODIUM SERPL-SCNC: 139 MMOL/L (ref 136–145)
WBC # BLD AUTO: 8.2 X10*3/UL (ref 4.4–11.3)

## 2024-07-11 PROCEDURE — 85025 COMPLETE CBC W/AUTO DIFF WBC: CPT | Performed by: NURSE PRACTITIONER

## 2024-07-11 PROCEDURE — 93005 ELECTROCARDIOGRAM TRACING: CPT | Mod: 59

## 2024-07-11 PROCEDURE — 71046 X-RAY EXAM CHEST 2 VIEWS: CPT | Performed by: RADIOLOGY

## 2024-07-11 PROCEDURE — 36415 COLL VENOUS BLD VENIPUNCTURE: CPT

## 2024-07-11 PROCEDURE — 71046 X-RAY EXAM CHEST 2 VIEWS: CPT

## 2024-07-11 PROCEDURE — 83735 ASSAY OF MAGNESIUM: CPT | Performed by: NURSE PRACTITIONER

## 2024-07-11 PROCEDURE — 86901 BLOOD TYPING SEROLOGIC RH(D): CPT

## 2024-07-11 PROCEDURE — 80053 COMPREHEN METABOLIC PANEL: CPT | Performed by: NURSE PRACTITIONER

## 2024-07-11 PROCEDURE — 83880 ASSAY OF NATRIURETIC PEPTIDE: CPT | Performed by: NURSE PRACTITIONER

## 2024-07-11 PROCEDURE — 84484 ASSAY OF TROPONIN QUANT: CPT | Mod: 91 | Performed by: NURSE PRACTITIONER

## 2024-07-11 PROCEDURE — 86920 COMPATIBILITY TEST SPIN: CPT | Mod: 59

## 2024-07-11 PROCEDURE — 84484 ASSAY OF TROPONIN QUANT: CPT | Performed by: NURSE PRACTITIONER

## 2024-07-11 PROCEDURE — 99285 EMERGENCY DEPT VISIT HI MDM: CPT | Mod: 25

## 2024-07-11 PROCEDURE — 93005 ELECTROCARDIOGRAM TRACING: CPT

## 2024-07-11 PROCEDURE — 36430 TRANSFUSION BLD/BLD COMPNT: CPT

## 2024-07-11 PROCEDURE — P9040 RBC LEUKOREDUCED IRRADIATED: HCPCS

## 2024-07-11 ASSESSMENT — COLUMBIA-SUICIDE SEVERITY RATING SCALE - C-SSRS
6. HAVE YOU EVER DONE ANYTHING, STARTED TO DO ANYTHING, OR PREPARED TO DO ANYTHING TO END YOUR LIFE?: NO
2. HAVE YOU ACTUALLY HAD ANY THOUGHTS OF KILLING YOURSELF?: NO
1. IN THE PAST MONTH, HAVE YOU WISHED YOU WERE DEAD OR WISHED YOU COULD GO TO SLEEP AND NOT WAKE UP?: NO

## 2024-07-11 ASSESSMENT — PAIN DESCRIPTION - ONSET: ONSET: ONGOING

## 2024-07-11 ASSESSMENT — PAIN DESCRIPTION - ORIENTATION: ORIENTATION: LEFT

## 2024-07-11 ASSESSMENT — PAIN - FUNCTIONAL ASSESSMENT: PAIN_FUNCTIONAL_ASSESSMENT: 0-10

## 2024-07-11 ASSESSMENT — PAIN DESCRIPTION - LOCATION: LOCATION: CHEST

## 2024-07-11 ASSESSMENT — PAIN SCALES - GENERAL
PAINLEVEL_OUTOF10: 5 - MODERATE PAIN
PAINLEVEL_OUTOF10: 5 - MODERATE PAIN

## 2024-07-11 ASSESSMENT — PAIN DESCRIPTION - PROGRESSION: CLINICAL_PROGRESSION: NOT CHANGED

## 2024-07-11 ASSESSMENT — PAIN DESCRIPTION - FREQUENCY: FREQUENCY: CONSTANT/CONTINUOUS

## 2024-07-11 ASSESSMENT — PAIN DESCRIPTION - PAIN TYPE: TYPE: ACUTE PAIN

## 2024-07-11 ASSESSMENT — PAIN DESCRIPTION - DESCRIPTORS: DESCRIPTORS: THROBBING

## 2024-07-11 NOTE — ED TRIAGE NOTES
Pt with c/o non-radiating left sided chest pain started this morning while lying down. He states dialysis center also called him today with Hgb 4.0. pt reports SOB with exertion. PMHx ESRD (MWF)

## 2024-07-11 NOTE — ED TRIAGE NOTES
As provider-in-triage, I performed a medical screening history and physical exam on this patient.  HISTORY OF PRESENT ILLNESS  (include at least one item)    48 yo AA male with PMHx ESRD on dialysis MWF last dialysis yesterday with complaints of left upper chest pain that started today while lying in bed. Pain is constant, no relieving or worsening factors. He endorses associated dyspnea with exertion, and lower extremity edema. He reports the dialysis center called and told him his Hgb was 4.0 earlier today. Denies hx of CAD. Recent hospitalization 3/28/24 Pulmonary Edema, ESRD, Resp Failure, and elevated troponin.     PHYSICAL EXAM  Vital Signs reviewed.  (include at least one additional item)    He is sitting without distress  Chronically ill appearing  +1 pitting edema BLE  BILLY Fistula with bruit and thrill  Heart Murmur noted        MDM  (describe briefly what was initiated or planned)    Chest pain work up to rule out ACS   Labs to include CBC to evaluate anemia 4.0 likely some anemia due to chronic disease. If less than 6 will likely need transfusion.  CMP, Troponin, Mg  CXR to evaluate for potential pulmonary edema or pneumonia        I evaluated this patient in triage with the RN. Due to the patients complaint labs and or imaging were ordered by myself in an attempt to expedite patient care however I am not participating in care after evaluation. This is a preliminary assessment. Pt does not appear in acute distress at this time. They will have a full evaluation as soon as possible. They will be cared for by another provider who will possibly order more labs, imaging or interventions. Pt did not have a full ROS or PE completed by myself however below is a summary with reasons for orders.  For the remainder of the patient's workup and ED course, please refer to the main ED provider note. We discussed need for diagnostic testing including laboratory studies and imaging.  We also discussed that patient may be  asked to wait in the waiting room while these tests are pending.  They understand that if they choose to leave without having the testing completed or resulted that we cannot rule out acute life threatening illnesses and the risks involved could lead to worsening condition, permanent disability or even death.

## 2024-07-12 VITALS
TEMPERATURE: 98.5 F | BODY MASS INDEX: 32.94 KG/M2 | DIASTOLIC BLOOD PRESSURE: 101 MMHG | WEIGHT: 209.88 LBS | RESPIRATION RATE: 25 BRPM | SYSTOLIC BLOOD PRESSURE: 152 MMHG | HEART RATE: 85 BPM | HEIGHT: 67 IN | OXYGEN SATURATION: 94 %

## 2024-07-12 PROBLEM — D64.9 ANEMIA REQUIRING TRANSFUSIONS: Status: ACTIVE | Noted: 2024-07-12

## 2024-07-12 LAB
BLOOD EXPIRATION DATE: NORMAL
BLOOD EXPIRATION DATE: NORMAL
DISPENSE STATUS: NORMAL
DISPENSE STATUS: NORMAL
PRODUCT BLOOD TYPE: 5100
PRODUCT BLOOD TYPE: 5100
PRODUCT CODE: NORMAL
PRODUCT CODE: NORMAL
UNIT ABO: NORMAL
UNIT ABO: NORMAL
UNIT NUMBER: NORMAL
UNIT NUMBER: NORMAL
UNIT RH: NORMAL
UNIT RH: NORMAL
UNIT VOLUME: 289
UNIT VOLUME: 350
XM INTEP: NORMAL
XM INTEP: NORMAL

## 2024-07-12 PROCEDURE — 99222 1ST HOSP IP/OBS MODERATE 55: CPT | Performed by: INTERNAL MEDICINE

## 2024-07-12 PROCEDURE — G0378 HOSPITAL OBSERVATION PER HR: HCPCS

## 2024-07-12 PROCEDURE — P9016 RBC LEUKOCYTES REDUCED: HCPCS

## 2024-07-12 PROCEDURE — 36430 TRANSFUSION BLD/BLD COMPNT: CPT

## 2024-07-12 PROCEDURE — 2500000001 HC RX 250 WO HCPCS SELF ADMINISTERED DRUGS (ALT 637 FOR MEDICARE OP): Performed by: INTERNAL MEDICINE

## 2024-07-12 RX ORDER — LOSARTAN POTASSIUM 50 MG/1
25 TABLET ORAL DAILY
Status: DISCONTINUED | OUTPATIENT
Start: 2024-07-12 | End: 2024-07-12 | Stop reason: HOSPADM

## 2024-07-12 RX ORDER — COLCHICINE 0.6 MG/1
0.6 TABLET ORAL DAILY
Status: DISCONTINUED | OUTPATIENT
Start: 2024-07-12 | End: 2024-07-12 | Stop reason: HOSPADM

## 2024-07-12 RX ORDER — ACETAMINOPHEN 650 MG/1
650 SUPPOSITORY RECTAL EVERY 4 HOURS PRN
Status: DISCONTINUED | OUTPATIENT
Start: 2024-07-12 | End: 2024-07-12 | Stop reason: HOSPADM

## 2024-07-12 RX ORDER — ALLOPURINOL 100 MG/1
100 TABLET ORAL DAILY
Status: DISCONTINUED | OUTPATIENT
Start: 2024-07-12 | End: 2024-07-12 | Stop reason: HOSPADM

## 2024-07-12 RX ORDER — CALCIUM ACETATE 667 MG/1
2000 CAPSULE ORAL
Status: DISCONTINUED | OUTPATIENT
Start: 2024-07-12 | End: 2024-07-12 | Stop reason: HOSPADM

## 2024-07-12 RX ORDER — ACETAMINOPHEN 160 MG/5ML
650 SOLUTION ORAL EVERY 4 HOURS PRN
Status: DISCONTINUED | OUTPATIENT
Start: 2024-07-12 | End: 2024-07-12 | Stop reason: HOSPADM

## 2024-07-12 RX ORDER — LEVETIRACETAM 500 MG/1
500 TABLET ORAL DAILY
Status: DISCONTINUED | OUTPATIENT
Start: 2024-07-12 | End: 2024-07-12 | Stop reason: HOSPADM

## 2024-07-12 RX ORDER — AMLODIPINE BESYLATE 10 MG/1
10 TABLET ORAL DAILY
Status: DISCONTINUED | OUTPATIENT
Start: 2024-07-12 | End: 2024-07-12 | Stop reason: HOSPADM

## 2024-07-12 RX ORDER — ACETAMINOPHEN 325 MG/1
650 TABLET ORAL EVERY 4 HOURS PRN
Status: DISCONTINUED | OUTPATIENT
Start: 2024-07-12 | End: 2024-07-12 | Stop reason: HOSPADM

## 2024-07-12 RX ORDER — ONDANSETRON 4 MG/1
4 TABLET, FILM COATED ORAL EVERY 8 HOURS PRN
Status: DISCONTINUED | OUTPATIENT
Start: 2024-07-12 | End: 2024-07-12 | Stop reason: HOSPADM

## 2024-07-12 RX ORDER — CARVEDILOL 12.5 MG/1
25 TABLET ORAL EVERY 12 HOURS
Status: DISCONTINUED | OUTPATIENT
Start: 2024-07-12 | End: 2024-07-12 | Stop reason: HOSPADM

## 2024-07-12 RX ORDER — ONDANSETRON HYDROCHLORIDE 2 MG/ML
4 INJECTION, SOLUTION INTRAVENOUS EVERY 8 HOURS PRN
Status: DISCONTINUED | OUTPATIENT
Start: 2024-07-12 | End: 2024-07-12 | Stop reason: HOSPADM

## 2024-07-12 RX ORDER — PANTOPRAZOLE SODIUM 40 MG/10ML
40 INJECTION, POWDER, LYOPHILIZED, FOR SOLUTION INTRAVENOUS
Status: DISCONTINUED | OUTPATIENT
Start: 2024-07-12 | End: 2024-07-12 | Stop reason: HOSPADM

## 2024-07-12 RX ORDER — CHOLECALCIFEROL (VITAMIN D3) 25 MCG
2000 TABLET ORAL DAILY
Status: DISCONTINUED | OUTPATIENT
Start: 2024-07-12 | End: 2024-07-12 | Stop reason: HOSPADM

## 2024-07-12 RX ORDER — ATORVASTATIN CALCIUM 80 MG/1
80 TABLET, FILM COATED ORAL NIGHTLY
Status: DISCONTINUED | OUTPATIENT
Start: 2024-07-12 | End: 2024-07-12 | Stop reason: HOSPADM

## 2024-07-12 RX ORDER — HYDRALAZINE HYDROCHLORIDE 50 MG/1
100 TABLET, FILM COATED ORAL 2 TIMES DAILY
Status: DISCONTINUED | OUTPATIENT
Start: 2024-07-12 | End: 2024-07-12 | Stop reason: HOSPADM

## 2024-07-12 ASSESSMENT — ENCOUNTER SYMPTOMS
MUSCULOSKELETAL NEGATIVE: 1
APPETITE CHANGE: 1
HEMATOLOGIC/LYMPHATIC NEGATIVE: 1
ENDOCRINE NEGATIVE: 1
NEUROLOGICAL NEGATIVE: 1
EYES NEGATIVE: 1
FATIGUE: 1
GASTROINTESTINAL NEGATIVE: 1
PSYCHIATRIC NEGATIVE: 1
ACTIVITY CHANGE: 1
ALLERGIC/IMMUNOLOGIC NEGATIVE: 1
SHORTNESS OF BREATH: 1

## 2024-07-12 NOTE — H&P
History Of Present Illness  Soren Howard is a 49 y.o. male with a past medical history of end-stage renal disease on hemodialysis Monday Wednesday Friday complaining of left upper quadrant chest pain that started yesterday while he was lying in bed.  The pain in his chest is constant nothing really seems to make it better or worse.  He endorses shortness of breath especially with exertion and lower extremity edema.  He reported to the dialysis center and was told that his hemoglobin is very low and he is receiving 2 units of packed red blood cells in the emergency department.  He does admit to having some maroon stool although he has chronic anemia related to his ESRD     Past Medical History  Past Medical History:   Diagnosis Date    Angiodysplasia of stomach and duodenum without bleeding     Gastric AV malformation    COVID-19     COVID-19 virus infection    Diverticulosis of intestine, part unspecified, without perforation or abscess without bleeding     Diverticulosis    End stage renal disease (Multi) 08/02/2021    ESRD (end stage renal disease)    Other hemorrhoids     Internal hemorrhoid    Personal history of diseases of the blood and blood-forming organs and certain disorders involving the immune mechanism     History of anemia    Personal history of other diseases of the circulatory system     History of hypertension    Personal history of other diseases of the musculoskeletal system and connective tissue     History of gout    Personal history of other endocrine, nutritional and metabolic disease     History of obesity    Personal history of other medical treatment     History of blood transfusion    Residual hemorrhoidal skin tags     External hemorrhoid    Smoker     Ulcer of anus and rectum     Rectal ulcer    Unspecified osteoarthritis, unspecified site     Osteoarthritis        Surgical History  Past Surgical History:   Procedure Laterality Date    CT ABDOMEN PELVIS ANGIOGRAM W AND/OR WO IV CONTRAST   5/24/2023    CT ABDOMEN PELVIS ANGIOGRAM W AND/OR WO IV CONTRAST 5/24/2023 [facility] CT    IR VENOGRAM DIALYSIS  5/26/2023    IR VENOGRAM DIALYSIS 5/26/2023 [facility]IO    OTHER SURGICAL HISTORY  01/18/2022    Dialysis tunneled catheter placement    OTHER SURGICAL HISTORY  01/19/2022    Arteriovenous fistula creation procedure         Social History  He reports that he has never smoked. He has never used smokeless tobacco. He reports current alcohol use. He reports that he does not use drugs.    Family History  No family history on file.     Allergies  Piperacillin-tazobactam-dextrs    Review of Systems   Constitutional:  Positive for activity change, appetite change and fatigue.   HENT: Negative.     Eyes: Negative.    Respiratory:  Positive for shortness of breath.    Cardiovascular:  Positive for chest pain.   Gastrointestinal: Negative.         Maroon-colored stool   Endocrine: Negative.    Genitourinary: Negative.    Musculoskeletal: Negative.    Skin: Negative.    Allergic/Immunologic: Negative.    Neurological: Negative.    Hematological: Negative.    Psychiatric/Behavioral: Negative.     All other systems reviewed and are negative.     Physical Exam  Vitals and nursing note reviewed.   Constitutional:       Appearance: Normal appearance. He is obese.   HENT:      Head: Normocephalic.      Right Ear: External ear normal.      Left Ear: External ear normal.      Nose: Nose normal.      Mouth/Throat:      Mouth: Mucous membranes are dry.      Pharynx: Oropharynx is clear.   Eyes:      Extraocular Movements: Extraocular movements intact.      Conjunctiva/sclera: Conjunctivae normal.      Pupils: Pupils are equal, round, and reactive to light.   Cardiovascular:      Rate and Rhythm: Normal rate and regular rhythm.   Pulmonary:      Effort: Pulmonary effort is normal.      Breath sounds: Normal breath sounds.   Abdominal:      General: Abdomen is flat. Bowel sounds are normal.      Palpations: Abdomen is soft.  "  Musculoskeletal:         General: Normal range of motion.   Skin:     General: Skin is warm and dry.   Neurological:      General: No focal deficit present.      Mental Status: He is alert. Mental status is at baseline.   Psychiatric:         Mood and Affect: Mood normal.         Behavior: Behavior normal.          Last Recorded Vitals  Blood pressure (!) 157/100, pulse 80, temperature 37 °C (98.6 °F), resp. rate (!) 22, height 1.71 m (5' 7.32\"), weight 95.2 kg (209 lb 14.1 oz), SpO2 96%.    Relevant Results  Meds:  Scheduled medications  allopurinol, 100 mg, oral, Daily  amLODIPine, 10 mg, oral, Daily  atorvastatin, 80 mg, oral, Nightly  calcium acetate, 2,000 mg, oral, TID  carvedilol, 25 mg, oral, q12h  cholecalciferol, 2,000 Units, oral, Daily  colchicine, 0.6 mg, oral, Daily  hydrALAZINE, 100 mg, oral, BID  levETIRAcetam, 500 mg, oral, Daily  losartan, 25 mg, oral, Daily  pantoprazole, 40 mg, intravenous, BID AC      Continuous medications     PRN medications  PRN medications: acetaminophen **OR** acetaminophen **OR** acetaminophen, ondansetron **OR** ondansetron   Current Outpatient Medications   Medication Instructions    allopurinol (Zyloprim) 100 mg tablet 1 tablet, oral, Daily    amLODIPine (Norvasc) 10 mg tablet 1 tablet, oral, Daily    atorvastatin (Lipitor) 80 mg tablet 1 tablet, oral, Nightly    calcium acetate (PHOSLO) 2,000 mg, oral, 3 times daily (morning, midday, late afternoon)    carvedilol (COREG) 25 mg, oral, Every 12 hours    cholecalciferol (VITAMIN D-3) 50 mcg, oral, Daily    colchicine 0.6 mg, oral, Daily PRN    hydrALAZINE (APRESOLINE) 100 mg, oral, 2 times daily    levETIRAcetam (KEPPRA) 500 mg, oral, Daily, And one additional tablet after dialysis    lidocaine-prilocaine (Emla) 2.5-2.5 % cream Apply thick layer 2 hours prior to dialysis site, cover with plastic wrap.    losartan (COZAAR) 25 mg, oral, Daily        Labs:  Results for orders placed or performed during the hospital " encounter of 07/11/24 (from the past 24 hour(s))   CBC and Auto Differential   Result Value Ref Range    WBC 8.2 4.4 - 11.3 x10*3/uL    nRBC 0.4 (H) 0.0 - 0.0 /100 WBCs    RBC 1.83 (L) 4.50 - 5.90 x10*6/uL    Hemoglobin 5.2 (LL) 13.5 - 17.5 g/dL    Hematocrit 16.2 (L) 41.0 - 52.0 %    MCV 89 80 - 100 fL    MCH 28.4 26.0 - 34.0 pg    MCHC 32.1 32.0 - 36.0 g/dL    RDW 15.7 (H) 11.5 - 14.5 %    Platelets 227 150 - 450 x10*3/uL    Neutrophils % 69.1 40.0 - 80.0 %    Immature Granulocytes %, Automated 1.1 (H) 0.0 - 0.9 %    Lymphocytes % 13.2 13.0 - 44.0 %    Monocytes % 11.6 2.0 - 10.0 %    Eosinophils % 4.5 0.0 - 6.0 %    Basophils % 0.5 0.0 - 2.0 %    Neutrophils Absolute 5.66 1.20 - 7.70 x10*3/uL    Immature Granulocytes Absolute, Automated 0.09 0.00 - 0.70 x10*3/uL    Lymphocytes Absolute 1.08 (L) 1.20 - 4.80 x10*3/uL    Monocytes Absolute 0.95 0.10 - 1.00 x10*3/uL    Eosinophils Absolute 0.37 0.00 - 0.70 x10*3/uL    Basophils Absolute 0.04 0.00 - 0.10 x10*3/uL   Comprehensive Metabolic Panel   Result Value Ref Range    Glucose 82 74 - 99 mg/dL    Sodium 139 136 - 145 mmol/L    Potassium 4.5 3.5 - 5.3 mmol/L    Chloride 102 98 - 107 mmol/L    Bicarbonate 17 (L) 21 - 32 mmol/L    Anion Gap 25 (H) 10 - 20 mmol/L    Urea Nitrogen 85 (H) 6 - 23 mg/dL    Creatinine 24.98 (H) 0.50 - 1.30 mg/dL    eGFR 2 (L) >60 mL/min/1.73m*2    Calcium 6.7 (L) 8.6 - 10.3 mg/dL    Albumin 3.4 3.4 - 5.0 g/dL    Alkaline Phosphatase 69 33 - 120 U/L    Total Protein 6.2 (L) 6.4 - 8.2 g/dL    AST 9 9 - 39 U/L    Bilirubin, Total 0.3 0.0 - 1.2 mg/dL    ALT 7 (L) 10 - 52 U/L   Magnesium   Result Value Ref Range    Magnesium 2.00 1.60 - 2.40 mg/dL   Troponin I, High Sensitivity, Initial   Result Value Ref Range    Troponin I, High Sensitivity 72 (HH) 0 - 20 ng/L   B-Type Natriuretic Peptide   Result Value Ref Range     (H) 0 - 99 pg/mL   Troponin, High Sensitivity, 1 Hour   Result Value Ref Range    Troponin I, High Sensitivity 73 (HH) 0  - 20 ng/L   Type And Screen   Result Value Ref Range    ABO TYPE O     Rh TYPE POS     ANTIBODY SCREEN NEG    Prepare RBC: 2 Units   Result Value Ref Range    PRODUCT CODE Y2362X51     Unit Number [id number]     Unit ABO O     Unit RH POS     XM INTEP COMP     Dispense Status IS     Blood Expiration Date 7/26/2024 11:59:00 PM EDT     PRODUCT BLOOD TYPE 5100     UNIT VOLUME 350     PRODUCT CODE L0831Y69     Unit Number [id number]     Unit ABO O     Unit RH POS     XM INTEP COMP     Dispense Status XM     Blood Expiration Date 8/14/2024 11:59:00 PM EDT     PRODUCT BLOOD TYPE 5100     UNIT VOLUME 289       Imaging:  XR chest 2 views    Result Date: 7/11/2024  Interpreted By:  Sofia Rangel, STUDY: Chest, 2 views.   INDICATION: Signs/Symptoms:chest pain.   COMPARISON: 02/05/2024.   ACCESSION NUMBER(S): [id number]   ORDERING CLINICIAN: MILKA OROPEZA   FINDINGS: The cardiomediastinal silhouette size is within normal limits. No pneumothorax. Large right pleural effusion and small left pleural effusion noted. Hazy opacities in the perihilar regions likely representing pulmonary edema. Correlate with history and concern for fluid overload.   Superimposed consolidation of the right lung not excluded although less likely.       1. As above.   MACRO: None.   Signed by: Sofia Rangel 7/11/2024 7:50 PM Dictation workstation:   [id number]      Assessment/Plan   Chest pain with elevated troponin, flat trend no ACS  Plan:  Telemetry  Cardiology    Hypertensive urgency  Plan:  Continue Coreg 25 mg twice a day  Amlodipine 10 mg daily  Hydralazine 100 mg twice a day  Losartan 25 mg daily    Acute blood loss anemia superimposed on anemia of chronic disease  Plan:  Transfuse to keep hemoglobin greater than 7.0 he is receiving 2 units of packed red blood cells in the ER  Protonix 40 mg IV every 12 hours  GI consultation    End-stage renal disease, hemodialysis every Monday Wednesday  Friday  Plan:  Nephrology    Hyperlipidemia  High intensity atorvastatin 80 mg orally daily    DVT prophylaxis  No subcu heparin as he has suspected GI blood loss  SCDs    I spent 60 minutes in the professional and overall care of this patient.      Andry Guzman DO

## 2024-07-12 NOTE — ED NOTES
RN made patient aware that he had a bed assigned and that he will be going up to the floor soon. Pt then stated that he didn't want to be admitted and that he wanted to go home. RN had discussion on the risks of leaving and that he should stay to find out more of what causing his symptoms. Pt refused to be admitted and called his daughter to come and pick him up. RN talked to wife on phone who then called patient on the phone with the same result and patient chooses to leave AMA. MD aware. AMA paperwork filled out.   Pt aware of the risks of leaving and still chooses to leave AMA even after multiple attempts to persuade patient to stay by RN and MD Fanta Olivares RN  07/12/24 5746

## 2024-07-14 LAB
ATRIAL RATE: 86 BPM
PR INTERVAL: 256 MS
Q ONSET: 207 MS
QRS COUNT: 15 BEATS
QRS DURATION: 114 MS
QT INTERVAL: 402 MS
QTC CALCULATION(BAZETT): 481 MS
QTC FREDERICIA: 453 MS
R AXIS: -12 DEGREES
T AXIS: 101 DEGREES
T OFFSET: 408 MS
VENTRICULAR RATE: 86 BPM

## 2024-07-17 NOTE — ED PROVIDER NOTES
HPI   Chief Complaint   Patient presents with    Chest Pain    Shortness of Breath    Abnormal Lab       HPI: 49-year-old male with a history of ESRD on HD presents for reported anemia.  The patient underwent blood work as an outpatient and was told that he was significantly anemic.  He has required blood transfusions in the past.  He reports maroon stools over the past several weeks which he does have a history of.  He denies abdominal pain, chest pain, shortness of breath and reports feeling generally fatigued      Limitations to history: None  Independent Historians: Patient  External Records Reviewed: [facility], outpatient notes, inpatient notes  ------------------------------------------------------------------------------------------------------------------------------------------  ROS: a ten point review of systems was performed and was negative except as per HPI.  ------------------------------------------------------------------------------------------------------------------------------------------  PMH / PSH: as per HPI, otherwise reviewed in EMR  MEDS: as per HPI, otherwise reviewed in EMR  ALLERGIES: as per HPI, otherwise reviewed in EMR  SocH:  as per HPI, otherwise reviewed in EMR  FH:  as per HPI, otherwise reviewed in EMR  ------------------------------------------------------------------------------------------------------------------------------------------  Physical Exam:  VS: As documented in the triage note and EMR flowsheet from this visit was reviewed  General: Well appearing. No acute distress.   Eyes:  Extraocular movements grossly intact. No scleral icterus. No discharge  HEENT:  Normocephalic.  Atraumatic  Neck: Moves neck freely. No gross masses  CV: Regular rhythm. No murmurs, rubs or gallops   Resp: Clear to auscultation bilaterally. No respiratory distress.    GI: Soft, no masses, nontender. No rebound tenderness or guarding  MSK: Symmetric muscle bulk. No deformities.   Skin: Warm, dry,  intact.   Neuro: No focal deficits.  A&O x3.   Psych: Appropriate for situation  ------------------------------------------------------------------------------------------------------------------------------------------  Hospital Course / Medical Decision Making:  Independent Interpretations: Chest x-ray  EKG as interpreted by me: Normal sinus rhythm at 86 bpm with a first-degree AV block with a SD interval 256 ms with no signs of acute ischemia    MDM: 49-year-old male with a history of ESRD on HD presents for reported anemia and maroon stools.  He is in no distress in the ED.  He is mildly hypertensive.  Hemoglobin resulted at 5.2.  Potassium is within normal limits.  Renal function is reflective of his end-stage renal disease.  He reports that he is compliant with dialysis.  He was consented for blood transfusion and 2 units of packed red blood cells were ordered.  The transfusion was completed in the ED.  I explained to the patient that he should be admitted for further evaluation of his anemia and possible GI bleed.  The patient originally agreed to admission but later stated that he wanted to leave and follow-up with his primary care physician.  I explained to the patient that failure to evaluate his possible GI bleed and anemia could lead to hypoxia, cardiac arrhythmia and possible death.  He is alert and oriented and displaying full decision-making capacity.  I explained that he can return for evaluation at any time.  The patient signed out AGAINST MEDICAL ADVICE.    Discussion of Management with Other Providers:   I discussed the patient/results with: Emergency medicine team    Final diagnosis and disposition as below.    Results for orders placed or performed during the hospital encounter of 07/11/24  -CBC and Auto Differential:        Result                      Value             Ref Range           WBC                         8.2               4.4 - 11.3 x*       nRBC                        0.4 (H)            0.0 - 0.0 /1*       RBC                         1.83 (L)          4.50 - 5.90 *       Hemoglobin                  5.2 (LL)          13.5 - 17.5 *       Hematocrit                  16.2 (L)          41.0 - 52.0 %       MCV                         89                80 - 100 fL         MCH                         28.4              26.0 - 34.0 *       MCHC                        32.1              32.0 - 36.0 *       RDW                         15.7 (H)          11.5 - 14.5 %       Platelets                   227               150 - 450 x1*       Neutrophils %               69.1              40.0 - 80.0 %       Immature Granulocytes *     1.1 (H)           0.0 - 0.9 %         Lymphocytes %               13.2              13.0 - 44.0 %       Monocytes %                 11.6              2.0 - 10.0 %        Eosinophils %               4.5               0.0 - 6.0 %         Basophils %                 0.5               0.0 - 2.0 %         Neutrophils Absolute        5.66              1.20 - 7.70 *       Immature Granulocytes *     0.09              0.00 - 0.70 *       Lymphocytes Absolute        1.08 (L)          1.20 - 4.80 *       Monocytes Absolute          0.95              0.10 - 1.00 *       Eosinophils Absolute        0.37              0.00 - 0.70 *       Basophils Absolute          0.04              0.00 - 0.10 *  -Comprehensive Metabolic Panel:        Result                      Value             Ref Range           Glucose                     82                74 - 99 mg/dL       Sodium                      139               136 - 145 mm*       Potassium                   4.5               3.5 - 5.3 mm*       Chloride                    102               98 - 107 mmo*       Bicarbonate                 17 (L)            21 - 32 mmol*       Anion Gap                   25 (H)            10 - 20 mmol*       Urea Nitrogen               85 (H)            6 - 23 mg/dL        Creatinine                  24.98 (H)          0.50 - 1.30 *       eGFR                        2 (L)             >60 mL/min/1*       Calcium                     6.7 (L)           8.6 - 10.3 m*       Albumin                     3.4               3.4 - 5.0 g/*       Alkaline Phosphatase        69                33 - 120 U/L        Total Protein               6.2 (L)           6.4 - 8.2 g/*       AST                         9                 9 - 39 U/L          Bilirubin, Total            0.3               0.0 - 1.2 mg*       ALT                         7 (L)             10 - 52 U/L    -Magnesium:        Result                      Value             Ref Range           Magnesium                   2.00              1.60 - 2.40 *  -Troponin I, High Sensitivity, Initial:        Result                      Value             Ref Range           Troponin I, High Sensi*     72 (HH)           0 - 20 ng/L    -Troponin, High Sensitivity, 1 Hour:        Result                      Value             Ref Range           Troponin I, High Sensi*     73 (HH)           0 - 20 ng/L    -Type And Screen:        Result                      Value             Ref Range           ABO TYPE                    O                                     Rh TYPE                     POS                                   ANTIBODY SCREEN             NEG                              -B-Type Natriuretic Peptide:        Result                      Value             Ref Range           BNP                         710 (H)           0 - 99 pg/mL   -ECG 12 lead:        Result                      Value             Ref Range           Ventricular Rate            86                BPM                 Atrial Rate                 86                BPM                 OH Interval                 256               ms                  QRS Duration                114               ms                  QT Interval                 402               ms                  QTC Calculation(Bazett)     481               ms                   R Axis                      -12               degrees             T Axis                      101               degrees             QRS Count                   15                beats               Q Onset                     207               ms                  T Offset                    408               ms                  QTC Fredericia              453               ms             -Prepare RBC: 2 Units:        Result                      Value             Ref Range           PRODUCT CODE                [id number]                              Unit Number                                                   [id number]       Unit ABO                    O                                     Unit RH                     POS                                   XM INTEP                    COMP                                  Dispense Status             TR                                    Blood Expiration Date                                         7/26/2024 11:59:00 PM EDT       PRODUCT BLOOD TYPE          5100                                  UNIT VOLUME                 350                                   PRODUCT CODE                [id number]                              Unit Number                                                   [id number]       Unit ABO                    O                                     Unit RH                     POS                                   XM INTEP                    COMP                                  Dispense Status             TR                                    Blood Expiration Date                                         8/14/2024 11:59:00 PM EDT       PRODUCT BLOOD TYPE          5100                                  UNIT VOLUME                 289                              XR chest 2 views   Final Result    1. As above.          MACRO:    None.          Signed by: Sofia Rangel 7/11/2024 7:50 PM    Dictation workstation:   [id number]                    Patient History   Past Medical History:   Diagnosis Date    Angiodysplasia of stomach and duodenum without bleeding     Gastric AV malformation    COVID-19     COVID-19 virus infection    Diverticulosis of intestine, part unspecified, without perforation or abscess without bleeding     Diverticulosis    End stage renal disease (Multi) 08/02/2021    ESRD (end stage renal disease)    Other hemorrhoids     Internal hemorrhoid    Personal history of diseases of the blood and blood-forming organs and certain disorders involving the immune mechanism     History of anemia    Personal history of other diseases of the circulatory system     History of hypertension    Personal history of other diseases of the musculoskeletal system and connective tissue     History of gout    Personal history of other endocrine, nutritional and metabolic disease     History of obesity    Personal history of other medical treatment     History of blood transfusion    Residual hemorrhoidal skin tags     External hemorrhoid    Smoker     Ulcer of anus and rectum     Rectal ulcer    Unspecified osteoarthritis, unspecified site     Osteoarthritis     Past Surgical History:   Procedure Laterality Date    CT ABDOMEN PELVIS ANGIOGRAM W AND/OR WO IV CONTRAST  5/24/2023    CT ABDOMEN PELVIS ANGIOGRAM W AND/OR WO IV CONTRAST 5/24/2023 [facility] CT    IR VENOGRAM DIALYSIS  5/26/2023    IR VENOGRAM DIALYSIS 5/26/2023 [facility]    OTHER SURGICAL HISTORY  01/18/2022    Dialysis tunneled catheter placement    OTHER SURGICAL HISTORY  01/19/2022    Arteriovenous fistula creation procedure     No family history on file.  Social History     Tobacco Use    Smoking status: Never    Smokeless tobacco: Never   Substance Use Topics    Alcohol use: Yes     Comment: RARELY    Drug use: Never       Physical Exam   ED Triage Vitals   Temperature Heart Rate Respirations BP   07/11/24 1903 07/11/24 1903 07/11/24 1903 07/11/24 1903   36.7 °C (98.1 °F) 83 18 146/85       Pulse Ox Temp Source Heart Rate Source Patient Position   07/11/24 1903 07/11/24 2354 -- --   98 % Oral        BP Location FiO2 (%)     -- --             Physical Exam      ED Course & MDM   Diagnoses as of 07/16/24 2119   Anemia requiring transfusions                       No data recorded                      Medical Decision Making      Procedure  Procedures     Tushar Calles,   07/16/24 2127

## 2024-07-18 DIAGNOSIS — I12.9 HYPERTENSIVE CHRONIC KIDNEY DISEASE WITH STAGE 1 THROUGH STAGE 4 CHRONIC KIDNEY DISEASE, OR UNSPECIFIED CHRONIC KIDNEY DISEASE: ICD-10-CM

## 2024-07-18 DIAGNOSIS — E78.2 MIXED HYPERLIPIDEMIA: ICD-10-CM

## 2024-07-18 DIAGNOSIS — R19.7 DIARRHEA, UNSPECIFIED TYPE: ICD-10-CM

## 2024-07-18 DIAGNOSIS — D50.0 IRON DEFICIENCY ANEMIA DUE TO CHRONIC BLOOD LOSS: Primary | ICD-10-CM

## 2024-07-18 DIAGNOSIS — Z12.5 PROSTATE CANCER SCREENING: ICD-10-CM

## 2024-07-18 RX ORDER — LOPERAMIDE HYDROCHLORIDE 2 MG/1
2 CAPSULE ORAL DAILY PRN
Qty: 30 CAPSULE | Refills: 3 | Status: SHIPPED | OUTPATIENT
Start: 2024-07-18 | End: 2024-11-15

## 2024-07-18 RX ORDER — AMLODIPINE BESYLATE 10 MG/1
10 TABLET ORAL DAILY
Qty: 30 TABLET | Refills: 10 | Status: ON HOLD | OUTPATIENT
Start: 2024-07-18 | End: 2024-07-26

## 2024-07-18 RX ORDER — LOSARTAN POTASSIUM 25 MG/1
25 TABLET ORAL DAILY
Qty: 30 TABLET | Refills: 11 | Status: ON HOLD | OUTPATIENT
Start: 2024-07-18 | End: 2024-07-26

## 2024-07-18 RX ORDER — CARVEDILOL 25 MG/1
25 TABLET ORAL EVERY 12 HOURS
Qty: 60 TABLET | Refills: 10 | Status: SHIPPED | OUTPATIENT
Start: 2024-07-18

## 2024-07-18 RX ORDER — HYDRALAZINE HYDROCHLORIDE 100 MG/1
100 TABLET, FILM COATED ORAL 2 TIMES DAILY
Qty: 60 TABLET | Refills: 10 | Status: SHIPPED | OUTPATIENT
Start: 2024-07-18

## 2024-07-18 RX ORDER — DIPHENOXYLATE HYDROCHLORIDE AND ATROPINE SULFATE 2.5; .025 MG/1; MG/1
1 TABLET ORAL DAILY PRN
Qty: 10 TABLET | Refills: 3 | Status: SHIPPED | OUTPATIENT
Start: 2024-07-18 | End: 2024-08-27

## 2024-07-23 ENCOUNTER — HOSPITAL ENCOUNTER (INPATIENT)
Facility: HOSPITAL | Age: 49
LOS: 2 days | Discharge: HOME | End: 2024-07-26
Attending: EMERGENCY MEDICINE | Admitting: NURSE PRACTITIONER
Payer: COMMERCIAL

## 2024-07-23 ENCOUNTER — APPOINTMENT (OUTPATIENT)
Dept: RADIOLOGY | Facility: HOSPITAL | Age: 49
End: 2024-07-23
Payer: COMMERCIAL

## 2024-07-23 DIAGNOSIS — E87.20 METABOLIC ACIDOSIS: Primary | ICD-10-CM

## 2024-07-23 DIAGNOSIS — D64.9 ANEMIA REQUIRING TRANSFUSIONS: ICD-10-CM

## 2024-07-23 DIAGNOSIS — Z99.2 ESRD (END STAGE RENAL DISEASE) ON DIALYSIS (MULTI): Chronic | ICD-10-CM

## 2024-07-23 DIAGNOSIS — I12.9 HYPERTENSIVE CHRONIC KIDNEY DISEASE WITH STAGE 1 THROUGH STAGE 4 CHRONIC KIDNEY DISEASE, OR UNSPECIFIED CHRONIC KIDNEY DISEASE: ICD-10-CM

## 2024-07-23 DIAGNOSIS — R56.9 SEIZURE (MULTI): ICD-10-CM

## 2024-07-23 DIAGNOSIS — N18.6 ESRD (END STAGE RENAL DISEASE) ON DIALYSIS (MULTI): Chronic | ICD-10-CM

## 2024-07-23 LAB
ANION GAP BLDV CALCULATED.4IONS-SCNC: 26 MMOL/L (ref 10–25)
BASE EXCESS BLDV CALC-SCNC: -17.4 MMOL/L (ref -2–3)
BODY TEMPERATURE: 37 DEGREES CELSIUS
CA-I BLDV-SCNC: 0.65 MMOL/L (ref 1.1–1.33)
CHLORIDE BLDV-SCNC: 103 MMOL/L (ref 98–107)
GLUCOSE BLDV-MCNC: 305 MG/DL (ref 74–99)
HCO3 BLDV-SCNC: 9.2 MMOL/L (ref 22–26)
HCT VFR BLD EST: 15 % (ref 41–52)
HGB BLDV-MCNC: 5.1 G/DL (ref 13.5–17.5)
INHALED O2 CONCENTRATION: 0 %
LACTATE BLDV-SCNC: 0.6 MMOL/L (ref 0.4–2)
OXYHGB MFR BLDV: 90.8 % (ref 45–75)
PCO2 BLDV: 24 MM HG (ref 41–51)
PH BLDV: 7.19 PH (ref 7.33–7.43)
PO2 BLDV: 69 MM HG (ref 35–45)
POTASSIUM BLDV-SCNC: 5.9 MMOL/L (ref 3.5–5.3)
SAO2 % BLDV: 92 % (ref 45–75)
SODIUM BLDV-SCNC: 132 MMOL/L (ref 136–145)

## 2024-07-23 PROCEDURE — 87040 BLOOD CULTURE FOR BACTERIA: CPT | Performed by: STUDENT IN AN ORGANIZED HEALTH CARE EDUCATION/TRAINING PROGRAM

## 2024-07-23 PROCEDURE — 36415 COLL VENOUS BLD VENIPUNCTURE: CPT | Performed by: STUDENT IN AN ORGANIZED HEALTH CARE EDUCATION/TRAINING PROGRAM

## 2024-07-23 PROCEDURE — 71046 X-RAY EXAM CHEST 2 VIEWS: CPT

## 2024-07-23 PROCEDURE — 85027 COMPLETE CBC AUTOMATED: CPT | Performed by: STUDENT IN AN ORGANIZED HEALTH CARE EDUCATION/TRAINING PROGRAM

## 2024-07-23 PROCEDURE — 84439 ASSAY OF FREE THYROXINE: CPT | Performed by: STUDENT IN AN ORGANIZED HEALTH CARE EDUCATION/TRAINING PROGRAM

## 2024-07-23 PROCEDURE — 84484 ASSAY OF TROPONIN QUANT: CPT | Performed by: STUDENT IN AN ORGANIZED HEALTH CARE EDUCATION/TRAINING PROGRAM

## 2024-07-23 PROCEDURE — 84443 ASSAY THYROID STIM HORMONE: CPT | Performed by: STUDENT IN AN ORGANIZED HEALTH CARE EDUCATION/TRAINING PROGRAM

## 2024-07-23 PROCEDURE — 84132 ASSAY OF SERUM POTASSIUM: CPT | Performed by: STUDENT IN AN ORGANIZED HEALTH CARE EDUCATION/TRAINING PROGRAM

## 2024-07-23 PROCEDURE — 85007 BL SMEAR W/DIFF WBC COUNT: CPT | Performed by: STUDENT IN AN ORGANIZED HEALTH CARE EDUCATION/TRAINING PROGRAM

## 2024-07-23 PROCEDURE — 83735 ASSAY OF MAGNESIUM: CPT | Performed by: STUDENT IN AN ORGANIZED HEALTH CARE EDUCATION/TRAINING PROGRAM

## 2024-07-23 PROCEDURE — 83880 ASSAY OF NATRIURETIC PEPTIDE: CPT | Performed by: STUDENT IN AN ORGANIZED HEALTH CARE EDUCATION/TRAINING PROGRAM

## 2024-07-23 PROCEDURE — 71046 X-RAY EXAM CHEST 2 VIEWS: CPT | Mod: FOREIGN READ | Performed by: RADIOLOGY

## 2024-07-23 PROCEDURE — 99285 EMERGENCY DEPT VISIT HI MDM: CPT

## 2024-07-24 ENCOUNTER — APPOINTMENT (OUTPATIENT)
Dept: DIALYSIS | Facility: HOSPITAL | Age: 49
End: 2024-07-24
Payer: COMMERCIAL

## 2024-07-24 ENCOUNTER — CLINICAL SUPPORT (OUTPATIENT)
Dept: EMERGENCY MEDICINE | Facility: HOSPITAL | Age: 49
End: 2024-07-24
Payer: COMMERCIAL

## 2024-07-24 LAB
ABO GROUP (TYPE) IN BLOOD: NORMAL
ALBUMIN SERPL BCP-MCNC: 2.9 G/DL (ref 3.4–5)
ALBUMIN SERPL BCP-MCNC: 3.1 G/DL (ref 3.4–5)
ALP SERPL-CCNC: 85 U/L (ref 33–120)
ALT SERPL W P-5'-P-CCNC: 12 U/L (ref 10–52)
ANION GAP SERPL CALC-SCNC: 29 MMOL/L (ref 10–20)
ANION GAP SERPL CALC-SCNC: 30 MMOL/L (ref 10–20)
ANTIBODY SCREEN: NORMAL
APTT PPP: 32 SECONDS (ref 27–38)
AST SERPL W P-5'-P-CCNC: 16 U/L (ref 9–39)
ATRIAL RATE: 75 BPM
BASOPHILS # BLD MANUAL: 0.06 X10*3/UL (ref 0–0.1)
BASOPHILS NFR BLD MANUAL: 0.9 %
BILIRUB SERPL-MCNC: 0.2 MG/DL (ref 0–1.2)
BLOOD EXPIRATION DATE: NORMAL
BLOOD EXPIRATION DATE: NORMAL
BNP SERPL-MCNC: 500 PG/ML (ref 0–99)
BUN SERPL-MCNC: 135 MG/DL (ref 6–23)
BUN SERPL-MCNC: 136 MG/DL (ref 6–23)
CA-I BLD-SCNC: 0.64 MMOL/L (ref 1.1–1.33)
CALCIUM SERPL-MCNC: 5.2 MG/DL (ref 8.6–10.6)
CALCIUM SERPL-MCNC: 5.5 MG/DL (ref 8.6–10.6)
CARDIAC TROPONIN I PNL SERPL HS: 55 NG/L (ref 0–53)
CARDIAC TROPONIN I PNL SERPL HS: 62 NG/L (ref 0–53)
CHLORIDE SERPL-SCNC: 103 MMOL/L (ref 98–107)
CHLORIDE SERPL-SCNC: 104 MMOL/L (ref 98–107)
CO2 SERPL-SCNC: 10 MMOL/L (ref 21–32)
CO2 SERPL-SCNC: 10 MMOL/L (ref 21–32)
CREAT SERPL-MCNC: >25 MG/DL (ref 0.5–1.3)
CREAT SERPL-MCNC: >25 MG/DL (ref 0.5–1.3)
DISPENSE STATUS: NORMAL
DISPENSE STATUS: NORMAL
EGFRCR SERPLBLD CKD-EPI 2021: ABNORMAL ML/MIN/{1.73_M2}
EGFRCR SERPLBLD CKD-EPI 2021: ABNORMAL ML/MIN/{1.73_M2}
EOSINOPHIL # BLD MANUAL: 0 X10*3/UL (ref 0–0.7)
EOSINOPHIL NFR BLD MANUAL: 0 %
ERYTHROCYTE [DISTWIDTH] IN BLOOD BY AUTOMATED COUNT: 15.6 % (ref 11.5–14.5)
ERYTHROCYTE [DISTWIDTH] IN BLOOD BY AUTOMATED COUNT: 16.2 % (ref 11.5–14.5)
GLUCOSE BLD MANUAL STRIP-MCNC: 91 MG/DL (ref 74–99)
GLUCOSE SERPL-MCNC: 121 MG/DL (ref 74–99)
GLUCOSE SERPL-MCNC: 78 MG/DL (ref 74–99)
HBV SURFACE AB SER-ACNC: <3.1 MIU/ML
HBV SURFACE AG SERPL QL IA: NONREACTIVE
HCT VFR BLD AUTO: 15.1 % (ref 41–52)
HCT VFR BLD AUTO: 16.6 % (ref 41–52)
HGB BLD-MCNC: 4.8 G/DL (ref 13.5–17.5)
HGB BLD-MCNC: 5.4 G/DL (ref 13.5–17.5)
IMM GRANULOCYTES # BLD AUTO: 0.14 X10*3/UL (ref 0–0.7)
IMM GRANULOCYTES NFR BLD AUTO: 1.9 % (ref 0–0.9)
INR PPP: 1.1 (ref 0.9–1.1)
LYMPHOCYTES # BLD MANUAL: 0.68 X10*3/UL (ref 1.2–4.8)
LYMPHOCYTES NFR BLD MANUAL: 9.5 %
MAGNESIUM SERPL-MCNC: 2.19 MG/DL (ref 1.6–2.4)
MCH RBC QN AUTO: 27.8 PG (ref 26–34)
MCH RBC QN AUTO: 27.9 PG (ref 26–34)
MCHC RBC AUTO-ENTMCNC: 31.8 G/DL (ref 32–36)
MCHC RBC AUTO-ENTMCNC: 32.5 G/DL (ref 32–36)
MCV RBC AUTO: 86 FL (ref 80–100)
MCV RBC AUTO: 88 FL (ref 80–100)
MONOCYTES # BLD MANUAL: 0.25 X10*3/UL (ref 0.1–1)
MONOCYTES NFR BLD MANUAL: 3.5 %
NEUTS SEG # BLD MANUAL: 6.2 X10*3/UL (ref 1.2–7)
NEUTS SEG NFR BLD MANUAL: 86.1 %
NRBC BLD-RTO: 0.3 /100 WBCS (ref 0–0)
NRBC BLD-RTO: 0.5 /100 WBCS (ref 0–0)
P AXIS: 35 DEGREES
P OFFSET: 137 MS
P ONSET: 82 MS
PHOSPHATE SERPL-MCNC: 17.1 MG/DL (ref 2.5–4.9)
PLATELET # BLD AUTO: 174 X10*3/UL (ref 150–450)
PLATELET # BLD AUTO: 188 X10*3/UL (ref 150–450)
POTASSIUM SERPL-SCNC: 5.2 MMOL/L (ref 3.5–5.3)
POTASSIUM SERPL-SCNC: 5.6 MMOL/L (ref 3.5–5.3)
PR INTERVAL: 266 MS
PRODUCT BLOOD TYPE: 5100
PRODUCT BLOOD TYPE: 5100
PRODUCT CODE: NORMAL
PRODUCT CODE: NORMAL
PROT SERPL-MCNC: 6 G/DL (ref 6.4–8.2)
PROTHROMBIN TIME: 12.7 SECONDS (ref 9.8–12.8)
Q ONSET: 215 MS
QRS COUNT: 13 BEATS
QRS DURATION: 118 MS
QT INTERVAL: 456 MS
QTC CALCULATION(BAZETT): 509 MS
QTC FREDERICIA: 490 MS
R AXIS: -12 DEGREES
RBC # BLD AUTO: 1.72 X10*6/UL (ref 4.5–5.9)
RBC # BLD AUTO: 1.94 X10*6/UL (ref 4.5–5.9)
RBC MORPH BLD: ABNORMAL
RH FACTOR (ANTIGEN D): NORMAL
SODIUM SERPL-SCNC: 137 MMOL/L (ref 136–145)
SODIUM SERPL-SCNC: 138 MMOL/L (ref 136–145)
T AXIS: 88 DEGREES
T OFFSET: 443 MS
T4 FREE SERPL-MCNC: 0.73 NG/DL (ref 0.78–1.48)
TOTAL CELLS COUNTED BLD: 115
TSH SERPL-ACNC: 11.51 MIU/L (ref 0.44–3.98)
UNIT ABO: NORMAL
UNIT ABO: NORMAL
UNIT NUMBER: NORMAL
UNIT NUMBER: NORMAL
UNIT RH: NORMAL
UNIT RH: NORMAL
UNIT VOLUME: 297
UNIT VOLUME: 297
VENTRICULAR RATE: 75 BPM
WBC # BLD AUTO: 6.5 X10*3/UL (ref 4.4–11.3)
WBC # BLD AUTO: 7.2 X10*3/UL (ref 4.4–11.3)
XM INTEP: NORMAL
XM INTEP: NORMAL

## 2024-07-24 PROCEDURE — 93005 ELECTROCARDIOGRAM TRACING: CPT

## 2024-07-24 PROCEDURE — 82330 ASSAY OF CALCIUM: CPT | Performed by: STUDENT IN AN ORGANIZED HEALTH CARE EDUCATION/TRAINING PROGRAM

## 2024-07-24 PROCEDURE — 2500000004 HC RX 250 GENERAL PHARMACY W/ HCPCS (ALT 636 FOR OP/ED): Performed by: STUDENT IN AN ORGANIZED HEALTH CARE EDUCATION/TRAINING PROGRAM

## 2024-07-24 PROCEDURE — 86706 HEP B SURFACE ANTIBODY: CPT | Performed by: INTERNAL MEDICINE

## 2024-07-24 PROCEDURE — 2500000004 HC RX 250 GENERAL PHARMACY W/ HCPCS (ALT 636 FOR OP/ED): Mod: JZ | Performed by: STUDENT IN AN ORGANIZED HEALTH CARE EDUCATION/TRAINING PROGRAM

## 2024-07-24 PROCEDURE — 86901 BLOOD TYPING SEROLOGIC RH(D): CPT | Performed by: STUDENT IN AN ORGANIZED HEALTH CARE EDUCATION/TRAINING PROGRAM

## 2024-07-24 PROCEDURE — 2500000004 HC RX 250 GENERAL PHARMACY W/ HCPCS (ALT 636 FOR OP/ED): Performed by: INTERNAL MEDICINE

## 2024-07-24 PROCEDURE — 99253 IP/OBS CNSLTJ NEW/EST LOW 45: CPT | Performed by: INTERNAL MEDICINE

## 2024-07-24 PROCEDURE — 85730 THROMBOPLASTIN TIME PARTIAL: CPT | Performed by: STUDENT IN AN ORGANIZED HEALTH CARE EDUCATION/TRAINING PROGRAM

## 2024-07-24 PROCEDURE — 86923 COMPATIBILITY TEST ELECTRIC: CPT

## 2024-07-24 PROCEDURE — 85027 COMPLETE CBC AUTOMATED: CPT | Performed by: STUDENT IN AN ORGANIZED HEALTH CARE EDUCATION/TRAINING PROGRAM

## 2024-07-24 PROCEDURE — 6350000001 HC RX 635 EPOETIN >10,000 UNITS: Performed by: INTERNAL MEDICINE

## 2024-07-24 PROCEDURE — 87340 HEPATITIS B SURFACE AG IA: CPT | Performed by: INTERNAL MEDICINE

## 2024-07-24 PROCEDURE — 82374 ASSAY BLOOD CARBON DIOXIDE: CPT | Performed by: STUDENT IN AN ORGANIZED HEALTH CARE EDUCATION/TRAINING PROGRAM

## 2024-07-24 PROCEDURE — P9016 RBC LEUKOCYTES REDUCED: HCPCS

## 2024-07-24 PROCEDURE — 36415 COLL VENOUS BLD VENIPUNCTURE: CPT | Performed by: STUDENT IN AN ORGANIZED HEALTH CARE EDUCATION/TRAINING PROGRAM

## 2024-07-24 PROCEDURE — 2500000004 HC RX 250 GENERAL PHARMACY W/ HCPCS (ALT 636 FOR OP/ED): Performed by: NURSE PRACTITIONER

## 2024-07-24 PROCEDURE — 82947 ASSAY GLUCOSE BLOOD QUANT: CPT

## 2024-07-24 PROCEDURE — 93308 TTE F-UP OR LMTD: CPT | Performed by: STUDENT IN AN ORGANIZED HEALTH CARE EDUCATION/TRAINING PROGRAM

## 2024-07-24 PROCEDURE — 1210000001 HC SEMI-PRIVATE ROOM DAILY

## 2024-07-24 PROCEDURE — 99223 1ST HOSP IP/OBS HIGH 75: CPT | Performed by: NURSE PRACTITIONER

## 2024-07-24 PROCEDURE — 2500000001 HC RX 250 WO HCPCS SELF ADMINISTERED DRUGS (ALT 637 FOR MEDICARE OP): Performed by: STUDENT IN AN ORGANIZED HEALTH CARE EDUCATION/TRAINING PROGRAM

## 2024-07-24 PROCEDURE — 85610 PROTHROMBIN TIME: CPT | Performed by: STUDENT IN AN ORGANIZED HEALTH CARE EDUCATION/TRAINING PROGRAM

## 2024-07-24 PROCEDURE — 5A1D70Z PERFORMANCE OF URINARY FILTRATION, INTERMITTENT, LESS THAN 6 HOURS PER DAY: ICD-10-PCS | Performed by: INTERNAL MEDICINE

## 2024-07-24 PROCEDURE — 36430 TRANSFUSION BLD/BLD COMPNT: CPT

## 2024-07-24 PROCEDURE — C9113 INJ PANTOPRAZOLE SODIUM, VIA: HCPCS | Performed by: NURSE PRACTITIONER

## 2024-07-24 PROCEDURE — 84484 ASSAY OF TROPONIN QUANT: CPT | Performed by: STUDENT IN AN ORGANIZED HEALTH CARE EDUCATION/TRAINING PROGRAM

## 2024-07-24 PROCEDURE — 8010000001 HC DIALYSIS - HEMODIALYSIS PER DAY

## 2024-07-24 PROCEDURE — 2500000001 HC RX 250 WO HCPCS SELF ADMINISTERED DRUGS (ALT 637 FOR MEDICARE OP): Performed by: NURSE PRACTITIONER

## 2024-07-24 PROCEDURE — 90935 HEMODIALYSIS ONE EVALUATION: CPT | Performed by: INTERNAL MEDICINE

## 2024-07-24 RX ORDER — HYDRALAZINE HYDROCHLORIDE 50 MG/1
100 TABLET, FILM COATED ORAL 2 TIMES DAILY
Status: DISCONTINUED | OUTPATIENT
Start: 2024-07-24 | End: 2024-07-26 | Stop reason: HOSPADM

## 2024-07-24 RX ORDER — ATORVASTATIN CALCIUM 80 MG/1
80 TABLET, FILM COATED ORAL NIGHTLY
Status: DISCONTINUED | OUTPATIENT
Start: 2024-07-24 | End: 2024-07-26 | Stop reason: HOSPADM

## 2024-07-24 RX ORDER — DIPHENHYDRAMINE HCL 25 MG
25 CAPSULE ORAL ONCE
Status: COMPLETED | OUTPATIENT
Start: 2024-07-24 | End: 2024-07-24

## 2024-07-24 RX ORDER — ACETAMINOPHEN 500 MG
10 TABLET ORAL NIGHTLY PRN
Status: DISCONTINUED | OUTPATIENT
Start: 2024-07-24 | End: 2024-07-26 | Stop reason: HOSPADM

## 2024-07-24 RX ORDER — ACETAMINOPHEN 160 MG/5ML
650 SOLUTION ORAL EVERY 4 HOURS PRN
Status: DISCONTINUED | OUTPATIENT
Start: 2024-07-24 | End: 2024-07-26 | Stop reason: HOSPADM

## 2024-07-24 RX ORDER — ACETAMINOPHEN 650 MG/1
650 SUPPOSITORY RECTAL EVERY 4 HOURS PRN
Status: DISCONTINUED | OUTPATIENT
Start: 2024-07-24 | End: 2024-07-26 | Stop reason: HOSPADM

## 2024-07-24 RX ORDER — ACETAMINOPHEN 325 MG/1
650 TABLET ORAL EVERY 4 HOURS PRN
Status: DISCONTINUED | OUTPATIENT
Start: 2024-07-24 | End: 2024-07-26 | Stop reason: HOSPADM

## 2024-07-24 RX ORDER — LEVETIRACETAM 500 MG/1
500 TABLET ORAL DAILY
Status: DISCONTINUED | OUTPATIENT
Start: 2024-07-24 | End: 2024-07-26 | Stop reason: HOSPADM

## 2024-07-24 RX ORDER — AMLODIPINE BESYLATE 10 MG/1
10 TABLET ORAL DAILY
Status: DISCONTINUED | OUTPATIENT
Start: 2024-07-24 | End: 2024-07-26 | Stop reason: HOSPADM

## 2024-07-24 RX ORDER — FUROSEMIDE 10 MG/ML
100 INJECTION INTRAMUSCULAR; INTRAVENOUS ONCE
Status: COMPLETED | OUTPATIENT
Start: 2024-07-24 | End: 2024-07-24

## 2024-07-24 RX ORDER — LORAZEPAM 2 MG/ML
1 INJECTION INTRAMUSCULAR ONCE
Status: COMPLETED | OUTPATIENT
Start: 2024-07-24 | End: 2024-07-24

## 2024-07-24 RX ORDER — PANTOPRAZOLE SODIUM 40 MG/10ML
80 INJECTION, POWDER, LYOPHILIZED, FOR SOLUTION INTRAVENOUS ONCE
Status: DISCONTINUED | OUTPATIENT
Start: 2024-07-24 | End: 2024-07-24

## 2024-07-24 RX ORDER — PARICALCITOL 5 UG/ML
2 INJECTION, SOLUTION INTRAVENOUS 3 TIMES WEEKLY
Status: DISCONTINUED | OUTPATIENT
Start: 2024-07-24 | End: 2024-07-26 | Stop reason: HOSPADM

## 2024-07-24 RX ORDER — ALLOPURINOL 100 MG/1
100 TABLET ORAL DAILY
Status: DISCONTINUED | OUTPATIENT
Start: 2024-07-24 | End: 2024-07-26 | Stop reason: HOSPADM

## 2024-07-24 RX ORDER — CALCIUM ACETATE 667 MG/1
2000 CAPSULE ORAL
Status: DISCONTINUED | OUTPATIENT
Start: 2024-07-24 | End: 2024-07-25

## 2024-07-24 RX ORDER — CARVEDILOL 25 MG/1
25 TABLET ORAL EVERY 12 HOURS
Status: DISCONTINUED | OUTPATIENT
Start: 2024-07-24 | End: 2024-07-26 | Stop reason: HOSPADM

## 2024-07-24 RX ORDER — TALC
6 POWDER (GRAM) TOPICAL NIGHTLY
Status: DISCONTINUED | OUTPATIENT
Start: 2024-07-24 | End: 2024-07-26 | Stop reason: HOSPADM

## 2024-07-24 RX ORDER — CALCIUM GLUCONATE 20 MG/ML
1 INJECTION, SOLUTION INTRAVENOUS ONCE
Status: COMPLETED | OUTPATIENT
Start: 2024-07-24 | End: 2024-07-24

## 2024-07-24 RX ORDER — INDOMETHACIN 25 MG/1
50 CAPSULE ORAL ONCE
Status: COMPLETED | OUTPATIENT
Start: 2024-07-24 | End: 2024-07-24

## 2024-07-24 RX ORDER — LOSARTAN POTASSIUM 50 MG/1
25 TABLET ORAL DAILY
Status: DISCONTINUED | OUTPATIENT
Start: 2024-07-24 | End: 2024-07-26 | Stop reason: HOSPADM

## 2024-07-24 RX ORDER — PANTOPRAZOLE SODIUM 40 MG/10ML
40 INJECTION, POWDER, LYOPHILIZED, FOR SOLUTION INTRAVENOUS 2 TIMES DAILY
Status: DISCONTINUED | OUTPATIENT
Start: 2024-07-24 | End: 2024-07-25

## 2024-07-24 RX ORDER — POLYETHYLENE GLYCOL 3350 17 G/17G
17 POWDER, FOR SOLUTION ORAL DAILY PRN
Status: DISCONTINUED | OUTPATIENT
Start: 2024-07-24 | End: 2024-07-26 | Stop reason: HOSPADM

## 2024-07-24 SDOH — SOCIAL STABILITY: SOCIAL INSECURITY: HAS ANYONE EVER THREATENED TO HURT YOUR FAMILY OR YOUR PETS?: NO

## 2024-07-24 SDOH — SOCIAL STABILITY: SOCIAL INSECURITY: DOES ANYONE TRY TO KEEP YOU FROM HAVING/CONTACTING OTHER FRIENDS OR DOING THINGS OUTSIDE YOUR HOME?: NO

## 2024-07-24 SDOH — SOCIAL STABILITY: SOCIAL INSECURITY: DO YOU FEEL ANYONE HAS EXPLOITED OR TAKEN ADVANTAGE OF YOU FINANCIALLY OR OF YOUR PERSONAL PROPERTY?: NO

## 2024-07-24 SDOH — SOCIAL STABILITY: SOCIAL INSECURITY: WERE YOU ABLE TO COMPLETE ALL THE BEHAVIORAL HEALTH SCREENINGS?: YES

## 2024-07-24 SDOH — SOCIAL STABILITY: SOCIAL INSECURITY: ABUSE: ADULT

## 2024-07-24 SDOH — SOCIAL STABILITY: SOCIAL INSECURITY: ARE THERE ANY APPARENT SIGNS OF INJURIES/BEHAVIORS THAT COULD BE RELATED TO ABUSE/NEGLECT?: NO

## 2024-07-24 SDOH — SOCIAL STABILITY: SOCIAL INSECURITY: DO YOU FEEL UNSAFE GOING BACK TO THE PLACE WHERE YOU ARE LIVING?: NO

## 2024-07-24 SDOH — SOCIAL STABILITY: SOCIAL INSECURITY: ARE YOU OR HAVE YOU BEEN THREATENED OR ABUSED PHYSICALLY, EMOTIONALLY, OR SEXUALLY BY ANYONE?: NO

## 2024-07-24 SDOH — SOCIAL STABILITY: SOCIAL INSECURITY: HAVE YOU HAD ANY THOUGHTS OF HARMING ANYONE ELSE?: NO

## 2024-07-24 SDOH — SOCIAL STABILITY: SOCIAL INSECURITY: HAVE YOU HAD THOUGHTS OF HARMING ANYONE ELSE?: NO

## 2024-07-24 ASSESSMENT — COGNITIVE AND FUNCTIONAL STATUS - GENERAL
CLIMB 3 TO 5 STEPS WITH RAILING: A LITTLE
WALKING IN HOSPITAL ROOM: A LITTLE
PATIENT BASELINE BEDBOUND: NO
DAILY ACTIVITIY SCORE: 24
MOBILITY SCORE: 22

## 2024-07-24 ASSESSMENT — ENCOUNTER SYMPTOMS
SHORTNESS OF BREATH: 1
DIARRHEA: 0
NAUSEA: 0
FLANK PAIN: 0
PALPITATIONS: 0
HEMATURIA: 0
CHILLS: 0
DYSURIA: 0
ABDOMINAL PAIN: 0
FREQUENCY: 0
CONSTIPATION: 0
VOMITING: 0
FEVER: 0

## 2024-07-24 ASSESSMENT — PAIN SCALES - GENERAL
PAINLEVEL_OUTOF10: 0 - NO PAIN
PAINLEVEL_OUTOF10: 0 - NO PAIN

## 2024-07-24 ASSESSMENT — LIFESTYLE VARIABLES
HAVE YOU OR SOMEONE ELSE BEEN INJURED AS A RESULT OF YOUR DRINKING: NO
HOW OFTEN DURING THE LAST YEAR HAVE YOU FAILED TO DO WHAT WAS NORMALLY EXPECTED FROM YOU BECAUSE OF DRINKING: NEVER
AUDIT-C TOTAL SCORE: 4
HOW OFTEN DURING THE LAST YEAR HAVE YOU FOUND THAT YOU WERE NOT ABLE TO STOP DRINKING ONCE YOU HAD STARTED: NEVER
AUDIT-C TOTAL SCORE: 4
HOW MANY STANDARD DRINKS CONTAINING ALCOHOL DO YOU HAVE ON A TYPICAL DAY: 3 OR 4
AUDIT TOTAL SCORE: 4
AUDIT TOTAL SCORE: 0
HOW OFTEN DURING THE LAST YEAR HAVE YOU NEEDED AN ALCOHOLIC DRINK FIRST THING IN THE MORNING TO GET YOURSELF GOING AFTER A NIGHT OF HEAVY DRINKING: NEVER
HOW OFTEN DURING THE LAST YEAR HAVE YOU BEEN UNABLE TO REMEMBER WHAT HAPPENED THE NIGHT BEFORE BECAUSE YOU HAD BEEN DRINKING: NEVER
HAS A RELATIVE, FRIEND, DOCTOR, OR ANOTHER HEALTH PROFESSIONAL EXPRESSED CONCERN ABOUT YOUR DRINKING OR SUGGESTED YOU CUT DOWN: NO
SKIP TO QUESTIONS 9-10: 0
HOW OFTEN DURING THE LAST YEAR HAVE YOU HAD A FEELING OF GUILT OR REMORSE AFTER DRINKING: NEVER
HOW OFTEN DO YOU HAVE 6 OR MORE DRINKS ON ONE OCCASION: NEVER
HOW OFTEN DO YOU HAVE A DRINK CONTAINING ALCOHOL: 2-3 TIMES A WEEK

## 2024-07-24 ASSESSMENT — ACTIVITIES OF DAILY LIVING (ADL)
JUDGMENT_ADEQUATE_SAFELY_COMPLETE_DAILY_ACTIVITIES: YES
GROOMING: INDEPENDENT
TOILETING: INDEPENDENT
ADEQUATE_TO_COMPLETE_ADL: YES
BATHING: INDEPENDENT
LACK_OF_TRANSPORTATION: NO
DRESSING YOURSELF: INDEPENDENT
WALKS IN HOME: INDEPENDENT
HEARING - RIGHT EAR: FUNCTIONAL
FEEDING YOURSELF: INDEPENDENT
HEARING - LEFT EAR: FUNCTIONAL
PATIENT'S MEMORY ADEQUATE TO SAFELY COMPLETE DAILY ACTIVITIES?: YES

## 2024-07-24 ASSESSMENT — PATIENT HEALTH QUESTIONNAIRE - PHQ9
2. FEELING DOWN, DEPRESSED OR HOPELESS: SEVERAL DAYS
SUM OF ALL RESPONSES TO PHQ9 QUESTIONS 1 & 2: 2
1. LITTLE INTEREST OR PLEASURE IN DOING THINGS: SEVERAL DAYS

## 2024-07-24 ASSESSMENT — PAIN - FUNCTIONAL ASSESSMENT
PAIN_FUNCTIONAL_ASSESSMENT: NO/DENIES PAIN
PAIN_FUNCTIONAL_ASSESSMENT: NO/DENIES PAIN
PAIN_FUNCTIONAL_ASSESSMENT: 0-10

## 2024-07-24 NOTE — NURSING NOTE
Report to Receiving RN:    Report To: ED Via Secure Chat  Time Report Called: 1400  Hand-Off Communication: Pt calmed down after blood was transfused. 2.6 L of fluid removed  Complications During Treatment: No  Ultrafiltration Treatment: Yes  Medications Administered During Dialysis: No  Blood Products Administered During Dialysis: Yes 1  PRBCs  Labs Sent During Dialysis: No  Heparin Drip Rate Changes: NA  Dialysis Catheter Dressing: NA  Last Dressing Change: NA      Last Updated: 1:38 PM by DRE OGDEN

## 2024-07-24 NOTE — CARE PLAN
50 yo AA male with PMHx ESRD on dialysis MWF  BILLY AV fistula presented with SOB . He has multiple visits to ED is past week for similar complain.He had his last dialysis on Monday.   Nephrology is consulted for urgent dialysis owing to SOB and mild fluid overload.    -Patient seems hemodynamically stable and maintaining saturations on 2 L via nasal cannula.   -His labs showing severe anemia , Acidosis and uremia.    Recommendation:    - Please proceed with blood transfusion as already planned by primary team as his SOB could be possibly due to low Hb.  -No acute need for HD at the moment and he can proceed with dialysis tomorrow morning.   -Recommend replacing bicarb.  -Renal team will see him in the morning.  -Consult notes to follow.

## 2024-07-24 NOTE — NURSING NOTE
Report to Receiving RN:    Report To: [place] via Secure Chat  Time Report Called: 1405  Hand-Off Communication: 2.6 L of fluid removed, 1 U of PRBC's given. Pt became much calmer after transfusion. Post BP  168/97, P 80. O2 sat 100%  Complications During Treatment: No  Ultrafiltration Treatment: Yes  Medications Administered During Dialysis: No  Blood Products Administered During Dialysis: Yes  Labs Sent During Dialysis: No  Heparin Drip Rate Changes: N/A  Dialysis Catheter Dressing: NA  Last Dressing Change: NA  FYI: Pt told us he stooled himself as he was leaving the unit. I was going to clean him but he is so wrapped in blankets and his own clothes and I have no gown or pants for him.     Please call with questions at [phone]  Thank you.    Last Updated: 2:09 PM by DRE OGDEN

## 2024-07-24 NOTE — H&P
History Of Present Illness  Soren Howard is a 49 y.o. male with a past medical history of ESRD on HD with multiple hospitalization 2/2 missed HD, HTN, HLD, Seizure, gout, obesity, and angiodysplasia of stomach/duodenum who presented to the ED originally with slurred speech. Enroute to the ED, EMS noted his blood glucose was 35, requiring 1 tube of oral glucose and 100 mL of D10 to correct. On exam in ED35, he endorses shortness of breath, worse with lying flat. He denies any fever, chills, chest pain, shortness of breath, melena, coffee-ground emesis, nausea, or vomiting. He reports his BLE is at baseline. He denies missing any HD sessions. He reports making a small amount of urine       Past Medical History  Past Medical History:   Diagnosis Date    Angiodysplasia of stomach and duodenum without bleeding     Gastric AV malformation    COVID-19     COVID-19 virus infection    Diverticulosis of intestine, part unspecified, without perforation or abscess without bleeding     Diverticulosis    End stage renal disease (Multi) 08/02/2021    ESRD (end stage renal disease)    Other hemorrhoids     Internal hemorrhoid    Personal history of diseases of the blood and blood-forming organs and certain disorders involving the immune mechanism     History of anemia    Personal history of other diseases of the circulatory system     History of hypertension    Personal history of other diseases of the musculoskeletal system and connective tissue     History of gout    Personal history of other endocrine, nutritional and metabolic disease     History of obesity    Personal history of other medical treatment     History of blood transfusion    Residual hemorrhoidal skin tags     External hemorrhoid    Smoker     Ulcer of anus and rectum     Rectal ulcer    Unspecified osteoarthritis, unspecified site     Osteoarthritis       Surgical History  Past Surgical History:   Procedure Laterality Date    CT ABDOMEN PELVIS ANGIOGRAM W AND/OR  WO IV CONTRAST  5/24/2023    CT ABDOMEN PELVIS ANGIOGRAM W AND/OR WO IV CONTRAST 5/24/2023 [facility] CT    IR VENOGRAM DIALYSIS  5/26/2023    IR VENOGRAM DIALYSIS 5/26/2023 [facility] ANGIO    OTHER SURGICAL HISTORY  01/18/2022    Dialysis tunneled catheter placement    OTHER SURGICAL HISTORY  01/19/2022    Arteriovenous fistula creation procedure        Social History  He reports that he has never smoked. He has never used smokeless tobacco. He reports current alcohol use. He reports that he does not use drugs.    Family History  No family history on file.     Allergies  Piperacillin-tazobactam-dextrs    Review of Systems   Constitutional:  Negative for chills and fever.   Respiratory:  Positive for shortness of breath.    Cardiovascular:  Negative for chest pain and palpitations.   Gastrointestinal:  Negative for abdominal pain, constipation, diarrhea, nausea and vomiting.   Genitourinary:  Negative for dysuria, flank pain, frequency, hematuria and urgency.   All other systems reviewed and are negative.       Physical Exam  Vitals reviewed.   HENT:      Head: Normocephalic and atraumatic.   Neck:      Vascular: JVD present.   Cardiovascular:      Rate and Rhythm: Normal rate and regular rhythm.      Heart sounds: Normal heart sounds.   Pulmonary:      Effort: Pulmonary effort is normal.      Breath sounds: Normal air entry. Rhonchi present.   Abdominal:      General: Bowel sounds are normal.      Palpations: Abdomen is soft.      Tenderness: There is no abdominal tenderness.   Musculoskeletal:         General: No deformity.      Right lower leg: Edema present.      Left lower leg: Edema present.   Skin:     General: Skin is warm and dry.   Neurological:      General: No focal deficit present.      Mental Status: He is alert and oriented to person, place, and time.   Psychiatric:         Mood and Affect: Mood normal.         Behavior: Behavior normal.          Last Recorded Vitals  Blood pressure 125/75, pulse 83, temperature  35.5 °C (95.9 °F), temperature source Tympanic, resp. rate 17, SpO2 94%.    Relevant Results      Lab Results   Component Value Date    WBC 7.2 07/23/2024    HGB 4.8 (LL) 07/23/2024    HCT 15.1 (L) 07/23/2024    MCV 88 07/23/2024     07/23/2024     Lab Results   Component Value Date    GLUCOSE 121 (H) 07/23/2024    CALCIUM 5.5 (LL) 07/23/2024     07/23/2024    K 5.2 07/23/2024    CO2 10 (LL) 07/23/2024     07/23/2024     (HH) 07/23/2024    CREATININE >25.00 (H) 07/23/2024     Point of Care Ultrasound  Osman Garcia DO     7/24/2024 12:56 AM    Performed by: Osman Garcia DO  Authorized by: Tacho Bautista MD  Cardiac Indications: fluid overload    Respiratory Indications: shortness of breath  Procedure: Cardiac Ultrasound    Findings:   Views: parasternal long, parasternal short, apical four and subxiphoid  The pericardial space was visualized and was NEGATIVE for a significant   pericardial effusion.  Activity: Ventricular contractions were visualized.  LV: LV systolic function was NORMAL.  RV: RV size was DILATED.    Impression:  Cardiac: The focused cardiac ultrasound exam had ABNORMAL findings as   specified.  XR chest 2 views  Narrative: STUDY:  Chest Radiographs;  7/23/2024 11:23PM  INDICATION:  Shortness of breath. Hypoxia.  COMPARISON:  7/11/2024 XR Chest, 2/5/2024 XR Chest.  ACCESSION NUMBER(S):  [id number]  ORDERING CLINICIAN:  CASSI GIBBS  TECHNIQUE:  Frontal and lateral chest.   FINDINGS:  CARDIOMEDIASTINAL SILHOUETTE:  Cardiomediastinal silhouette is enlarged in size and configuration.     LUNGS:  There is a large right pleural effusion.  Increased perihilar  interstitial markings are seen bilaterally.  Probable atelectasis is  noted in the right lung.     ABDOMEN:  No remarkable upper abdominal findings.     BONES:  No acute osseous changes.  Impression: Large right pleural effusion with suspected atelectasis in the right  lung.  Superimposed pneumonia in the  right mid to lower lung zone is  not excluded.  Mild pulmonary edema.  Signed by Mikhail Regalado         Assessment/Plan   Principal Problem:    Metabolic acidosis      #Severe anemia  -Normotensive, no tachycardia  -Suspect 2/2 ESRD, could be exaggerated ISO hypervolemia  -Given 2 unit pRBC in AM  -Recently seen at [facility] and left [facility] for similar  -No DVT chemoppx, SCDs  -Protonix IV BID for now    #Metabolic acidosis  #ESRD on HD  #Hypervolemia  #Uremia  -CXR with large right pleural effusion and increased perihilar interstitial markings  -RFP suggestive of missed HD  -Suspect hypervolemia ISO missed HD  -Nephrology evaluated patient in ED  -HCO3 given in ED  -Will give Lasix IV 100mg once  -Further electrolyte and acid/base balance per nephrology  -HD in AM  -Telemetry    #Seizure disorder  -Continue Keppra  -Fall, seizure precautions    #HTN  #HLD  -Chronic  -Continue home medications           Best Resendez, APRN-CNP

## 2024-07-24 NOTE — CONSULTS
Soren Howard   49 y.o.    There were no vitals filed for this visit.   MRN/Room: [id number]    Reason for consult: End-stage renal disease on dialysis.  Requesting physician: Dr. Woods    History Of Present Illness  Soren Howard is a 49 y.o. male presenting with a past medical history of ESRD on HD with multiple hospitalization 2/2 missed HD, HTN, HLD, Seizure, gout, obesity, and angiodysplasia of stomach/duodenum who presented to the ED originally with slurred speech. Enroute to the ED, EMS noted his blood glucose was 35, requiring 1 tube of oral glucose and 100 mL of D10 to correct.      Past Medical History  He has a past medical history of Angiodysplasia of stomach and duodenum without bleeding, COVID-19, Diverticulosis of intestine, part unspecified, without perforation or abscess without bleeding, End stage renal disease (Multi) (08/02/2021), Other hemorrhoids, Personal history of diseases of the blood and blood-forming organs and certain disorders involving the immune mechanism, Personal history of other diseases of the circulatory system, Personal history of other diseases of the musculoskeletal system and connective tissue, Personal history of other endocrine, nutritional and metabolic disease, Personal history of other medical treatment, Residual hemorrhoidal skin tags, Smoker, Ulcer of anus and rectum, and Unspecified osteoarthritis, unspecified site.    Surgical History  He has a past surgical history that includes Other surgical history (01/18/2022); Other surgical history (01/19/2022); CT angio abdomen pelvis w and or wo IV IV contrast (5/24/2023); and IR venogram dialysis (5/26/2023).     Social History  He reports that he has never smoked. He has never used smokeless tobacco. He reports current alcohol use. He reports that he does not use drugs.    Family History  No family history on file.     Allergies  Piperacillin-tazobactam-dextrs                  Meds:   allopurinol, 100 mg,  Daily  amLODIPine, 10 mg, Daily  atorvastatin, 80 mg, Nightly  B complex-vitamin C-folic acid, 1 capsule, Daily  calcium acetate, 2,000 mg, TID  carvedilol, 25 mg, q12h  epoetin tavia or biosimilar, 20,000 Units, Once per day on Monday Wednesday Friday  hydrALAZINE, 100 mg, BID  levETIRAcetam, 500 mg, Daily  losartan, 25 mg, Daily  melatonin, 6 mg, Nightly  pantoprazole, 40 mg, BID  paricalcitol, 2 mcg, Once per day on Monday Wednesday Friday         acetaminophen, 650 mg, q4h PRN   Or  acetaminophen, 650 mg, q4h PRN   Or  acetaminophen, 650 mg, q4h PRN  melatonin, 10 mg, Nightly PRN  polyethylene glycol, 17 g, Daily PRN      @[email or web address]@  PRN medications: acetaminophen **OR** acetaminophen **OR** acetaminophen, melatonin, polyethylene glycol  Prior to Admission Medications   Prescriptions Last Dose Informant Patient Reported? Taking?   allopurinol (Zyloprim) 100 mg tablet   Yes No   Sig: Take 1 tablet (100 mg) by mouth once daily.   amLODIPine (Norvasc) 10 mg tablet   No No   Sig: Take 1 tablet (10 mg) by mouth once daily.   atorvastatin (Lipitor) 80 mg tablet   Yes No   Sig: Take 1 tablet (80 mg) by mouth once daily at bedtime.   calcium acetate (Phoslo) 667 mg capsule   No No   Sig: Take 3 capsules (2,000 mg) by mouth 3 times a day with meals.   carvedilol (Coreg) 25 mg tablet   No No   Sig: Take 1 tablet (25 mg) by mouth every 12 hours.   cholecalciferol (Vitamin D-3) 50 mcg (2,000 unit) capsule   Yes No   Sig: Take 1 capsule (50 mcg) by mouth early in the morning..   colchicine 0.6 mg tablet   No No   Sig: Take 1 tablet (0.6 mg) by mouth once daily as needed for muscle/joint pain (Acut gout flare x 1-2 days).   diphenoxylate-atropine (Lomotil) 2.5-0.025 mg tablet   No No   Sig: Take 1 tablet by mouth once daily as needed for diarrhea.   hydrALAZINE (Apresoline) 100 mg tablet   No No   Sig: Take 1 tablet (100 mg) by mouth 2 times a day.   levETIRAcetam (Keppra) 500 mg tablet   No No   Sig: Take 1 tablet (500  mg) by mouth once daily. And one additional tablet after dialysis   lidocaine-prilocaine (Emla) 2.5-2.5 % cream   No No   Sig: Apply thick layer 2 hours prior to dialysis site, cover with plastic wrap.   loperamide (Imodium) 2 mg capsule   No No   Sig: Take 1 capsule (2 mg) by mouth once daily as needed for diarrhea.   losartan (Cozaar) 25 mg tablet   No No   Sig: Take 1 tablet (25 mg) by mouth once daily.      Facility-Administered Medications: None     Current Facility-Administered Medications   Medication Dose Route Frequency Provider Last Rate Last Admin    acetaminophen (Tylenol) tablet 650 mg  650 mg oral q4h PRN Best Resendez APRN-CNP        Or    acetaminophen (Tylenol) oral liquid 650 mg  650 mg oral q4h PRN Best Resendez APRESTRELLA-CNP        Or    acetaminophen (Tylenol) suppository 650 mg  650 mg rectal q4h PRN Best Resendez APRN-CNP        allopurinol (Zyloprim) tablet 100 mg  100 mg oral Daily Best Resendez APRN-CNP   100 mg at 07/24/24 0858    amLODIPine (Norvasc) tablet 10 mg  10 mg oral Daily Best Resendez APRN-CNP   10 mg at 07/24/24 0857    atorvastatin (Lipitor) tablet 80 mg  80 mg oral Nightly Best Resendez APRN-CNP   80 mg at 07/24/24 0358    B complex-vitamin C-folic acid (Nephrocaps) capsule 1 capsule  1 capsule oral Daily Aly Hankins MD   1 capsule at 07/24/24 1531    calcium acetate (Phoslo) capsule 2,000 mg  2,000 mg oral TID Best Resendez APRN-CNP   2,000 mg at 07/24/24 0858    carvedilol (Coreg) tablet 25 mg  25 mg oral q12h Best Resendez APRN-CNP   25 mg at 07/24/24 1531    epoetin tavia (Epogen,Procrit) injection 20,000 Units  20,000 Units intravenous Once per day on Monday Wednesday Friday Yang Anaya MD   20,000 Units at 07/24/24 0858    hydrALAZINE (Apresoline) tablet 100 mg  100 mg oral BID ZANDER Healy   100 mg at 07/24/24 0857    levETIRAcetam (Keppra) tablet 500 mg  500 mg oral Daily ZANDER Healy   500 mg  at 07/24/24 0858    losartan (Cozaar) tablet 25 mg  25 mg oral Daily ZANDER Healy   25 mg at 07/24/24 0859    melatonin tablet 10 mg  10 mg oral Nightly PRN ZANDER Healy   10 mg at 07/24/24 0212    melatonin tablet 6 mg  6 mg oral Nightly Osman Garcia DO        pantoprazole (ProtoNix) injection 40 mg  40 mg intravenous BID ZANDER Healy   40 mg at 07/24/24 0205    paricalcitoL (Zemplar) injection 2 mcg  2 mcg intravenous Once per day on Monday Wednesday Friday Yang Anaya MD   2 mcg at 07/24/24 1526    polyethylene glycol (Glycolax, Miralax) packet 17 g  17 g oral Daily PRN ZANDER Healy         Current Outpatient Medications   Medication Sig Dispense Refill    allopurinol (Zyloprim) 100 mg tablet Take 1 tablet (100 mg) by mouth once daily.      amLODIPine (Norvasc) 10 mg tablet Take 1 tablet (10 mg) by mouth once daily. 30 tablet 10    atorvastatin (Lipitor) 80 mg tablet Take 1 tablet (80 mg) by mouth once daily at bedtime.      calcium acetate (Phoslo) 667 mg capsule Take 3 capsules (2,000 mg) by mouth 3 times a day with meals. 270 capsule 5    carvedilol (Coreg) 25 mg tablet Take 1 tablet (25 mg) by mouth every 12 hours. 60 tablet 10    cholecalciferol (Vitamin D-3) 50 mcg (2,000 unit) capsule Take 1 capsule (50 mcg) by mouth early in the morning..      colchicine 0.6 mg tablet Take 1 tablet (0.6 mg) by mouth once daily as needed for muscle/joint pain (Acut gout flare x 1-2 days). 30 tablet 1    diphenoxylate-atropine (Lomotil) 2.5-0.025 mg tablet Take 1 tablet by mouth once daily as needed for diarrhea. 10 tablet 3    hydrALAZINE (Apresoline) 100 mg tablet Take 1 tablet (100 mg) by mouth 2 times a day. 60 tablet 10    levETIRAcetam (Keppra) 500 mg tablet Take 1 tablet (500 mg) by mouth once daily. And one additional tablet after dialysis 60 tablet 5    lidocaine-prilocaine (Emla) 2.5-2.5 % cream Apply thick layer 2 hours prior to dialysis site,  cover with plastic wrap. 30 g 11    loperamide (Imodium) 2 mg capsule Take 1 capsule (2 mg) by mouth once daily as needed for diarrhea. 30 capsule 3    losartan (Cozaar) 25 mg tablet Take 1 tablet (25 mg) by mouth once daily. 30 tablet 11         ROS:  The patient is awake and oriented. No dizziness or lightheadedness. No chills and no fever. No headaches. No nausea and no vomiting. No shortness of breath. No cough. No sputum. No chest pain. No chest tightness. No abdominal pain. No diarrhea and no constipation. No hematemesis or hemoptysis. No hematuria. No rectal bleeding. No melena. No epistaxis. No urinary symptoms. No flank pain. No leg edema. No leg pain. No weakness. No itching. Overall, the rest of the review of systems is also negative.  12 point review of systems otherwise negative as stated in HPI.        Physical Exam:        Vitals:    07/24/24 1425   BP: (!) 156/101   Pulse: 80   Resp: 16   Temp:    SpO2: 98%     General: The patient is awake, oriented, and is not in any distress.  Head and Neck: Normocephalic. No periorbital edema.  Respiratory: Symmetric air entry. Symmetric chest expansion.No respiratory distress.  Cardiovascular: Symmetric peripheral pulses.  Skin: No maculopapular rash.  Musculoskeletal: No peripheral edema in both left and right upper extremities.  No edema in either left or right lower extremities.  Neuro Exam: Speech is fluent. Moves extremities.        Blood Labs:  Results for orders placed or performed during the hospital encounter of 07/23/24 (from the past 24 hour(s))   CBC and Auto Differential   Result Value Ref Range    WBC 7.2 4.4 - 11.3 x10*3/uL    nRBC 0.3 (H) 0.0 - 0.0 /100 WBCs    RBC 1.72 (L) 4.50 - 5.90 x10*6/uL    Hemoglobin 4.8 (LL) 13.5 - 17.5 g/dL    Hematocrit 15.1 (L) 41.0 - 52.0 %    MCV 88 80 - 100 fL    MCH 27.9 26.0 - 34.0 pg    MCHC 31.8 (L) 32.0 - 36.0 g/dL    RDW 16.2 (H) 11.5 - 14.5 %    Platelets 188 150 - 450 x10*3/uL    Immature Granulocytes %,  Automated 1.9 (H) 0.0 - 0.9 %    Immature Granulocytes Absolute, Automated 0.14 0.00 - 0.70 x10*3/uL   Comprehensive metabolic panel   Result Value Ref Range    Glucose 121 (H) 74 - 99 mg/dL    Sodium 138 136 - 145 mmol/L    Potassium 5.2 3.5 - 5.3 mmol/L    Chloride 104 98 - 107 mmol/L    Bicarbonate 10 (LL) 21 - 32 mmol/L    Anion Gap 29 (H) 10 - 20 mmol/L    Urea Nitrogen 136 (HH) 6 - 23 mg/dL    Creatinine >25.00 (H) 0.50 - 1.30 mg/dL    eGFR      Calcium 5.5 (LL) 8.6 - 10.6 mg/dL    Albumin 3.1 (L) 3.4 - 5.0 g/dL    Alkaline Phosphatase 85 33 - 120 U/L    Total Protein 6.0 (L) 6.4 - 8.2 g/dL    AST 16 9 - 39 U/L    Bilirubin, Total 0.2 0.0 - 1.2 mg/dL    ALT 12 10 - 52 U/L   Magnesium   Result Value Ref Range    Magnesium 2.19 1.60 - 2.40 mg/dL   Blood Gas Venous Full Panel   Result Value Ref Range    POCT pH, Venous 7.19 (LL) 7.33 - 7.43 pH    POCT pCO2, Venous 24 (L) 41 - 51 mm Hg    POCT pO2, Venous 69 (H) 35 - 45 mm Hg    POCT SO2, Venous 92 (H) 45 - 75 %    POCT Oxy Hemoglobin, Venous 90.8 (H) 45.0 - 75.0 %    POCT Hematocrit Calculated, Venous 15.0 (L) 41.0 - 52.0 %    POCT Sodium, Venous 132 (L) 136 - 145 mmol/L    POCT Potassium, Venous 5.9 (H) 3.5 - 5.3 mmol/L    POCT Chloride, Venous 103 98 - 107 mmol/L    POCT Ionized Calicum, Venous 0.65 (LL) 1.10 - 1.33 mmol/L    POCT Glucose, Venous 305 (H) 74 - 99 mg/dL    POCT Lactate, Venous 0.6 0.4 - 2.0 mmol/L    POCT Base Excess, Venous -17.4 (L) -2.0 - 3.0 mmol/L    POCT HCO3 Calculated, Venous 9.2 (L) 22.0 - 26.0 mmol/L    POCT Hemoglobin, Venous 5.1 (LL) 13.5 - 17.5 g/dL    POCT Anion Gap, Venous 26.0 (H) 10.0 - 25.0 mmol/L    Patient Temperature 37.0 degrees Celsius    FiO2 0 %   Troponin I, High Sensitivity, Initial   Result Value Ref Range    Troponin I, High Sensitivity (CMC) 55 (H) 0 - 53 ng/L   B-Type Natriuretic Peptide   Result Value Ref Range     (H) 0 - 99 pg/mL   Blood Culture    Specimen: Peripheral Venipuncture; Blood culture   Result  Value Ref Range    Blood Culture Loaded on Instrument - Culture in progress    Blood Culture    Specimen: Peripheral Venipuncture; Blood culture   Result Value Ref Range    Blood Culture Loaded on Instrument - Culture in progress    TSH with reflex to Free T4 if abnormal   Result Value Ref Range    Thyroid Stimulating Hormone 11.51 (H) 0.44 - 3.98 mIU/L   Thyroxine, Free   Result Value Ref Range    Thyroxine, Free 0.73 (L) 0.78 - 1.48 ng/dL   Manual Differential   Result Value Ref Range    Neutrophils %, Manual 86.1 40.0 - 80.0 %    Lymphocytes %, Manual 9.5 13.0 - 44.0 %    Monocytes %, Manual 3.5 2.0 - 10.0 %    Eosinophils %, Manual 0.0 0.0 - 6.0 %    Basophils %, Manual 0.9 0.0 - 2.0 %    Seg Neutrophils Absolute, Manual 6.20 1.20 - 7.00 x10*3/uL    Lymphocytes Absolute, Manual 0.68 (L) 1.20 - 4.80 x10*3/uL    Monocytes Absolute, Manual 0.25 0.10 - 1.00 x10*3/uL    Eosinophils Absolute, Manual 0.00 0.00 - 0.70 x10*3/uL    Basophils Absolute, Manual 0.06 0.00 - 0.10 x10*3/uL    Total Cells Counted 115     RBC Morphology See Below    Prepare RBC: 2 Units   Result Value Ref Range    PRODUCT CODE J7330S80     Unit Number [id number]     Unit ABO O     Unit RH POS     XM INTEP COMP     Dispense Status IS     Blood Expiration Date 8/20/2024 11:59:00 PM EDT     PRODUCT BLOOD TYPE 5100     UNIT VOLUME 297     PRODUCT CODE G1336Y49     Unit Number [id number]     Unit ABO O     Unit RH POS     XM INTEP COMP     Dispense Status TR     Blood Expiration Date 8/20/2024 11:59:00 PM EDT     PRODUCT BLOOD TYPE 5100     UNIT VOLUME 297    ECG 12 lead   Result Value Ref Range    Ventricular Rate 75 BPM    Atrial Rate 75 BPM    NM Interval 266 ms    QRS Duration 118 ms    QT Interval 456 ms    QTC Calculation(Bazett) 509 ms    P Axis 35 degrees    R Axis -12 degrees    T Axis 88 degrees    QRS Count 13 beats    Q Onset 215 ms    P Onset 82 ms    P Offset 137 ms    T Offset 443 ms    QTC Fredericia 490 ms   Troponin, High  Sensitivity, 1 Hour   Result Value Ref Range    Troponin I, High Sensitivity (CMC) 62 (H) 0 - 53 ng/L   Calcium, Ionized   Result Value Ref Range    POCT Calcium, Ionized 0.64 (LL) 1.1 - 1.33 mmol/L   Type and Screen   Result Value Ref Range    ABO TYPE O     Rh TYPE POS     ANTIBODY SCREEN NEG    Protime-INR   Result Value Ref Range    Protime 12.7 9.8 - 12.8 seconds    INR 1.1 0.9 - 1.1   APTT   Result Value Ref Range    aPTT 32 27 - 38 seconds   POCT GLUCOSE   Result Value Ref Range    POCT Glucose 91 74 - 99 mg/dL   CBC   Result Value Ref Range    WBC 6.5 4.4 - 11.3 x10*3/uL    nRBC 0.5 (H) 0.0 - 0.0 /100 WBCs    RBC 1.94 (L) 4.50 - 5.90 x10*6/uL    Hemoglobin 5.4 (LL) 13.5 - 17.5 g/dL    Hematocrit 16.6 (L) 41.0 - 52.0 %    MCV 86 80 - 100 fL    MCH 27.8 26.0 - 34.0 pg    MCHC 32.5 32.0 - 36.0 g/dL    RDW 15.6 (H) 11.5 - 14.5 %    Platelets 174 150 - 450 x10*3/uL   Renal function panel   Result Value Ref Range    Glucose 78 74 - 99 mg/dL    Sodium 137 136 - 145 mmol/L    Potassium 5.6 (H) 3.5 - 5.3 mmol/L    Chloride 103 98 - 107 mmol/L    Bicarbonate 10 (LL) 21 - 32 mmol/L    Anion Gap 30 (H) 10 - 20 mmol/L    Urea Nitrogen 135 (HH) 6 - 23 mg/dL    Creatinine >25.00 (H) 0.50 - 1.30 mg/dL    eGFR      Calcium 5.2 (LL) 8.6 - 10.6 mg/dL    Phosphorus 17.1 (H) 2.5 - 4.9 mg/dL    Albumin 2.9 (L) 3.4 - 5.0 g/dL   Hepatitis B surface antigen   Result Value Ref Range    Hepatitis B Surface AG Nonreactive Nonreactive   Hepatitis B surface antibody   Result Value Ref Range    Hepatitis B Surface AB <3.1 <10.0 mIU/mL      Lab Results   Component Value Date    GLUCOSE 78 07/24/2024    CALCIUM 5.2 (LL) 07/24/2024     07/24/2024    K 5.6 (H) 07/24/2024    CO2 10 (LL) 07/24/2024     07/24/2024     (HH) 07/24/2024    CREATININE >25.00 (H) 07/24/2024       Imaging:  === 02/18/18 ===    US RENAL COMPLETE    - Impression -  Evaluation severely limited secondary to the patient's body habitus  and anatomic  positioning of the right kidney. Given limitations,  unremarkable Doppler of the left kidney, limited visualization of the  right kidney, and no evidence of bilateral hydronephrosis or  nephrolithiasis. If clinically indicated CTA or MRA may be obtained  for further vascular assessment.    I personally reviewed the images/study and I agree with the findings  as stated. This study was interpreted at [facility], [place], [place].      Assessment and Plan:  49-year-old male with multiple medical problems including end-stage renal disease on hemodialysis.  I visited and examined him on dialysis.  He is tolerating dialysis well.  His access function is fine.  We will continue his dialysis as per his regular schedule.    Yang Anaya MD  Senior Attending Physician  Director of Onco-Nephrology Program  Division of Nephrology & Hypertension  [facility]

## 2024-07-24 NOTE — ED PROCEDURE NOTE
Procedure    Performed by: Osman Garcia DO  Authorized by: Tacho Bautista MD  Cardiac Indications: fluid overload              Respiratory Indications: shortness of breath  Procedure: Cardiac Ultrasound    Findings:   Views: parasternal long, parasternal short, apical four and subxiphoid  The pericardial space was visualized and was NEGATIVE for a significant pericardial effusion.  Activity: Ventricular contractions were visualized.  LV: LV systolic function was NORMAL.  RV: RV size was DILATED.    Impression:  Cardiac: The focused cardiac ultrasound exam had ABNORMAL findings as specified.                   Osman Garcia DO  Resident  07/24/24 0056

## 2024-07-24 NOTE — NURSING NOTE
Report from Sending RN:    Report From: Jericho ( RN)  Recent Surgery of Procedure: No  Baseline Level of Consciousness (LOC): a/o x 4  Oxygen Use: Yes, 4 liters  Type: nc  Diabetic: No  Last BP Med Given Day of Dialysis: none  Last Pain Med Given: none  Lab Tests to be Obtained with Dialysis: Yes, hep b surface antigen/antibody  Blood Transfusion to be Given During Dialysis: Yes, 1 unit PRBC  Available IV Access: Yes  Medications to be Administered During Dialysis: Yes, 1 unit PRBC  Continuous IV Infusion Running: No  Restraints on Currently or in the Last 24 Hours: No  Hand-Off Communication: No acute overnight or morning events; vss; Pt did not take morning medications; Pt will need labs; pt is a full code; Loan Rosa RN.  Dialysis Catheter Dressing: left arm fistula  Last Dressing Change: none

## 2024-07-24 NOTE — PROGRESS NOTES
Pharmacy Medication History Review    Soren Howard is a 49 y.o. male admitted for Metabolic acidosis. Pharmacy reviewed the patient's kdxoe-ed-wujaduinl medications and allergies for accuracy.    Medications ADDED:  N/A  Medications CHANGED:  N/A  Medications REMOVED:   N/A     The list below reflects the updated PTA list. Comments regarding how patient may be taking medications differently can be found in the Admit Orders Activity  Prior to Admission Medications   Prescriptions Last Dose Informant Patient Reported?   allopurinol (Zyloprim) 100 mg tablet Unknown Self Yes   Sig: Take 1 tablet (100 mg) by mouth once daily.    Patient not taking    amLODIPine (Norvasc) 10 mg tablet Unknown Self No   Sig: Take 1 tablet (10 mg) by mouth once daily.    Patient not taking    atorvastatin (Lipitor) 80 mg tablet Unknown Self Yes   Sig: Take 1 tablet (80 mg) by mouth once daily at bedtime.    Patient not taking    calcium acetate (Phoslo) 667 mg capsule   No   Sig: Take 3 capsules (2,000 mg) by mouth 3 times a day with meals.    Patient not taking    carvedilol (Coreg) 25 mg tablet 7/23/2024 Self No   Sig: Take 1 tablet (25 mg) by mouth every 12 hours.   cholecalciferol (Vitamin D-3) 50 mcg (2,000 unit) capsule Unknown Self Yes   Sig: Take 1 capsule (50 mcg) by mouth early in the morning..    Patient not taking    colchicine 0.6 mg tablet Unknown Self No   Sig: Take 1 tablet (0.6 mg) by mouth once daily as needed for muscle/joint pain (Acut gout flare x 1-2 days).    Patient not taking    diphenoxylate-atropine (Lomotil) 2.5-0.025 mg tablet Unknown Self No   Sig: Take 1 tablet by mouth once daily as needed for diarrhea.    Patient not taking    hydrALAZINE (Apresoline) 100 mg tablet 7/23/2024 Self No   Sig: Take 1 tablet (100 mg) by mouth 2 times a day.   levETIRAcetam (Keppra) 500 mg tablet Unknown Self No   Sig: Take 1 tablet (500 mg) by mouth once daily. And one additional tablet after dialysis    Patient not taking seems  non compliant last fill 5/18/24 60 pills for 30 days    lidocaine-prilocaine (Emla) 2.5-2.5 % cream Unknown Self No   Sig: Apply thick layer 2 hours prior to dialysis site, cover with plastic wrap.   loperamide (Imodium) 2 mg capsule 7/23/2024 Self No   Sig: Take 1 capsule (2 mg) by mouth once daily as needed for diarrhea.   losartan (Cozaar) 25 mg tablet 7/23/2024 Self No   Sig: Take 1 tablet (25 mg) by mouth once daily.      Facility-Administered Medications: None        The list below reflects the updated allergy list. Please review each documented allergy for additional clarification and justification.  Allergies  Reviewed by Patricia Chavez RN on 7/23/2024        Severity Reactions Comments    Piperacillin-tazobactam-dextrs Not Specified Hives             Patient accepts M2B at discharge.    Sources used to complete the med history include out patient fill history, OARRS, and patient along with telephone transplant note Mer Siegel 6/27/24      Below are additional concerns with the patient's PTA list.  The patient is very non compliant with take medications . Most of his medications have not been filled in 6 months. The levetiracetam  500 mg was last filled on 5/18/24 for 60 pills which is a 30 day supply.     Jeramie Newell [facility]  Transitions of Care Clinical Pharmacist  Please reach out via Epic Chat for questions, if no response call  [phone] or [employer]  [facility]

## 2024-07-24 NOTE — ED PROVIDER NOTES
Emergency Department Provider Note        History of Present Illness     History provided by: Patient, Family Member, and EMS  Limitations to History: None  External Records Reviewed with Brief Summary: Discharge Summary from [facility] which showed patient having symptomatic anemia with hgb of 5.2 requiring blood transfusions as well as maroon colored stools.    HPI:  Soren Howard is a 49 y.o. male with past medical history of end-stage renal disease on Monday Wednesday Friday dialysis secondary to hypertension, chronic alcohol use, recent history of symptomatic anemia requiring blood transfusion.  Patient's wife called EMS today as he was slurring his speech.  When they arrived at his home, fingerstick glucose was 36.  They gave him oral glucose which she did not respond to them and started D10 to which she responded well with repeat glucose of 142.  At that time his speech difficulties went away.  He never had any motor difficulties throughout.  Patient notes is never really happened before, he has been eating less recently low because has been feeling poor.  Of note patient went to [facility] almost 2 weeks ago when he had maroon-colored stools, had hemoglobin of 5.2 and required 2 units of blood transfusion, they recommended admission at that time but patient deferred.  Patient notes he has not been having maroon-colored stools since then.  But he does note when he gets up and walks around it is very hard for him to breathe.  Patient initially saturating 96% on room air, after he went to the bathroom and came back to his bed.  He was short of breath.  I put the pulse ox on which showed oxygenation of 90% on room air which quickly recovered back to greater than 94% after rest.    Physical Exam   Triage vitals:  T 36.3 °C (97.4 °F)  HR 75  /84  RR 20  O2 94 % None (Room air)    Physical Exam  Vitals (of note, patient after returning from bathroom, O2 of 90% briefly which came up to 94% within 20 seconds after  rest) and nursing note reviewed.   Constitutional:       Appearance: He is ill-appearing.   HENT:      Head: Normocephalic and atraumatic.   Cardiovascular:      Rate and Rhythm: Normal rate and regular rhythm.   Pulmonary:      Effort: Pulmonary effort is normal. No respiratory distress.      Breath sounds: Normal breath sounds.   Abdominal:      General: There is distension (abdominal distension without pain, baseline per patient and wife).      Tenderness: There is no abdominal tenderness.   Musculoskeletal:      Right lower leg: Edema (2+ through lower extremities) present.      Left lower leg: Edema (2+ through lower extremities) present.   Neurological:      Mental Status: He is alert and oriented to person, place, and time. Mental status is at baseline.      Coordination: Coordination normal.      Gait: Gait normal.      Comments: Witnessed patient ambulate to and from the bathroom without difficulty          Medical Decision Making & ED Course   Medical Decision Makin y.o. 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  Patient was found to have profound hypoglycemia to 36, he was given glucose which resolved this.  Patient notes he has never had this before however he has been feeling very poorly lately and not being able to eat very much.  He has no history of diabetes that he is aware of, patient also endorsed since leaving [facility] last week that he has not been having any maroon-colored stools anymore.  He does note it is very difficult to breathe after walking.  And he has noticed worsening swelling throughout his body.  Differential is broad and included below, after initial workup is determined that patient was having acidemia as well as anemia.  Patient was consented  for blood transfusion, 2 units of blood ordered.  Additionally I reached out to nephrology to assess if they would want to do more urgent dialysis, patient is having worsening pleural effusion that I personally interpreted on x-ray prior to read.  As well as the increase shortness of breath and transient hypoxia after exertion, it was deemed that patient would be appropriate for admission. Plan for AM dialysis  ----    Differential diagnoses considered include but are not limited to: Symptomatic anemia, chronic alcohol use with malnutrition causing hypoglycemia, bacteremia, hypothyroidism, pneumonia, fluid overload from inadequate dialysis vs new heart failure.     Social Determinants of Health which Significantly Impact Care: None identified     EKG Independent Interpretation: The EKG obtained at 00:27 was independently interpreted by myself. It demonstrates Sinus rhythm with a ventricular rate of 75. Normal axis. Intervals regular. ST segments showed no notable depression or elevations. similar compared to prior EKG from 7/11/2024    Independent Result Review and Interpretation: Chest X-Ray as interpreted by me revealed notable bilateral pleural effusions, worse than on 7/11/2024.    Chronic conditions affecting the patient's care: As documented above in [facility]    The patient was discussed with the following consultants/services: Admission Coordinator who accepted the patient for admission    Care Considerations: As documented above in [facility]    ED Course:  Diagnoses as of 07/24/24 0211   Metabolic acidosis     Disposition   As a result of their workup, the patient will require admission to the hospital.  The patient was informed of his diagnosis.  The patient was given the opportunity to ask questions and I answered them. The patient agreed to be admitted to the hospital.    Procedures   Procedures      Osman Castro DO  Emergency Medicine      Osman Castro DO  Resident  07/24/24 0217

## 2024-07-24 NOTE — ED TRIAGE NOTES
[facility] Per F.ASHLEY, the patient was slurring his words. They checked his BG and it was 35. He received 1 tube of oral glucose, which did not help. He was give 100 mL of D10 which then brought up his BG to 145. No hx of DM. Has port on left side for dialysis. Hx of HTN.

## 2024-07-25 LAB
ALBUMIN SERPL BCP-MCNC: 3 G/DL (ref 3.4–5)
ANION GAP SERPL CALC-SCNC: 22 MMOL/L (ref 10–20)
BLOOD EXPIRATION DATE: NORMAL
BUN SERPL-MCNC: 89 MG/DL (ref 6–23)
CALCIUM SERPL-MCNC: 5.9 MG/DL (ref 8.6–10.6)
CHLORIDE SERPL-SCNC: 102 MMOL/L (ref 98–107)
CO2 SERPL-SCNC: 19 MMOL/L (ref 21–32)
CREAT SERPL-MCNC: 24.21 MG/DL (ref 0.5–1.3)
DISPENSE STATUS: NORMAL
EGFRCR SERPLBLD CKD-EPI 2021: 2 ML/MIN/1.73M*2
ERYTHROCYTE [DISTWIDTH] IN BLOOD BY AUTOMATED COUNT: 15.8 % (ref 11.5–14.5)
ERYTHROCYTE [DISTWIDTH] IN BLOOD BY AUTOMATED COUNT: 15.9 % (ref 11.5–14.5)
GLUCOSE SERPL-MCNC: 82 MG/DL (ref 74–99)
HCT VFR BLD AUTO: 20.7 % (ref 41–52)
HCT VFR BLD AUTO: 21.9 % (ref 41–52)
HGB BLD-MCNC: 6.6 G/DL (ref 13.5–17.5)
HGB BLD-MCNC: 7 G/DL (ref 13.5–17.5)
MCH RBC QN AUTO: 28 PG (ref 26–34)
MCH RBC QN AUTO: 28.4 PG (ref 26–34)
MCHC RBC AUTO-ENTMCNC: 31.9 G/DL (ref 32–36)
MCHC RBC AUTO-ENTMCNC: 32 G/DL (ref 32–36)
MCV RBC AUTO: 88 FL (ref 80–100)
MCV RBC AUTO: 89 FL (ref 80–100)
NRBC BLD-RTO: 0 /100 WBCS (ref 0–0)
NRBC BLD-RTO: 0.5 /100 WBCS (ref 0–0)
PHOSPHATE SERPL-MCNC: 9.6 MG/DL (ref 2.5–4.9)
PLATELET # BLD AUTO: 168 X10*3/UL (ref 150–450)
PLATELET # BLD AUTO: 193 X10*3/UL (ref 150–450)
POTASSIUM SERPL-SCNC: 4.1 MMOL/L (ref 3.5–5.3)
PRODUCT BLOOD TYPE: 9500
PRODUCT CODE: NORMAL
RBC # BLD AUTO: 2.32 X10*6/UL (ref 4.5–5.9)
RBC # BLD AUTO: 2.5 X10*6/UL (ref 4.5–5.9)
SODIUM SERPL-SCNC: 139 MMOL/L (ref 136–145)
UNIT ABO: NORMAL
UNIT NUMBER: NORMAL
UNIT RH: NORMAL
UNIT VOLUME: 350
WBC # BLD AUTO: 5.3 X10*3/UL (ref 4.4–11.3)
WBC # BLD AUTO: 5.8 X10*3/UL (ref 4.4–11.3)
XM INTEP: NORMAL

## 2024-07-25 PROCEDURE — 2500000001 HC RX 250 WO HCPCS SELF ADMINISTERED DRUGS (ALT 637 FOR MEDICARE OP): Performed by: STUDENT IN AN ORGANIZED HEALTH CARE EDUCATION/TRAINING PROGRAM

## 2024-07-25 PROCEDURE — 85027 COMPLETE CBC AUTOMATED: CPT | Performed by: STUDENT IN AN ORGANIZED HEALTH CARE EDUCATION/TRAINING PROGRAM

## 2024-07-25 PROCEDURE — 99233 SBSQ HOSP IP/OBS HIGH 50: CPT | Performed by: NURSE PRACTITIONER

## 2024-07-25 PROCEDURE — 80069 RENAL FUNCTION PANEL: CPT | Performed by: STUDENT IN AN ORGANIZED HEALTH CARE EDUCATION/TRAINING PROGRAM

## 2024-07-25 PROCEDURE — P9040 RBC LEUKOREDUCED IRRADIATED: HCPCS

## 2024-07-25 PROCEDURE — 2500000001 HC RX 250 WO HCPCS SELF ADMINISTERED DRUGS (ALT 637 FOR MEDICARE OP): Performed by: NURSE PRACTITIONER

## 2024-07-25 PROCEDURE — 1210000001 HC SEMI-PRIVATE ROOM DAILY

## 2024-07-25 PROCEDURE — 36415 COLL VENOUS BLD VENIPUNCTURE: CPT | Performed by: STUDENT IN AN ORGANIZED HEALTH CARE EDUCATION/TRAINING PROGRAM

## 2024-07-25 PROCEDURE — 99233 SBSQ HOSP IP/OBS HIGH 50: CPT | Performed by: STUDENT IN AN ORGANIZED HEALTH CARE EDUCATION/TRAINING PROGRAM

## 2024-07-25 PROCEDURE — 36430 TRANSFUSION BLD/BLD COMPNT: CPT

## 2024-07-25 RX ORDER — PANTOPRAZOLE SODIUM 40 MG/1
40 TABLET, DELAYED RELEASE ORAL
Status: DISCONTINUED | OUTPATIENT
Start: 2024-07-25 | End: 2024-07-26 | Stop reason: HOSPADM

## 2024-07-25 RX ORDER — CALCIUM ACETATE 667 MG/1
2001 CAPSULE ORAL
Status: DISCONTINUED | OUTPATIENT
Start: 2024-07-25 | End: 2024-07-26 | Stop reason: HOSPADM

## 2024-07-25 ASSESSMENT — ACTIVITIES OF DAILY LIVING (ADL): LACK_OF_TRANSPORTATION: NO

## 2024-07-25 ASSESSMENT — PAIN SCALES - GENERAL
PAINLEVEL_OUTOF10: 0 - NO PAIN

## 2024-07-25 ASSESSMENT — PAIN - FUNCTIONAL ASSESSMENT
PAIN_FUNCTIONAL_ASSESSMENT: 0-10
PAIN_FUNCTIONAL_ASSESSMENT: 0-10

## 2024-07-25 NOTE — PROGRESS NOTES
07/25/24 0934   Discharge Planning   Living Arrangements Spouse/significant other   Support Systems Spouse/significant other   Assistance Needed None   Type of Residence Private residence   Do you have animals or pets at home? No   Who is requesting discharge planning? Patient   Home or Post Acute Services None   Expected Discharge Disposition Home   Does the patient need discharge transport arranged? No  (Wife will provide.)   Financial Resource Strain   How hard is it for you to pay for the very basics like food, housing, medical care, and heating? Not very   Housing Stability   In the last 12 months, was there a time when you were not able to pay the mortgage or rent on time? N   At any time in the past 12 months, were you homeless or living in a shelter (including now)? N   Transportation Needs   In the past 12 months, has lack of transportation kept you from medical appointments or from getting medications? no   In the past 12 months, has lack of transportation kept you from meetings, work, or from getting things needed for daily living? No     Assessment Note:  Met with pt and introduced myself as care coordinator and member of the Care Transitions team for discharge planning.   Pt was independent prior to admission.  Pt drives to [place].  Pt's address, phone number and contact information was verified.  Pt does not have any questions/concerns at this time.     Previous Home Care: None  DME: None  Pharmacy: [facility] in [place].  Falls: Denies   PCP:  None.   Dialysis:  [facility] on MWF, chair time 1030am.  Pt drives to dialysis.  Pt's nephrologist is  Dr. Charles (seen recently).    Martita Perez MSN, RN-BC  Transitional Care Coordinator (TCC)  [phone]

## 2024-07-25 NOTE — CARE PLAN
The patient's goals for the shift include      The clinical goals for the shift include patient will be free from injury    Problem: Fall/Injury  Goal: Not fall by end of shift  Outcome: Progressing  Goal: Be free from injury by end of the shift  Outcome: Progressing

## 2024-07-25 NOTE — CARE PLAN
The patient's goals for the shift include      The clinical goals for the shift include patient will remain free of falls    Over the shift, the patient did not make progress toward the following goals. Barriers to progression include . Recommendations to address these barriers include .

## 2024-07-25 NOTE — PROGRESS NOTES
"Soren Howard is a 49 y.o. male on hospital day 1 of admission presenting with Metabolic acidosis.    Subjective     The  patient was seen and examined at bedside. He states he is feeling much better today. Hemoglobin this afternoon dropped below 7 again, will receive 1 more unit.    Objective     GENERAL APPEARANCE: A&Ox3, appears in no acute distress  HEAD: normocephalic, atraumatic  THROAT: Oral cavity and pharynx normal. No inflammation, swelling, exudate, or lesions.  NECK: Neck supple, non-tender without lymphadenopathy, masses or thyromegaly.  CARDIAC: Normal S1 and S2. No S3, S4 or murmurs. Rhythm is regular. There is no peripheral edema, cyanosis or pallor. Extremities are warm and well perfused. No carotid bruits.  LUNGS: Clear to auscultation bilaterally without rales, rhonchi, wheezing or diminished breath sounds.  ABDOMEN: Positive bowel sounds. Soft, nondistended, nontender. No guarding or rebound. No masses.  EXTREMITIES: No significant deformity or joint abnormality. No edema. Peripheral pulses intact. No varicosities.  SKIN: Skin normal color, texture and turgor with no lesions or rash  PSYCHIATRIC: oriented to person, place, and time, good judgement and reason, without hallucinations, abnormal affect or abnormal behaviors during the examination. Patient is not suicidal.        Last Recorded Vitals  Blood pressure 132/87, pulse 82, temperature 37.2 °C (99 °F), temperature source Tympanic, resp. rate 20, height 1.702 m (5' 7\"), weight 93.2 kg (205 lb 7.5 oz), SpO2 94%.  Intake/Output last 3 Shifts:  I/O last 3 completed shifts:  In: 3004 (32.2 mL/kg) [I.V.:600 (6.4 mL/kg); Blood:2004; Other:400]  Out: 3400 (36.5 mL/kg) [Other:3400]  Weight: 93.2 kg     Relevant Results  Lab Results   Component Value Date    WBC 5.8 07/25/2024    HGB 6.6 (L) 07/25/2024    HCT 20.7 (L) 07/25/2024    MCV 89 07/25/2024     07/25/2024      Lab Results   Component Value Date    GLUCOSE 82 07/25/2024    CALCIUM 5.9 " (L) 07/25/2024     07/25/2024    K 4.1 07/25/2024    CO2 19 (L) 07/25/2024     07/25/2024    BUN 89 (H) 07/25/2024    CREATININE 24.21 (H) 07/25/2024     Scheduled medications  allopurinol, 100 mg, oral, Daily  amLODIPine, 10 mg, oral, Daily  atorvastatin, 80 mg, oral, Nightly  B complex-vitamin C-folic acid, 1 capsule, oral, Daily  calcium acetate, 2,001 mg, oral, TID  carvedilol, 25 mg, oral, q12h  epoetin tavia or biosimilar, 20,000 Units, intravenous, Once per day on Monday Wednesday Friday  hydrALAZINE, 100 mg, oral, BID  levETIRAcetam, 500 mg, oral, Daily  losartan, 25 mg, oral, Daily  melatonin, 6 mg, oral, Nightly  pantoprazole, 40 mg, oral, Daily before breakfast  paricalcitol, 2 mcg, intravenous, Once per day on Monday Wednesday Friday      Continuous medications     PRN medications  PRN medications: acetaminophen **OR** acetaminophen **OR** acetaminophen, melatonin, polyethylene glycol    Assessment/Plan     Principal Problem:    Metabolic acidosis        Severe anemia  - Normotensive, no tachycardia  - Suspect 2/2 ESRD, could be exaggerated ISO hypervolemia  - Given 2 unit pRBC in AM  - Recently seen at [facility] and left AMA for similar  - No DVT chemoppx, SCDs  - Protonix IV BID for now  - No evidence of GI bleed   - Patient hemoglobin barely breaking 7.0 this morning and on repeat fell back below 7, will transfuse 1 unit PRBC     ESRD on HD  Hypervolemia  Uremia  Metabolic acidosis, resolved  - CXR with large right pleural effusion and increased perihilar interstitial markings  - RFP suggestive of missed HD  - Suspect hypervolemia ISO missed HD   -Nephrology evaluated patient in ED  - HCO3 given in ED  - Further electrolyte and acid/base balance per nephrology  - HD tomorrow     Seizure disorder  - Continue Keppra  - Fall, seizure precautions     HTN  HLD  - Chronic  - Continue home medications      Code status: FULL CODE  Dispo: Transfuse 1 unit PRBC    Trae Hankins MD     The patient  encounter includes all but not limited to; Evaluation of laboratory results, pertinent imaging, and vital signs. Daily updates are discussed with any consulting services and family/medical power of  as needed. The patient's discharge  process begins at admission and daily contact with the patient's TCC and SW is pertinent in their efficient and safe discharge.

## 2024-07-25 NOTE — PROGRESS NOTES
"Soren Howard is a 49 y.o. male on day 1 of admission presenting with Metabolic acidosis.    Subjective   Pt resting in bed. Denies sob, n/v/d, fever, cough, chills, chest pains, pain, constipation, light head, dizziness, headache       Objective     Physical Exam  Vitals and nursing note reviewed.   Cardiovascular:      Rate and Rhythm: Normal rate and regular rhythm.      Comments: SR 79  Pulmonary:      Comments: Jos lung sounds dim  Abdominal:      General: There is distension.      Comments: Slight dist=non-tender to palpation   Genitourinary:     Comments: States make urine  Musculoskeletal:      Comments: Ble edema more noted to ankles +1pitting   Skin:     General: Skin is warm and dry.   Neurological:      Mental Status: He is alert and oriented to person, place, and time.   Psychiatric:         Mood and Affect: Mood normal.         Behavior: Behavior normal.         Last Recorded Vitals  Blood pressure 132/87, pulse 82, temperature 37.2 °C (99 °F), temperature source Tympanic, resp. rate 20, height 1.702 m (5' 7\"), weight 93.2 kg (205 lb 7.5 oz), SpO2 94%.  Intake/Output last 3 Shifts:  I/O last 3 completed shifts:  In: 3004 (32.2 mL/kg) [I.V.:600 (6.4 mL/kg); Blood:2004; Other:400]  Out: 3400 (36.5 mL/kg) [Other:3400]  Weight: 93.2 kg     Relevant Results    Scheduled medications  allopurinol, 100 mg, oral, Daily  amLODIPine, 10 mg, oral, Daily  atorvastatin, 80 mg, oral, Nightly  B complex-vitamin C-folic acid, 1 capsule, oral, Daily  calcium acetate, 2,001 mg, oral, TID  carvedilol, 25 mg, oral, q12h  epoetin tavia or biosimilar, 20,000 Units, intravenous, Once per day on Monday Wednesday Friday  hydrALAZINE, 100 mg, oral, BID  levETIRAcetam, 500 mg, oral, Daily  losartan, 25 mg, oral, Daily  melatonin, 6 mg, oral, Nightly  pantoprazole, 40 mg, oral, Daily before breakfast  paricalcitol, 2 mcg, intravenous, Once per day on Monday Wednesday Friday      Continuous medications     PRN medications  PRN " medications: acetaminophen **OR** acetaminophen **OR** acetaminophen, melatonin, polyethylene glycol   Results for orders placed or performed during the hospital encounter of 07/23/24 (from the past 24 hour(s))   CBC   Result Value Ref Range    WBC 5.3 4.4 - 11.3 x10*3/uL    nRBC 0.0 0.0 - 0.0 /100 WBCs    RBC 2.50 (L) 4.50 - 5.90 x10*6/uL    Hemoglobin 7.0 (L) 13.5 - 17.5 g/dL    Hematocrit 21.9 (L) 41.0 - 52.0 %    MCV 88 80 - 100 fL    MCH 28.0 26.0 - 34.0 pg    MCHC 32.0 32.0 - 36.0 g/dL    RDW 15.8 (H) 11.5 - 14.5 %    Platelets 193 150 - 450 x10*3/uL   Renal Function Panel   Result Value Ref Range    Glucose 82 74 - 99 mg/dL    Sodium 139 136 - 145 mmol/L    Potassium 4.1 3.5 - 5.3 mmol/L    Chloride 102 98 - 107 mmol/L    Bicarbonate 19 (L) 21 - 32 mmol/L    Anion Gap 22 (H) 10 - 20 mmol/L    Urea Nitrogen 89 (H) 6 - 23 mg/dL    Creatinine 24.21 (H) 0.50 - 1.30 mg/dL    eGFR 2 (L) >60 mL/min/1.73m*2    Calcium 5.9 (L) 8.6 - 10.6 mg/dL    Phosphorus 9.6 (H) 2.5 - 4.9 mg/dL    Albumin 3.0 (L) 3.4 - 5.0 g/dL   CBC   Result Value Ref Range    WBC 5.8 4.4 - 11.3 x10*3/uL    nRBC 0.5 (H) 0.0 - 0.0 /100 WBCs    RBC 2.32 (L) 4.50 - 5.90 x10*6/uL    Hemoglobin 6.6 (L) 13.5 - 17.5 g/dL    Hematocrit 20.7 (L) 41.0 - 52.0 %    MCV 89 80 - 100 fL    MCH 28.4 26.0 - 34.0 pg    MCHC 31.9 (L) 32.0 - 36.0 g/dL    RDW 15.9 (H) 11.5 - 14.5 %    Platelets 168 150 - 450 x10*3/uL                         Assessment/Plan   Principal Problem:    Metabolic acidosis    Tolerated hemodialysis yesterday with net fluid loss 2.6L    Bp elevated with tx (136//101), euvolemic on exam and has stable electrolytes . K+=4.1     Outpatient Dialysis schedule:   [facility]/Dr Charles     Access: lt fist +thrill/bruit- no issues - able to achieve       Anemia of ESRD: epoetin tavia (Epogen,Procrit) injection 20,000 Units on dialysis days.. current hgb 6.6.. will cont to monitor.. s/p 1 unit blood       CKD-MBD Phosphate Binder:B  complex-vitamin C-folic acid (Nephrocaps) capsule 1 capsule daily, calcium acetate (Phoslo) capsule 2,001 mg tid ac, paricalcitoL (Zemplar) injection 2 mcg MWF     Plan HD tomorrow with UF as tolerated     Renal diet      Please obtain daily standing wt (if possible)     Medication to be adjusted for ESRD      Patient to continue regular HD schedule while inpatient and to follow with the outpatient nephrologist at discharge        ROJAS Alford-CNP

## 2024-07-25 NOTE — CARE PLAN
Problem: Fall/Injury  Goal: Not fall by end of shift  Outcome: Progressing  Goal: Be free from injury by end of the shift  Outcome: Progressing  Goal: Verbalize understanding of personal risk factors for fall in the hospital  Outcome: Progressing  Goal: Verbalize understanding of risk factor reduction measures to prevent injury from fall in the home  Outcome: Progressing  Goal: Use assistive devices by end of the shift  Outcome: Progressing  Goal: Pace activities to prevent fatigue by end of the shift  Outcome: Progressing   The patient's goals for the shift include      The clinical goals for the shift include patient will remain free of falls

## 2024-07-26 ENCOUNTER — APPOINTMENT (OUTPATIENT)
Dept: DIALYSIS | Facility: HOSPITAL | Age: 49
End: 2024-07-26
Payer: COMMERCIAL

## 2024-07-26 VITALS
TEMPERATURE: 97.9 F | HEART RATE: 92 BPM | WEIGHT: 205.47 LBS | RESPIRATION RATE: 16 BRPM | BODY MASS INDEX: 32.25 KG/M2 | HEIGHT: 67 IN | SYSTOLIC BLOOD PRESSURE: 169 MMHG | OXYGEN SATURATION: 91 % | DIASTOLIC BLOOD PRESSURE: 98 MMHG

## 2024-07-26 LAB
ALBUMIN SERPL BCP-MCNC: 2.8 G/DL (ref 3.4–5)
ANION GAP SERPL CALC-SCNC: 25 MMOL/L (ref 10–20)
BUN SERPL-MCNC: 97 MG/DL (ref 6–23)
CALCIUM SERPL-MCNC: 5.6 MG/DL (ref 8.6–10.6)
CHLORIDE SERPL-SCNC: 104 MMOL/L (ref 98–107)
CO2 SERPL-SCNC: 17 MMOL/L (ref 21–32)
CREAT SERPL-MCNC: >25 MG/DL (ref 0.5–1.3)
EGFRCR SERPLBLD CKD-EPI 2021: ABNORMAL ML/MIN/{1.73_M2}
ERYTHROCYTE [DISTWIDTH] IN BLOOD BY AUTOMATED COUNT: 15.5 % (ref 11.5–14.5)
GLUCOSE BLD MANUAL STRIP-MCNC: 76 MG/DL (ref 74–99)
GLUCOSE SERPL-MCNC: 75 MG/DL (ref 74–99)
HCT VFR BLD AUTO: 22.9 % (ref 41–52)
HGB BLD-MCNC: 7.3 G/DL (ref 13.5–17.5)
MCH RBC QN AUTO: 27.8 PG (ref 26–34)
MCHC RBC AUTO-ENTMCNC: 31.9 G/DL (ref 32–36)
MCV RBC AUTO: 87 FL (ref 80–100)
NRBC BLD-RTO: 0.5 /100 WBCS (ref 0–0)
PHOSPHATE SERPL-MCNC: 9.6 MG/DL (ref 2.5–4.9)
PLATELET # BLD AUTO: 171 X10*3/UL (ref 150–450)
POTASSIUM SERPL-SCNC: 3.7 MMOL/L (ref 3.5–5.3)
RBC # BLD AUTO: 2.63 X10*6/UL (ref 4.5–5.9)
SODIUM SERPL-SCNC: 142 MMOL/L (ref 136–145)
WBC # BLD AUTO: 6.5 X10*3/UL (ref 4.4–11.3)

## 2024-07-26 PROCEDURE — 82947 ASSAY GLUCOSE BLOOD QUANT: CPT

## 2024-07-26 PROCEDURE — 2500000004 HC RX 250 GENERAL PHARMACY W/ HCPCS (ALT 636 FOR OP/ED): Performed by: INTERNAL MEDICINE

## 2024-07-26 PROCEDURE — 2500000001 HC RX 250 WO HCPCS SELF ADMINISTERED DRUGS (ALT 637 FOR MEDICARE OP): Performed by: STUDENT IN AN ORGANIZED HEALTH CARE EDUCATION/TRAINING PROGRAM

## 2024-07-26 PROCEDURE — 85027 COMPLETE CBC AUTOMATED: CPT | Performed by: STUDENT IN AN ORGANIZED HEALTH CARE EDUCATION/TRAINING PROGRAM

## 2024-07-26 PROCEDURE — 6350000001 HC RX 635 EPOETIN >10,000 UNITS: Performed by: INTERNAL MEDICINE

## 2024-07-26 PROCEDURE — 8010000001 HC DIALYSIS - HEMODIALYSIS PER DAY

## 2024-07-26 PROCEDURE — 80069 RENAL FUNCTION PANEL: CPT | Performed by: STUDENT IN AN ORGANIZED HEALTH CARE EDUCATION/TRAINING PROGRAM

## 2024-07-26 PROCEDURE — 2500000001 HC RX 250 WO HCPCS SELF ADMINISTERED DRUGS (ALT 637 FOR MEDICARE OP): Performed by: NURSE PRACTITIONER

## 2024-07-26 PROCEDURE — 36415 COLL VENOUS BLD VENIPUNCTURE: CPT | Performed by: STUDENT IN AN ORGANIZED HEALTH CARE EDUCATION/TRAINING PROGRAM

## 2024-07-26 PROCEDURE — 90935 HEMODIALYSIS ONE EVALUATION: CPT | Performed by: INTERNAL MEDICINE

## 2024-07-26 RX ORDER — LEVETIRACETAM 500 MG/1
500 TABLET ORAL DAILY
Qty: 30 TABLET | Refills: 1 | Status: SHIPPED | OUTPATIENT
Start: 2024-07-26 | End: 2024-09-24

## 2024-07-26 RX ORDER — AMLODIPINE BESYLATE 10 MG/1
10 TABLET ORAL DAILY
Qty: 30 TABLET | Refills: 1 | Status: SHIPPED | OUTPATIENT
Start: 2024-07-26 | End: 2024-09-24

## 2024-07-26 RX ORDER — PANTOPRAZOLE SODIUM 40 MG/1
40 TABLET, DELAYED RELEASE ORAL
Qty: 30 TABLET | Refills: 1 | Status: SHIPPED | OUTPATIENT
Start: 2024-07-27 | End: 2024-09-25

## 2024-07-26 RX ORDER — LOSARTAN POTASSIUM 25 MG/1
25 TABLET ORAL DAILY
Qty: 30 TABLET | Refills: 1 | Status: SHIPPED | OUTPATIENT
Start: 2024-07-26 | End: 2024-09-24

## 2024-07-26 ASSESSMENT — PAIN SCALES - GENERAL
PAINLEVEL_OUTOF10: 0 - NO PAIN
PAINLEVEL_OUTOF10: 0 - NO PAIN

## 2024-07-26 ASSESSMENT — PAIN - FUNCTIONAL ASSESSMENT
PAIN_FUNCTIONAL_ASSESSMENT: 0-10
PAIN_FUNCTIONAL_ASSESSMENT: NO/DENIES PAIN

## 2024-07-26 NOTE — NURSING NOTE
Report to Receiving RN:    Report To: Toro RN  Time Report Called: [id number]  Hand-Off Communication: Pt tolerated HD treatment well with 2L fluid removal. Post bp: 186/105 p: 91  Complications During Treatment: No  Ultrafiltration Treatment: Yes  Medications Administered During Dialysis: No  Blood Products Administered During Dialysis: No  Labs Sent During Dialysis: No  Heparin Drip Rate Changes: N/A  Dialysis Catheter Dressing: fistula  Last Dressing Change:       Last Updated: 12:48 PM by KEVIN APRIL

## 2024-07-26 NOTE — PROGRESS NOTES
Renal Staff HD Note    Patient seen, examined on dialysis.  Blood pressure is high.  Tolerating treatment without event    BP Readings from Last 3 Encounters:   07/26/24 (!) 160/101   07/12/24 (!) 152/101   03/10/24 (!) 151/95     [unfilled]  Lab Results   Component Value Date    CREATININE >25.00 (H) 07/26/2024    BUN 97 (HH) 07/26/2024     07/26/2024    K 3.7 07/26/2024     07/26/2024    CO2 17 (L) 07/26/2024     Lab Results   Component Value Date    .2 (H) 08/29/2021    CALCIUM 5.6 (LL) 07/26/2024    CAION 0.64 (LL) 07/24/2024    PHOS 9.6 (H) 07/26/2024     @  Lab Results   Component Value Date    HGB 7.3 (L) 07/26/2024     -Replace losartan with valsartan 160 mg once a day  -Change hydralazine dose to 75 mg 3 times daily  - Continue treatment per submitted orders

## 2024-07-26 NOTE — PROGRESS NOTES
07/26/24 1149   Discharge Planning   Home or Post Acute Services None   Expected Discharge Disposition Home   Does the patient need discharge transport arranged? No       Patient will discharge to home. No discharge needs stated. Spouse will provide transport home.

## 2024-07-26 NOTE — NURSING NOTE
Upon arrival at the patients bedside the patient was AO+4, vss, up ambulating from the bathroom with family at bedside and 1 unit of PRBC nearing the end. The patient was requesting to be discharged AMA. After coaxing and educating the patient /decided to stay until after hd session 7/26. Head to toe findings recorded, gcs=15, msp=4, no noted distress, rounding ensued , continuous ecg monitoring, Is education patient remained free of distress care transferred with the patient ambulating to the [facility] for HD this morning.    Resulted

## 2024-07-26 NOTE — NURSING NOTE
Patient discharged to home this evening. He verbalized an understanding of the AVS after review with this nurse. He packed all his belongings. His IV was removed with tip intact. His prescriptions were sent to [facility] pharmacy. His wife picked him up to go home.

## 2024-07-26 NOTE — NURSING NOTE
Report from Sending RN:    Report From: Sanjuanita  Recent Surgery of Procedure: No  Baseline Level of Consciousness (LOC): A and O x 4  Oxygen Use: No  Type:   Diabetic: No  Last BP Med Given Day of Dialysis: Last night  Last Pain Med Given: None  Lab Tests to be Obtained with Dialysis: Yes  Blood Transfusion to be Given During Dialysis: No  Available IV Access: Yes  Medications to be Administered During Dialysis: No  Continuous IV Infusion Running: No  Restraints on Currently or in the Last 24 Hours: No  Hand-Off Communication: Is wanting to leave AMA  Dialysis Catheter Dressing: NA  Last Dressing Change: NA

## 2024-07-28 LAB
BACTERIA BLD CULT: NORMAL
BACTERIA BLD CULT: NORMAL

## 2024-08-06 ENCOUNTER — APPOINTMENT (OUTPATIENT)
Dept: GASTROENTEROLOGY | Facility: CLINIC | Age: 49
End: 2024-08-06
Payer: COMMERCIAL

## 2024-08-12 ENCOUNTER — LAB REQUISITION (OUTPATIENT)
Dept: LAB | Facility: HOSPITAL | Age: 49
End: 2024-08-12
Payer: COMMERCIAL

## 2024-08-12 LAB — HGB BLD-MCNC: 7.2 G/DL (ref 13.5–17.5)

## 2024-08-12 PROCEDURE — 85018 HEMOGLOBIN: CPT

## 2024-08-20 ENCOUNTER — APPOINTMENT (OUTPATIENT)
Dept: CARDIOLOGY | Facility: HOSPITAL | Age: 49
End: 2024-08-20
Payer: COMMERCIAL

## 2024-08-20 ENCOUNTER — APPOINTMENT (OUTPATIENT)
Dept: RADIOLOGY | Facility: HOSPITAL | Age: 49
End: 2024-08-20
Payer: COMMERCIAL

## 2024-08-20 ENCOUNTER — HOSPITAL ENCOUNTER (OUTPATIENT)
Facility: HOSPITAL | Age: 49
Setting detail: OBSERVATION
Discharge: LEFT BEFORE DISCHARGE | End: 2024-08-20
Attending: EMERGENCY MEDICINE | Admitting: INTERNAL MEDICINE
Payer: COMMERCIAL

## 2024-08-20 VITALS
BODY MASS INDEX: 32.8 KG/M2 | OXYGEN SATURATION: 100 % | DIASTOLIC BLOOD PRESSURE: 113 MMHG | HEIGHT: 67 IN | SYSTOLIC BLOOD PRESSURE: 168 MMHG | WEIGHT: 209 LBS | HEART RATE: 74 BPM | RESPIRATION RATE: 17 BRPM

## 2024-08-20 DIAGNOSIS — I10 HYPERTENSION, UNSPECIFIED TYPE: ICD-10-CM

## 2024-08-20 DIAGNOSIS — R09.02 HYPOXIA: Primary | ICD-10-CM

## 2024-08-20 PROBLEM — R78.81 BACTEREMIA ASSOCIATED WITH INTRAVASCULAR LINE (CMS-HCC): Status: ACTIVE | Noted: 2024-08-20

## 2024-08-20 PROBLEM — K57.90 DIVERTICULAR DISEASE: Status: ACTIVE | Noted: 2024-08-20

## 2024-08-20 PROBLEM — K31.819 ARTERIOVENOUS MALFORMATION OF STOMACH: Status: ACTIVE | Noted: 2024-08-20

## 2024-08-20 PROBLEM — I16.0 HYPERTENSIVE URGENCY: Status: ACTIVE | Noted: 2024-08-20

## 2024-08-20 PROBLEM — E78.2 MIXED HYPERLIPIDEMIA: Status: ACTIVE | Noted: 2019-06-01

## 2024-08-20 PROBLEM — J81.0 ACUTE PULMONARY EDEMA (MULTI): Status: ACTIVE | Noted: 2024-03-28

## 2024-08-20 PROBLEM — T82.7XXA BACTEREMIA ASSOCIATED WITH INTRAVASCULAR LINE (CMS-HCC): Status: ACTIVE | Noted: 2024-08-20

## 2024-08-20 PROBLEM — E55.9 VITAMIN D DEFICIENCY: Status: ACTIVE | Noted: 2024-08-20

## 2024-08-20 PROBLEM — K55.20 AVM (ARTERIOVENOUS MALFORMATION) OF COLON: Status: ACTIVE | Noted: 2024-08-20

## 2024-08-20 PROBLEM — Z91.148 NONCOMPLIANCE WITH MEDICATION REGIMEN: Chronic | Status: ACTIVE | Noted: 2019-06-01

## 2024-08-20 PROBLEM — M10.9 GOUT, UNSPECIFIED: Status: ACTIVE | Noted: 2024-08-20

## 2024-08-20 PROBLEM — K92.2 GIB (GASTROINTESTINAL BLEEDING): Status: ACTIVE | Noted: 2024-08-20

## 2024-08-20 PROBLEM — I73.9 PAD (PERIPHERAL ARTERY DISEASE) (CMS-HCC): Status: ACTIVE | Noted: 2024-08-20

## 2024-08-20 PROBLEM — A04.9 INTESTINAL INFECTION DUE TO BACTERIA CAUSING BLOODY DIARRHEA: Status: ACTIVE | Noted: 2024-08-20

## 2024-08-20 PROBLEM — K21.9 GASTROESOPHAGEAL REFLUX DISEASE WITHOUT ESOPHAGITIS: Status: ACTIVE | Noted: 2019-06-01

## 2024-08-20 PROBLEM — M19.90 OSTEOARTHRITIS: Status: ACTIVE | Noted: 2024-08-20

## 2024-08-20 PROBLEM — Z86.2 HISTORY OF ANEMIA: Status: ACTIVE | Noted: 2024-08-20

## 2024-08-20 PROBLEM — K64.8 INTERNAL HEMORRHOIDS: Status: ACTIVE | Noted: 2024-08-20

## 2024-08-20 LAB
ABO GROUP (TYPE) IN BLOOD: NORMAL
ALBUMIN SERPL BCP-MCNC: 3.4 G/DL (ref 3.4–5)
ALP SERPL-CCNC: 79 U/L (ref 33–120)
ALT SERPL W P-5'-P-CCNC: 10 U/L (ref 10–52)
ANION GAP SERPL CALC-SCNC: 28 MMOL/L (ref 10–20)
ANTIBODY SCREEN: NORMAL
AST SERPL W P-5'-P-CCNC: 15 U/L (ref 9–39)
BASOPHILS # BLD AUTO: 0.04 X10*3/UL (ref 0–0.1)
BASOPHILS NFR BLD AUTO: 0.4 %
BILIRUB SERPL-MCNC: 0.3 MG/DL (ref 0–1.2)
BUN SERPL-MCNC: 92 MG/DL (ref 6–23)
CALCIUM SERPL-MCNC: 6.9 MG/DL (ref 8.6–10.3)
CARDIAC TROPONIN I PNL SERPL HS: 58 NG/L (ref 0–20)
CARDIAC TROPONIN I PNL SERPL HS: 88 NG/L (ref 0–20)
CHLORIDE SERPL-SCNC: 104 MMOL/L (ref 98–107)
CO2 SERPL-SCNC: 16 MMOL/L (ref 21–32)
CREAT SERPL-MCNC: >25 MG/DL (ref 0.5–1.3)
EGFRCR SERPLBLD CKD-EPI 2021: ABNORMAL ML/MIN/{1.73_M2}
EOSINOPHIL # BLD AUTO: 0.29 X10*3/UL (ref 0–0.7)
EOSINOPHIL NFR BLD AUTO: 3.2 %
ERYTHROCYTE [DISTWIDTH] IN BLOOD BY AUTOMATED COUNT: 16.2 % (ref 11.5–14.5)
GLUCOSE SERPL-MCNC: 106 MG/DL (ref 74–99)
HCT VFR BLD AUTO: 25.8 % (ref 41–52)
HGB BLD-MCNC: 7.8 G/DL (ref 13.5–17.5)
IMM GRANULOCYTES # BLD AUTO: 0.06 X10*3/UL (ref 0–0.7)
IMM GRANULOCYTES NFR BLD AUTO: 0.7 % (ref 0–0.9)
LYMPHOCYTES # BLD AUTO: 0.67 X10*3/UL (ref 1.2–4.8)
LYMPHOCYTES NFR BLD AUTO: 7.4 %
MCH RBC QN AUTO: 28.1 PG (ref 26–34)
MCHC RBC AUTO-ENTMCNC: 30.2 G/DL (ref 32–36)
MCV RBC AUTO: 93 FL (ref 80–100)
MONOCYTES # BLD AUTO: 0.79 X10*3/UL (ref 0.1–1)
MONOCYTES NFR BLD AUTO: 8.7 %
NEUTROPHILS # BLD AUTO: 7.19 X10*3/UL (ref 1.2–7.7)
NEUTROPHILS NFR BLD AUTO: 79.6 %
NRBC BLD-RTO: 0 /100 WBCS (ref 0–0)
PLATELET # BLD AUTO: 258 X10*3/UL (ref 150–450)
POTASSIUM SERPL-SCNC: 4.4 MMOL/L (ref 3.5–5.3)
PROT SERPL-MCNC: 6.8 G/DL (ref 6.4–8.2)
RBC # BLD AUTO: 2.78 X10*6/UL (ref 4.5–5.9)
RH FACTOR (ANTIGEN D): NORMAL
SODIUM SERPL-SCNC: 144 MMOL/L (ref 136–145)
WBC # BLD AUTO: 9 X10*3/UL (ref 4.4–11.3)

## 2024-08-20 PROCEDURE — 36415 COLL VENOUS BLD VENIPUNCTURE: CPT | Performed by: EMERGENCY MEDICINE

## 2024-08-20 PROCEDURE — 84484 ASSAY OF TROPONIN QUANT: CPT | Performed by: EMERGENCY MEDICINE

## 2024-08-20 PROCEDURE — 96375 TX/PRO/DX INJ NEW DRUG ADDON: CPT

## 2024-08-20 PROCEDURE — 71045 X-RAY EXAM CHEST 1 VIEW: CPT | Performed by: RADIOLOGY

## 2024-08-20 PROCEDURE — 2500000004 HC RX 250 GENERAL PHARMACY W/ HCPCS (ALT 636 FOR OP/ED): Performed by: EMERGENCY MEDICINE

## 2024-08-20 PROCEDURE — 2500000005 HC RX 250 GENERAL PHARMACY W/O HCPCS: Performed by: EMERGENCY MEDICINE

## 2024-08-20 PROCEDURE — 2060000001 HC INTERMEDIATE ICU ROOM DAILY

## 2024-08-20 PROCEDURE — 99285 EMERGENCY DEPT VISIT HI MDM: CPT | Mod: 25

## 2024-08-20 PROCEDURE — G0378 HOSPITAL OBSERVATION PER HR: HCPCS

## 2024-08-20 PROCEDURE — 94660 CPAP INITIATION&MGMT: CPT

## 2024-08-20 PROCEDURE — 86900 BLOOD TYPING SEROLOGIC ABO: CPT | Performed by: EMERGENCY MEDICINE

## 2024-08-20 PROCEDURE — 93005 ELECTROCARDIOGRAM TRACING: CPT

## 2024-08-20 PROCEDURE — 85025 COMPLETE CBC W/AUTO DIFF WBC: CPT | Performed by: EMERGENCY MEDICINE

## 2024-08-20 PROCEDURE — 2500000001 HC RX 250 WO HCPCS SELF ADMINISTERED DRUGS (ALT 637 FOR MEDICARE OP): Performed by: EMERGENCY MEDICINE

## 2024-08-20 PROCEDURE — 84075 ASSAY ALKALINE PHOSPHATASE: CPT | Performed by: EMERGENCY MEDICINE

## 2024-08-20 PROCEDURE — 96374 THER/PROPH/DIAG INJ IV PUSH: CPT

## 2024-08-20 PROCEDURE — 71045 X-RAY EXAM CHEST 1 VIEW: CPT

## 2024-08-20 RX ORDER — CLONIDINE HYDROCHLORIDE 0.1 MG/1
0.2 TABLET ORAL ONCE
Status: COMPLETED | OUTPATIENT
Start: 2024-08-20 | End: 2024-08-20

## 2024-08-20 RX ORDER — NITROGLYCERIN 0.4 MG/1
0.4 TABLET SUBLINGUAL EVERY 5 MIN PRN
Status: DISCONTINUED | OUTPATIENT
Start: 2024-08-20 | End: 2024-08-21 | Stop reason: HOSPADM

## 2024-08-20 RX ORDER — HYDRALAZINE HYDROCHLORIDE 20 MG/ML
10 INJECTION INTRAMUSCULAR; INTRAVENOUS ONCE
Status: COMPLETED | OUTPATIENT
Start: 2024-08-20 | End: 2024-08-20

## 2024-08-20 RX ORDER — ACETAMINOPHEN 325 MG/1
975 TABLET ORAL ONCE
Status: COMPLETED | OUTPATIENT
Start: 2024-08-20 | End: 2024-08-20

## 2024-08-20 RX ORDER — LABETALOL HYDROCHLORIDE 5 MG/ML
20 INJECTION, SOLUTION INTRAVENOUS ONCE
Status: COMPLETED | OUTPATIENT
Start: 2024-08-20 | End: 2024-08-20

## 2024-08-20 ASSESSMENT — PAIN SCALES - GENERAL
PAINLEVEL_OUTOF10: 7
PAINLEVEL_OUTOF10: 2
PAINLEVEL_OUTOF10: 7
PAINLEVEL_OUTOF10: 0 - NO PAIN
PAINLEVEL_OUTOF10: 6

## 2024-08-20 ASSESSMENT — PAIN - FUNCTIONAL ASSESSMENT: PAIN_FUNCTIONAL_ASSESSMENT: 0-10

## 2024-08-20 ASSESSMENT — PAIN DESCRIPTION - LOCATION: LOCATION: HEAD

## 2024-08-20 NOTE — ED PROVIDER NOTES
HPI   Chief Complaint   Patient presents with    Shortness of Breath     H/O ANEMIA       Chief complaint: Shortness of breath    History of present illness: Patient is a 49-year-old male with history of end-stage renal disease dialysis dependent presenting to the emergency department with complaints of shortness of breath.  According to the patient, he was called into the emergency department because he had low hemoglobin and hematocrit.  Patient states that he has been compliant with his dialysis.  The patient states that he is not having any chest pain at this time.  The patient admits that he is short of breath.  Patient states that he feels unwell however this is usual for his anemia.  The patient denies any recent fever.  Concern, the patient presents to the emergency department for further evaluation.      History provided by:  Patient and relative   used: No            Patient History   Past Medical History:   Diagnosis Date    Angiodysplasia of stomach and duodenum without bleeding     Gastric AV malformation    COVID-19     COVID-19 virus infection    Diverticulosis of intestine, part unspecified, without perforation or abscess without bleeding     Diverticulosis    End stage renal disease (Multi) 08/02/2021    ESRD (end stage renal disease)    Other hemorrhoids     Internal hemorrhoid    Personal history of diseases of the blood and blood-forming organs and certain disorders involving the immune mechanism     History of anemia    Personal history of other diseases of the circulatory system     History of hypertension    Personal history of other diseases of the musculoskeletal system and connective tissue     History of gout    Personal history of other endocrine, nutritional and metabolic disease     History of obesity    Personal history of other medical treatment     History of blood transfusion    Residual hemorrhoidal skin tags     External hemorrhoid    Smoker     Ulcer of anus  and rectum     Rectal ulcer    Unspecified osteoarthritis, unspecified site     Osteoarthritis     Past Surgical History:   Procedure Laterality Date    CT ABDOMEN PELVIS ANGIOGRAM W AND/OR WO IV CONTRAST  5/24/2023    CT ABDOMEN PELVIS ANGIOGRAM W AND/OR WO IV CONTRAST 5/24/2023 [facility]    IR VENOGRAM DIALYSIS  5/26/2023    IR VENOGRAM DIALYSIS 5/26/2023 [facility]    OTHER SURGICAL HISTORY  01/18/2022    Dialysis tunneled catheter placement    OTHER SURGICAL HISTORY  01/19/2022    Arteriovenous fistula creation procedure     No family history on file.  Social History     Tobacco Use    Smoking status: Never    Smokeless tobacco: Never   Substance Use Topics    Alcohol use: Yes     Comment: RARELY    Drug use: Never       Physical Exam   ED Triage Vitals [08/20/24 1727]   Temp Heart Rate Respirations BP   -- 96 19 (!) 203/119      Pulse Ox Temp src Heart Rate Source Patient Position   (!) 90 % -- -- --      BP Location FiO2 (%)     -- --       Physical Exam  Vitals and nursing note reviewed.   Constitutional:       General: He is in acute distress.      Appearance: He is well-developed. He is ill-appearing.   HENT:      Head: Normocephalic and atraumatic.   Eyes:      Conjunctiva/sclera: Conjunctivae normal.   Cardiovascular:      Rate and Rhythm: Normal rate and regular rhythm.      Heart sounds: No murmur heard.  Pulmonary:      Effort: Tachypnea present.      Breath sounds: Decreased air movement present.      Comments: Patient is tripoding throughout examination.  Abdominal:      Palpations: Abdomen is soft.      Tenderness: There is no abdominal tenderness.   Musculoskeletal:         General: No swelling.      Cervical back: Neck supple.   Skin:     General: Skin is warm and dry.      Capillary Refill: Capillary refill takes less than 2 seconds.   Neurological:      Mental Status: He is alert.   Psychiatric:         Mood and Affect: Mood normal.           ED Course & MDM   ED Course as of 08/21/24 1527   Tue Aug  20, 2024 1957 BP(!): 211/135 [BK]      ED Course User Index  [BK] Michele Enriquez MD         Diagnoses as of 08/21/24 1527   Hypoxia   Hypertension, unspecified type                 No data recorded     Weed Coma Scale Score: 15 (08/20/24 1747 : Zari Malloy RN)                           Medical Decision Making  On his arrival to the emergency department, the patient was extremely dyspneic and tripoding as result I ordered BiPAP therapy for the patient however, the patient refused this.  Patient was placed on supplemental oxygen as he was hypoxic in the emergency department.  In addition on his arrival to the emergency department, the patient was extremely hypertensive and given his shortness of breath I have concerns the patient is presenting with flash pulmonary edema as result I ordered the patient aggressive blood pressure control consisting of hydralazine nitroglycerin labetalol clonidine.    CBC demonstrated hemoglobin of 7.8 and no other significant abnormalities Chem-7 demonstrated a BUN of 92 and a creatinine of greater than 25 LFTs are within normal limits patient's troponin was elevated at 88 the patient's blood type is O+.  Chest x-ray demonstrated a large right-sided pleural effusion similar to previous study finally, the patient's EKG demonstrates a normal sinus rhythm with a rate of 95 bpm isoelectric ST segments narrow QRS complexes and a QTc of 492.    Patient presents the emergency department with complaints of shortness of breath and concern for anemia.  Workup was performed as above I explained to the patient that his hemoglobin is above 7 and as result he does not require transfusion today.  The patient expressed understanding however I explained to the patient has an elevated troponin and elevated blood pressure and hypoxia and as result he will require admission to the hospital.  At this time, the patient informing that he is refusing admission to the hospital requesting to go  home at this time, the patient is alert he is oriented x 3 as result I feel the patient is decisional.  I explained to the patient that leaving AGAINST MEDICAL ADVICE could lead to the lyse of life limb tissue or could cause temporary or permanent disability.  The patient expressed understanding he was instructed to return to the emergency department immediately for any reason.  The patient then left the emergency department AMA after filling out appropriate paperwork        Procedure  Procedures     Michele Enriquez MD  08/21/24 6926

## 2024-08-20 NOTE — ED TRIAGE NOTES
TO ED FROM HOME AFTER BEING CALLED IN FOR LOW H/H. DIALYSIS PT (WENT FULL TREATMENT YESTERDAY) AND IS SOB

## 2024-08-23 LAB
ATRIAL RATE: 95 BPM
P AXIS: 38 DEGREES
P OFFSET: 150 MS
P ONSET: 96 MS
PR INTERVAL: 232 MS
Q ONSET: 212 MS
QRS COUNT: 16 BEATS
QRS DURATION: 100 MS
QT INTERVAL: 392 MS
QTC CALCULATION(BAZETT): 492 MS
QTC FREDERICIA: 456 MS
R AXIS: -42 DEGREES
T AXIS: 77 DEGREES
T OFFSET: 408 MS
VENTRICULAR RATE: 95 BPM

## 2024-08-29 NOTE — DISCHARGE SUMMARY
Discharge Diagnosis  Metabolic acidosis    Issues Requiring Follow-Up  N/A    Discharge Meds     Your medication list        START taking these medications        Instructions Last Dose Given Next Dose Due   B complex-vitamin C-folic acid 1 mg capsule  Commonly known as: Nephrocaps      Take 1 capsule by mouth once daily.       pantoprazole 40 mg EC tablet  Commonly known as: ProtoNix  Start taking on: July 27, 2024      Take 1 tablet (40 mg) by mouth once daily in the morning. Take before meals. Do not crush, chew, or split.              CONTINUE taking these medications        Instructions Last Dose Given Next Dose Due   allopurinol 100 mg tablet  Commonly known as: Zyloprim           amLODIPine 10 mg tablet  Commonly known as: Norvasc      Take 1 tablet (10 mg) by mouth once daily.       atorvastatin 80 mg tablet  Commonly known as: Lipitor           calcium acetate 667 mg capsule  Commonly known as: Phoslo      Take 3 capsules (2,000 mg) by mouth 3 times a day with meals.       carvedilol 25 mg tablet  Commonly known as: Coreg      Take 1 tablet (25 mg) by mouth every 12 hours.       cholecalciferol 50 mcg (2,000 unit) capsule  Commonly known as: Vitamin D-3           diphenoxylate-atropine 2.5-0.025 mg tablet  Commonly known as: Lomotil      Take 1 tablet by mouth once daily as needed for diarrhea.       hydrALAZINE 100 mg tablet  Commonly known as: Apresoline      Take 1 tablet (100 mg) by mouth 2 times a day.       levETIRAcetam 500 mg tablet  Commonly known as: Keppra      Take 1 tablet (500 mg) by mouth once daily. And one additional tablet after dialysis       lidocaine-prilocaine 2.5-2.5 % cream  Commonly known as: Emla      Apply thick layer 2 hours prior to dialysis site, cover with plastic wrap.       loperamide 2 mg capsule  Commonly known as: Imodium      Take 1 capsule (2 mg) by mouth once daily as needed for diarrhea.       losartan 25 mg tablet  Commonly known as: Cozaar      Take 1 tablet (25 mg)  by mouth once daily.              STOP taking these medications      colchicine 0.6 mg tablet                  Where to Get Your Medications        These medications were sent to [id number] - [place], [place] - [address]  [address], [place] [address]      Phone: [phone]   amLODIPine 10 mg tablet  B complex-vitamin C-folic acid 1 mg capsule  levETIRAcetam 500 mg tablet  losartan 25 mg tablet  pantoprazole 40 mg EC tablet         Test Results Pending At Discharge  N/A    Hospital Course    Soren Howard is a 49 y.o. male with a past medical history of ESRD on HD with multiple hospitalization 2/2 missed HD, HTN, HLD, Seizure, gout, obesity, and angiodysplasia of stomach/duodenum who presented to the ED originally with slurred speech. Enroute to the ED, EMS noted his blood glucose was 35, requiring 1 tube of oral glucose and 100 mL of D10 to correct. On exam in ED35, he endorses shortness of breath, worse with lying flat. He denies any fever, chills, chest pain, shortness of breath, melena, coffee-ground emesis, nausea, or vomiting. He reports his BLE is at baseline. He denies missing any HD sessions. He reports making a small amount of urine.    Severe anemia  - Normotensive, no tachycardia  - Suspect 2/2 ESRD, could be exaggerated ISO hypervolemia  - Given 2 unit pRBC in AM of admission  - Recently seen at [facility] and left AMA for similar  - No DVT chemoppx, SCDs  - No evidence of GI bleed   - Patient received 3 total units of PRBCs, hemoglobin now stabilized    The patient will be discharged home in stable condition to resume HD schedule. Nephrology with EPO injections scheduled.    Pertinent Physical Exam At Time of Discharge    GENERAL APPEARANCE: A&Ox3, appears in no acute distress  HEAD: normocephalic, atraumatic  THROAT: Oral cavity and pharynx normal. No inflammation, swelling, exudate, or lesions.  NECK: Neck supple, non-tender without lymphadenopathy,  masses or thyromegaly.  CARDIAC: Normal S1 and S2. No S3, S4 or murmurs. Rhythm is regular. There is no peripheral edema, cyanosis or pallor. Extremities are warm and well perfused. No carotid bruits.  LUNGS: Clear to auscultation bilaterally without rales, rhonchi, wheezing or diminished breath sounds.  ABDOMEN: Positive bowel sounds. Soft, nondistended, nontender. No guarding or rebound. No masses.  EXTREMITIES: No significant deformity or joint abnormality. No edema. Peripheral pulses intact. No varicosities.  SKIN: Skin normal color, texture and turgor with no lesions or rash  PSYCHIATRIC: oriented to person, place, and time, good judgement and reason, without hallucinations, abnormal affect or abnormal behaviors during the examination. Patient is not suicidal.        Outpatient Follow-Up  Future Appointments   Date Time Provider Department Center   10/22/2024  9:00 AM [id number]   10/22/2024  2:00 PM Kaylee Krueger LCSW [id number]   10/22/2024  3:00 PM [facility]P KIDNEY PHYSICIAN [id number]   10/22/2024  3:30 PM TXP ABDOMINAL SURGEON [id number]         Aly Hankins MD

## 2024-10-10 ENCOUNTER — APPOINTMENT (OUTPATIENT)
Dept: CARDIOLOGY | Facility: HOSPITAL | Age: 49
End: 2024-10-10
Payer: COMMERCIAL

## 2024-10-10 ENCOUNTER — HOSPITAL ENCOUNTER (EMERGENCY)
Facility: HOSPITAL | Age: 49
Discharge: LEFT BEFORE DISCHARGE | End: 2024-10-10
Attending: EMERGENCY MEDICINE
Payer: COMMERCIAL

## 2024-10-10 VITALS
HEIGHT: 67 IN | DIASTOLIC BLOOD PRESSURE: 97 MMHG | WEIGHT: 209 LBS | TEMPERATURE: 97.8 F | BODY MASS INDEX: 32.8 KG/M2 | RESPIRATION RATE: 18 BRPM | HEART RATE: 74 BPM | OXYGEN SATURATION: 96 % | SYSTOLIC BLOOD PRESSURE: 140 MMHG

## 2024-10-10 DIAGNOSIS — D64.9 ANEMIA, UNSPECIFIED TYPE: Primary | ICD-10-CM

## 2024-10-10 DIAGNOSIS — K92.2 GASTROINTESTINAL HEMORRHAGE, UNSPECIFIED GASTROINTESTINAL HEMORRHAGE TYPE: ICD-10-CM

## 2024-10-10 DIAGNOSIS — Z53.29 LEFT AGAINST MEDICAL ADVICE: ICD-10-CM

## 2024-10-10 LAB
ABO GROUP (TYPE) IN BLOOD: NORMAL
ALBUMIN SERPL BCP-MCNC: 3.2 G/DL (ref 3.4–5)
ALP SERPL-CCNC: 57 U/L (ref 33–120)
ALT SERPL W P-5'-P-CCNC: 5 U/L (ref 10–52)
ANION GAP SERPL CALC-SCNC: 20 MMOL/L (ref 10–20)
ANTIBODY SCREEN: NORMAL
APTT PPP: 32 SECONDS (ref 27–38)
AST SERPL W P-5'-P-CCNC: 8 U/L (ref 9–39)
BASOPHILS # BLD AUTO: 0.02 X10*3/UL (ref 0–0.1)
BASOPHILS NFR BLD AUTO: 0.4 %
BILIRUB SERPL-MCNC: 0.3 MG/DL (ref 0–1.2)
BLOOD EXPIRATION DATE: NORMAL
BUN SERPL-MCNC: 52 MG/DL (ref 6–23)
CALCIUM SERPL-MCNC: 7.2 MG/DL (ref 8.6–10.3)
CARDIAC TROPONIN I PNL SERPL HS: 61 NG/L (ref 0–20)
CHLORIDE SERPL-SCNC: 101 MMOL/L (ref 98–107)
CO2 SERPL-SCNC: 25 MMOL/L (ref 21–32)
CREAT SERPL-MCNC: 13.12 MG/DL (ref 0.5–1.3)
DISPENSE STATUS: NORMAL
EGFRCR SERPLBLD CKD-EPI 2021: 4 ML/MIN/1.73M*2
EOSINOPHIL # BLD AUTO: 0.14 X10*3/UL (ref 0–0.7)
EOSINOPHIL NFR BLD AUTO: 2.5 %
ERYTHROCYTE [DISTWIDTH] IN BLOOD BY AUTOMATED COUNT: 18.4 % (ref 11.5–14.5)
GLUCOSE SERPL-MCNC: 78 MG/DL (ref 74–99)
HCT VFR BLD AUTO: 14.4 % (ref 41–52)
HGB BLD-MCNC: 4.1 G/DL (ref 13.5–17.5)
IMM GRANULOCYTES # BLD AUTO: 0.03 X10*3/UL (ref 0–0.7)
IMM GRANULOCYTES NFR BLD AUTO: 0.5 % (ref 0–0.9)
INR PPP: 1.2 (ref 0.9–1.1)
LACTATE SERPL-SCNC: 1.2 MMOL/L (ref 0.4–2)
LYMPHOCYTES # BLD AUTO: 0.92 X10*3/UL (ref 1.2–4.8)
LYMPHOCYTES NFR BLD AUTO: 16.4 %
MCH RBC QN AUTO: 27 PG (ref 26–34)
MCHC RBC AUTO-ENTMCNC: 28.5 G/DL (ref 32–36)
MCV RBC AUTO: 95 FL (ref 80–100)
MONOCYTES # BLD AUTO: 0.64 X10*3/UL (ref 0.1–1)
MONOCYTES NFR BLD AUTO: 11.4 %
NEUTROPHILS # BLD AUTO: 3.86 X10*3/UL (ref 1.2–7.7)
NEUTROPHILS NFR BLD AUTO: 68.8 %
NRBC BLD-RTO: 0 /100 WBCS (ref 0–0)
PLATELET # BLD AUTO: 218 X10*3/UL (ref 150–450)
POTASSIUM SERPL-SCNC: 3.8 MMOL/L (ref 3.5–5.3)
PRODUCT BLOOD TYPE: 5100
PRODUCT CODE: NORMAL
PROT SERPL-MCNC: 6.4 G/DL (ref 6.4–8.2)
PROTHROMBIN TIME: 13.9 SECONDS (ref 9.8–12.8)
RBC # BLD AUTO: 1.52 X10*6/UL (ref 4.5–5.9)
RH FACTOR (ANTIGEN D): NORMAL
SODIUM SERPL-SCNC: 142 MMOL/L (ref 136–145)
UNIT ABO: NORMAL
UNIT NUMBER: NORMAL
UNIT RH: NORMAL
UNIT VOLUME: 290
UNIT VOLUME: 294
UNIT VOLUME: 350
WBC # BLD AUTO: 5.6 X10*3/UL (ref 4.4–11.3)
XM INTEP: NORMAL

## 2024-10-10 PROCEDURE — 99285 EMERGENCY DEPT VISIT HI MDM: CPT | Mod: 25

## 2024-10-10 PROCEDURE — 86901 BLOOD TYPING SEROLOGIC RH(D): CPT | Performed by: EMERGENCY MEDICINE

## 2024-10-10 PROCEDURE — 36415 COLL VENOUS BLD VENIPUNCTURE: CPT | Performed by: EMERGENCY MEDICINE

## 2024-10-10 PROCEDURE — 83605 ASSAY OF LACTIC ACID: CPT | Performed by: EMERGENCY MEDICINE

## 2024-10-10 PROCEDURE — 85730 THROMBOPLASTIN TIME PARTIAL: CPT | Performed by: EMERGENCY MEDICINE

## 2024-10-10 PROCEDURE — 80053 COMPREHEN METABOLIC PANEL: CPT | Performed by: EMERGENCY MEDICINE

## 2024-10-10 PROCEDURE — 86923 COMPATIBILITY TEST ELECTRIC: CPT

## 2024-10-10 PROCEDURE — 93005 ELECTROCARDIOGRAM TRACING: CPT

## 2024-10-10 PROCEDURE — P9016 RBC LEUKOCYTES REDUCED: HCPCS

## 2024-10-10 PROCEDURE — 36430 TRANSFUSION BLD/BLD COMPNT: CPT

## 2024-10-10 PROCEDURE — 85025 COMPLETE CBC W/AUTO DIFF WBC: CPT | Performed by: EMERGENCY MEDICINE

## 2024-10-10 PROCEDURE — 85610 PROTHROMBIN TIME: CPT | Performed by: EMERGENCY MEDICINE

## 2024-10-10 PROCEDURE — 84484 ASSAY OF TROPONIN QUANT: CPT | Performed by: EMERGENCY MEDICINE

## 2024-10-10 ASSESSMENT — PAIN SCALES - GENERAL: PAINLEVEL_OUTOF10: 8

## 2024-10-10 ASSESSMENT — COLUMBIA-SUICIDE SEVERITY RATING SCALE - C-SSRS
1. IN THE PAST MONTH, HAVE YOU WISHED YOU WERE DEAD OR WISHED YOU COULD GO TO SLEEP AND NOT WAKE UP?: NO
2. HAVE YOU ACTUALLY HAD ANY THOUGHTS OF KILLING YOURSELF?: NO
6. HAVE YOU EVER DONE ANYTHING, STARTED TO DO ANYTHING, OR PREPARED TO DO ANYTHING TO END YOUR LIFE?: NO

## 2024-10-10 ASSESSMENT — PAIN - FUNCTIONAL ASSESSMENT: PAIN_FUNCTIONAL_ASSESSMENT: 0-10

## 2024-10-10 ASSESSMENT — PAIN DESCRIPTION - PAIN TYPE: TYPE: ACUTE PAIN

## 2024-10-10 ASSESSMENT — PAIN DESCRIPTION - PROGRESSION: CLINICAL_PROGRESSION: NOT CHANGED

## 2024-10-10 ASSESSMENT — PAIN DESCRIPTION - ORIENTATION: ORIENTATION: LOWER;LEFT

## 2024-10-10 ASSESSMENT — PAIN DESCRIPTION - DESCRIPTORS: DESCRIPTORS: ACHING

## 2024-10-10 ASSESSMENT — PAIN DESCRIPTION - ONSET: ONSET: ONGOING

## 2024-10-10 ASSESSMENT — PAIN DESCRIPTION - LOCATION: LOCATION: BACK

## 2024-10-10 ASSESSMENT — PAIN DESCRIPTION - FREQUENCY: FREQUENCY: CONSTANT/CONTINUOUS

## 2024-10-10 NOTE — ED TRIAGE NOTES
"Patient presents to the ED stating that he was told by a Nurse that his \"Hemoglobin was low\" when at Dialysis yesterday. Patient's is complaining mild fatigue and Lower Left Back pain that started \"a couple of days\" and is non-traumatic. He additionally states that he had blood in his bowel movement on Monday.    PMHx of ESRD on HD (M,W,F), Left AV Fistula, CRF, Anemia, Hypoxia, Metabolic Acidosis, Seizures, Pleural Effusion, Acute Pulmonary Edema, Bacteremia, Diverticulosis, Gastric AV malformation, HTN, HLD, OA, Angiodysplasia of stomach/duodenum, Rectal Ulcer, and Gout.  "

## 2024-10-10 NOTE — ED PROVIDER NOTES
HPI   No chief complaint on file.      Chief complaint: Abnormal laboratory values    History of present illness: Patient is a 49-year-old male with history of end-stage renal disease dialysis dependence, GI bleed hypertension AVM of the intestines peripheral artery disease presenting to the emergency department with complaints of abnormal laboratory values.  According to the patient, he underwent dialysis recently and at that time he was informed that he has abnormal laboratory values.  According to the patient, he was informed that he was anemic.  The patient states that he feels otherwise relatively well he admits to having some weakness and shortness of breath.  He states that he has had symptoms like this in the past and is required transfusions he is admits to melena.  Concern, the patient presents to the emergency department for further evaluation.      History provided by:  Patient   used: No            Patient History   Past Medical History:   Diagnosis Date    Angiodysplasia of stomach and duodenum without bleeding     Gastric AV malformation    COVID-19     COVID-19 virus infection    Diverticulosis of intestine, part unspecified, without perforation or abscess without bleeding     Diverticulosis    End stage renal disease (Multi) 08/02/2021    ESRD (end stage renal disease)    Other hemorrhoids     Internal hemorrhoid    Personal history of diseases of the blood and blood-forming organs and certain disorders involving the immune mechanism     History of anemia    Personal history of other diseases of the circulatory system     History of hypertension    Personal history of other diseases of the musculoskeletal system and connective tissue     History of gout    Personal history of other endocrine, nutritional and metabolic disease     History of obesity    Personal history of other medical treatment     History of blood transfusion    Residual hemorrhoidal skin tags     External  hemorrhoid    Smoker     Ulcer of anus and rectum     Rectal ulcer    Unspecified osteoarthritis, unspecified site     Osteoarthritis     Past Surgical History:   Procedure Laterality Date    CT ABDOMEN PELVIS ANGIOGRAM W AND/OR WO IV CONTRAST  5/24/2023    CT ABDOMEN PELVIS ANGIOGRAM W AND/OR WO IV CONTRAST 5/24/2023 [facility]    IR VENOGRAM DIALYSIS  5/26/2023    IR VENOGRAM DIALYSIS 5/26/2023 [facility]    OTHER SURGICAL HISTORY  01/18/2022    Dialysis tunneled catheter placement    OTHER SURGICAL HISTORY  01/19/2022    Arteriovenous fistula creation procedure     No family history on file.  Social History     Tobacco Use    Smoking status: Never    Smokeless tobacco: Never   Substance Use Topics    Alcohol use: Yes     Comment: RARELY    Drug use: Never       Physical Exam   ED Triage Vitals   Temp Pulse Resp BP   -- -- -- --      SpO2 Temp src Heart Rate Source Patient Position   -- -- -- --      BP Location FiO2 (%)     -- --       Physical Exam  Vitals and nursing note reviewed.   Constitutional:       General: He is not in acute distress.     Appearance: He is well-developed.   HENT:      Head: Normocephalic and atraumatic.   Eyes:      Conjunctiva/sclera: Conjunctivae normal.   Cardiovascular:      Rate and Rhythm: Normal rate and regular rhythm.      Heart sounds: No murmur heard.  Pulmonary:      Effort: Pulmonary effort is normal. No respiratory distress.      Breath sounds: Normal breath sounds.   Abdominal:      Palpations: Abdomen is soft.      Tenderness: There is no abdominal tenderness.   Musculoskeletal:         General: No swelling.      Cervical back: Neck supple.   Skin:     General: Skin is warm and dry.      Capillary Refill: Capillary refill takes less than 2 seconds.      Coloration: Skin is pale.   Neurological:      Mental Status: He is alert.   Psychiatric:         Mood and Affect: Mood normal.           ED Course & MDM   Diagnoses as of 10/20/24 0920   Anemia, unspecified type    Gastrointestinal hemorrhage, unspecified gastrointestinal hemorrhage type   Left against medical advice                 No data recorded                                 Medical Decision Making  Medical Decision Making: Patient remained stable during his time in the emergency department.  CBC demonstrated a hemoglobin of only 4.1.  The patient's Chem-7 demonstrated changes consistent with chronic kidney disease and end-stage renal disease including a creatinine of 13 with a BUN of 52 LFTs are within normal and his troponin was 61.  Patient's INR is 1.2 blood type is O+.  The patient's EKG demonstrated normal sinus rhythm with a rate of 77 bpm isoelectric ST segments narrow QRS complexes and a QTc of 466.    Patient presents to the emergency department today with an abnormal laboratory values.  Of our workup was performed as above and demonstrates a hemoglobin of only 4.  I ordered 3 units of packed red blood cells for the patient.  I was informed by the nursing staff that after 2 units of packed red blood cells, the patient was refusing further blood transfusion and was demanded to be discharged.  I explained to the patient that leaving AGAINST MEDICAL ADVICE could lead to the loss of life limb tissue or could cause temporary or permanent disability I am especially concerned in this patient given his profound anemia as well as continued melena.  At this time, the patient is oriented x 3 and decisional.  The patient then signed appropriate paperwork and left the emergency department AGAINST MEDICAL ADVICE.    Amount and/or Complexity of Data Reviewed  Labs: ordered. Decision-making details documented in ED Course.  ECG/medicine tests: ordered. Decision-making details documented in ED Course.        Procedure  Procedures     Michele Enriquez MD  10/20/24 5758

## 2024-10-10 NOTE — ED NOTES
Community Resource Name: List of  primary care physician.  Phone Number:   Staff Member:  Tiffanie Hooker     Discussed the following topics on behalf of the patient:  []  Behavioral Health Assistance     []  Case Management  []   Assistance  []  Digital Equity Assistance  []  Dental Health Assistance  []  Education Assistance  []  Employment Assistance  []  Financial Strain Relief Assistance  []  Food Insecurity Assistance  [x]  Healthcare Coverage Assistance  []  Housing Stability Assistance  []  IP Violence Relief Assistance  []  Legal Assistance  []  Physical Activity Assistance  []  Social Connection Assistance  []  Stress Relief Assistance   []  Substance Abuse Assistance  []  Transportation Assistance  []  Utility Assistance  [x]  Other: [insert comment here]  Patient doesn't have a PCP.  Next Steps:         DAVID Mcfarlane   CHW talked to patient regarding not having a primary care physician. CHW asked the patient if he would be interested in looking at a list of  primary care physician to choose from for future services. Patient agreed and was given a list of  physicians, they are accepting new patients, they are taking his insurance([employer]), and they are in the area where he lives. Patient expressed appreciation.  Responsible Staff  DAVID Mcfarlane  Department     DAVID Mcfarlane  10/10/24 3039

## 2024-10-11 LAB
ATRIAL RATE: 77 BPM
P AXIS: 38 DEGREES
P OFFSET: 153 MS
P ONSET: 96 MS
PR INTERVAL: 240 MS
Q ONSET: 216 MS
QRS COUNT: 12 BEATS
QRS DURATION: 108 MS
QT INTERVAL: 412 MS
QTC CALCULATION(BAZETT): 466 MS
QTC FREDERICIA: 447 MS
R AXIS: 2 DEGREES
T AXIS: 193 DEGREES
T OFFSET: 422 MS
VENTRICULAR RATE: 77 BPM

## 2024-10-22 ENCOUNTER — DOCUMENTATION (OUTPATIENT)
Dept: TRANSPLANT | Facility: HOSPITAL | Age: 49
End: 2024-10-22

## 2024-10-22 ENCOUNTER — LAB REQUISITION (OUTPATIENT)
Dept: LAB | Facility: CLINIC | Age: 49
End: 2024-10-22
Payer: COMMERCIAL

## 2024-10-22 ENCOUNTER — OFFICE VISIT (OUTPATIENT)
Dept: TRANSPLANT | Facility: HOSPITAL | Age: 49
End: 2024-10-22
Payer: COMMERCIAL

## 2024-10-22 ENCOUNTER — SOCIAL WORK (OUTPATIENT)
Dept: TRANSPLANT | Facility: HOSPITAL | Age: 49
End: 2024-10-22
Payer: COMMERCIAL

## 2024-10-22 ENCOUNTER — LAB (OUTPATIENT)
Dept: LAB | Facility: LAB | Age: 49
End: 2024-10-22
Payer: COMMERCIAL

## 2024-10-22 ENCOUNTER — EDUCATION (OUTPATIENT)
Dept: TRANSPLANT | Facility: HOSPITAL | Age: 49
End: 2024-10-22
Payer: COMMERCIAL

## 2024-10-22 VITALS
HEIGHT: 67 IN | OXYGEN SATURATION: 93 % | SYSTOLIC BLOOD PRESSURE: 148 MMHG | WEIGHT: 185.3 LBS | DIASTOLIC BLOOD PRESSURE: 77 MMHG | BODY MASS INDEX: 29.08 KG/M2 | TEMPERATURE: 97.5 F | HEART RATE: 86 BPM

## 2024-10-22 VITALS
BODY MASS INDEX: 29.08 KG/M2 | OXYGEN SATURATION: 93 % | SYSTOLIC BLOOD PRESSURE: 148 MMHG | DIASTOLIC BLOOD PRESSURE: 77 MMHG | TEMPERATURE: 97.5 F | HEART RATE: 88 BPM | HEIGHT: 67 IN | WEIGHT: 185.3 LBS

## 2024-10-22 DIAGNOSIS — N18.6 END STAGE RENAL DISEASE (MULTI): ICD-10-CM

## 2024-10-22 DIAGNOSIS — N18.6 ESRD (END STAGE RENAL DISEASE) (MULTI): Primary | ICD-10-CM

## 2024-10-22 DIAGNOSIS — Z01.818 PRE-TRANSPLANT EVALUATION FOR KIDNEY TRANSPLANT: ICD-10-CM

## 2024-10-22 DIAGNOSIS — Z01.818 PRE-TRANSPLANT EVALUATION FOR KIDNEY TRANSPLANT: Primary | ICD-10-CM

## 2024-10-22 LAB
ABO GROUP (TYPE) IN BLOOD: NORMAL
ALBUMIN SERPL BCP-MCNC: 3.7 G/DL (ref 3.4–5)
ALP SERPL-CCNC: 68 U/L (ref 33–120)
ALT SERPL W P-5'-P-CCNC: 7 U/L (ref 10–52)
AMYLASE SERPL-CCNC: 61 U/L (ref 29–103)
AST SERPL W P-5'-P-CCNC: 9 U/L (ref 9–39)
BILIRUB DIRECT SERPL-MCNC: 0.1 MG/DL (ref 0–0.3)
BILIRUB SERPL-MCNC: 0.4 MG/DL (ref 0–1.2)
BUN SERPL-MCNC: 76 MG/DL (ref 6–23)
C PEPTIDE SERPL-MCNC: 5.3 NG/ML (ref 0.7–3.9)
CHOLEST SERPL-MCNC: 141 MG/DL (ref 0–199)
CHOLESTEROL/HDL RATIO: 2.3
CMV IGG AVIDITY SERPL IA-RTO: REACTIVE %
CREAT SERPL-MCNC: 15.12 MG/DL (ref 0.5–1.3)
EBV EA IGG SER QL: NEGATIVE
EBV NA AB SER QL: POSITIVE
EBV VCA IGG SER IA-ACNC: POSITIVE
EBV VCA IGM SER IA-ACNC: NEGATIVE
EGFRCR SERPLBLD CKD-EPI 2021: 4 ML/MIN/1.73M*2
ERYTHROCYTE [DISTWIDTH] IN BLOOD BY AUTOMATED COUNT: 16.8 % (ref 11.5–14.5)
EST. AVERAGE GLUCOSE BLD GHB EST-MCNC: 74 MG/DL
FLOW AUTOCROSSMATCH: NORMAL
HBA1C MFR BLD: 4.2 %
HBV CORE AB SER QL: NONREACTIVE
HBV SURFACE AB SER-ACNC: 3.2 MIU/ML
HBV SURFACE AG SERPL QL IA: NONREACTIVE
HCT VFR BLD AUTO: 26.5 % (ref 41–52)
HCV AB SER QL: NONREACTIVE
HDLC SERPL-MCNC: 61.6 MG/DL
HGB BLD-MCNC: 8 G/DL (ref 13.5–17.5)
HIV 1+2 AB+HIV1 P24 AG SERPL QL IA: NONREACTIVE
HLA CLASS I AB SCREEN,FC: NORMAL
HLA CLASS II AB SCREEN,FC: NORMAL
HLA CLS I TYP PNL BLD/T DONR HIGH RES: NORMAL
HLA RESULTS: NORMAL
HLA-DP2 QL: NORMAL
HLA-DQB1 HIGH RES: NORMAL
HLA-DRB1 HIGH RES: NORMAL
INR PPP: 1.1 (ref 0.9–1.1)
MCH RBC QN AUTO: 28.3 PG (ref 26–34)
MCHC RBC AUTO-ENTMCNC: 30.2 G/DL (ref 32–36)
MCV RBC AUTO: 94 FL (ref 80–100)
NON-HDL CHOLESTEROL: 79 MG/DL (ref 0–149)
NRBC BLD-RTO: 0 /100 WBCS (ref 0–0)
PHOSPHATE SERPL-MCNC: 6.9 MG/DL (ref 2.5–4.9)
PLATELET # BLD AUTO: 207 X10*3/UL (ref 150–450)
PROT SERPL-MCNC: 7 G/DL (ref 6.4–8.2)
PROTHROMBIN TIME: 12.8 SECONDS (ref 9.8–12.8)
RBC # BLD AUTO: 2.83 X10*6/UL (ref 4.5–5.9)
RH FACTOR (ANTIGEN D): NORMAL
TREPONEMA PALLIDUM IGG+IGM AB [PRESENCE] IN SERUM OR PLASMA BY IMMUNOASSAY: NONREACTIVE
VARICELLA ZOSTER IGG INDEX: 2 IA
VZV IGG SER QL IA: POSITIVE
WBC # BLD AUTO: 6.3 X10*3/UL (ref 4.4–11.3)

## 2024-10-22 PROCEDURE — 86825 HLA X-MATH NON-CYTOTOXIC: CPT | Mod: OUT | Performed by: SURGERY

## 2024-10-22 PROCEDURE — 82150 ASSAY OF AMYLASE: CPT

## 2024-10-22 PROCEDURE — 86780 TREPONEMA PALLIDUM: CPT

## 2024-10-22 PROCEDURE — 86832 HLA CLASS I HIGH DEFIN QUAL: CPT | Mod: OUT | Performed by: SURGERY

## 2024-10-22 PROCEDURE — 86664 EPSTEIN-BARR NUCLEAR ANTIGEN: CPT

## 2024-10-22 PROCEDURE — 80323 ALKALOIDS NOS: CPT

## 2024-10-22 PROCEDURE — 80076 HEPATIC FUNCTION PANEL: CPT

## 2024-10-22 PROCEDURE — 87389 HIV-1 AG W/HIV-1&-2 AB AG IA: CPT

## 2024-10-22 PROCEDURE — 86803 HEPATITIS C AB TEST: CPT

## 2024-10-22 PROCEDURE — 86663 EPSTEIN-BARR ANTIBODY: CPT

## 2024-10-22 PROCEDURE — 86787 VARICELLA-ZOSTER ANTIBODY: CPT

## 2024-10-22 PROCEDURE — 87340 HEPATITIS B SURFACE AG IA: CPT

## 2024-10-22 PROCEDURE — 86644 CMV ANTIBODY: CPT

## 2024-10-22 PROCEDURE — 3008F BODY MASS INDEX DOCD: CPT | Performed by: INTERNAL MEDICINE

## 2024-10-22 PROCEDURE — 86900 BLOOD TYPING SEROLOGIC ABO: CPT

## 2024-10-22 PROCEDURE — 84100 ASSAY OF PHOSPHORUS: CPT

## 2024-10-22 PROCEDURE — 86481 TB AG RESPONSE T-CELL SUSP: CPT

## 2024-10-22 PROCEDURE — 84153 ASSAY OF PSA TOTAL: CPT

## 2024-10-22 PROCEDURE — 85027 COMPLETE CBC AUTOMATED: CPT

## 2024-10-22 PROCEDURE — 85610 PROTHROMBIN TIME: CPT

## 2024-10-22 PROCEDURE — 82465 ASSAY BLD/SERUM CHOLESTEROL: CPT

## 2024-10-22 PROCEDURE — 84520 ASSAY OF UREA NITROGEN: CPT

## 2024-10-22 PROCEDURE — 36415 COLL VENOUS BLD VENIPUNCTURE: CPT

## 2024-10-22 PROCEDURE — 82565 ASSAY OF CREATININE: CPT

## 2024-10-22 PROCEDURE — 86901 BLOOD TYPING SEROLOGIC RH(D): CPT

## 2024-10-22 PROCEDURE — 84681 ASSAY OF C-PEPTIDE: CPT

## 2024-10-22 PROCEDURE — 3077F SYST BP >= 140 MM HG: CPT | Performed by: INTERNAL MEDICINE

## 2024-10-22 PROCEDURE — 83718 ASSAY OF LIPOPROTEIN: CPT

## 2024-10-22 PROCEDURE — 99215 OFFICE O/P EST HI 40 MIN: CPT | Performed by: INTERNAL MEDICINE

## 2024-10-22 PROCEDURE — 99215 OFFICE O/P EST HI 40 MIN: CPT | Mod: AF | Performed by: INTERNAL MEDICINE

## 2024-10-22 PROCEDURE — 86706 HEP B SURFACE ANTIBODY: CPT

## 2024-10-22 PROCEDURE — 80307 DRUG TEST PRSMV CHEM ANLYZR: CPT

## 2024-10-22 PROCEDURE — 86704 HEP B CORE ANTIBODY TOTAL: CPT

## 2024-10-22 PROCEDURE — 84154 ASSAY OF PSA FREE: CPT

## 2024-10-22 PROCEDURE — 83036 HEMOGLOBIN GLYCOSYLATED A1C: CPT

## 2024-10-22 PROCEDURE — 86665 EPSTEIN-BARR CAPSID VCA: CPT

## 2024-10-22 PROCEDURE — 3078F DIAST BP <80 MM HG: CPT | Performed by: INTERNAL MEDICINE

## 2024-10-22 SDOH — ECONOMIC STABILITY: FOOD INSECURITY: WITHIN THE PAST 12 MONTHS, YOU WORRIED THAT YOUR FOOD WOULD RUN OUT BEFORE YOU GOT MONEY TO BUY MORE.: NEVER TRUE

## 2024-10-22 SDOH — ECONOMIC STABILITY: FOOD INSECURITY: WITHIN THE PAST 12 MONTHS, THE FOOD YOU BOUGHT JUST DIDN'T LAST AND YOU DIDN'T HAVE MONEY TO GET MORE.: NEVER TRUE

## 2024-10-22 SDOH — ECONOMIC STABILITY: TRANSPORTATION INSECURITY
IN THE PAST 12 MONTHS, HAS THE LACK OF TRANSPORTATION KEPT YOU FROM MEDICAL APPOINTMENTS OR FROM GETTING MEDICATIONS?: NO

## 2024-10-22 SDOH — ECONOMIC STABILITY: INCOME INSECURITY: IN THE LAST 12 MONTHS, WAS THERE A TIME WHEN YOU WERE NOT ABLE TO PAY THE MORTGAGE OR RENT ON TIME?: NO

## 2024-10-22 SDOH — ECONOMIC STABILITY: TRANSPORTATION INSECURITY
IN THE PAST 12 MONTHS, HAS LACK OF TRANSPORTATION KEPT YOU FROM MEETINGS, WORK, OR FROM GETTING THINGS NEEDED FOR DAILY LIVING?: NO

## 2024-10-22 ASSESSMENT — PATIENT HEALTH QUESTIONNAIRE - PHQ9
1. LITTLE INTEREST OR PLEASURE IN DOING THINGS: MORE THAN HALF THE DAYS
9. THOUGHTS THAT YOU WOULD BE BETTER OFF DEAD, OR OF HURTING YOURSELF: NOT AT ALL
3. TROUBLE FALLING OR STAYING ASLEEP: NEARLY EVERY DAY
10. IF YOU CHECKED OFF ANY PROBLEMS, HOW DIFFICULT HAVE THESE PROBLEMS MADE IT FOR YOU TO DO YOUR WORK, TAKE CARE OF THINGS AT HOME, OR GET ALONG WITH OTHER PEOPLE: SOMEWHAT DIFFICULT
4. FEELING TIRED OR HAVING LITTLE ENERGY: MORE THAN HALF THE DAYS
2. FEELING DOWN, DEPRESSED OR HOPELESS: SEVERAL DAYS
5. POOR APPETITE OR OVEREATING: SEVERAL DAYS
7. TROUBLE CONCENTRATING ON THINGS, SUCH AS READING THE NEWSPAPER OR WATCHING TELEVISION: NOT AT ALL
SUM OF ALL RESPONSES TO PHQ9 QUESTIONS 1 & 2: 3
SUM OF ALL RESPONSES TO PHQ QUESTIONS 1-9: 10
6. FEELING BAD ABOUT YOURSELF - OR THAT YOU ARE A FAILURE OR HAVE LET YOURSELF OR YOUR FAMILY DOWN: SEVERAL DAYS
8. MOVING OR SPEAKING SO SLOWLY THAT OTHER PEOPLE COULD HAVE NOTICED. OR THE OPPOSITE, BEING SO FIGETY OR RESTLESS THAT YOU HAVE BEEN MOVING AROUND A LOT MORE THAN USUAL: NOT AT ALL

## 2024-10-22 ASSESSMENT — PAIN SCALES - GENERAL
PAINLEVEL_OUTOF10: 0-NO PAIN
PAINLEVEL_OUTOF10: 0-NO PAIN

## 2024-10-22 ASSESSMENT — LIFESTYLE VARIABLES
AUDIT-C TOTAL SCORE: 4
HOW OFTEN DO YOU HAVE A DRINK CONTAINING ALCOHOL: 2-3 TIMES A WEEK
HOW MANY STANDARD DRINKS CONTAINING ALCOHOL DO YOU HAVE ON A TYPICAL DAY: 3 OR 4
HOW OFTEN DO YOU HAVE SIX OR MORE DRINKS ON ONE OCCASION: NEVER
SKIP TO QUESTIONS 9-10: 0

## 2024-10-22 ASSESSMENT — SOCIAL DETERMINANTS OF HEALTH (SDOH)
WITHIN THE LAST YEAR, HAVE YOU BEEN HUMILIATED OR EMOTIONALLY ABUSED IN OTHER WAYS BY YOUR PARTNER OR EX-PARTNER?: NO
IN THE PAST 12 MONTHS, HAS THE ELECTRIC, GAS, OIL, OR WATER COMPANY THREATENED TO SHUT OFF SERVICE IN YOUR HOME?: NO
WITHIN THE LAST YEAR, HAVE YOU BEEN KICKED, HIT, SLAPPED, OR OTHERWISE PHYSICALLY HURT BY YOUR PARTNER OR EX-PARTNER?: NO
WITHIN THE LAST YEAR, HAVE YOU BEEN AFRAID OF YOUR PARTNER OR EX-PARTNER?: NO
WITHIN THE LAST YEAR, HAVE TO BEEN RAPED OR FORCED TO HAVE ANY KIND OF SEXUAL ACTIVITY BY YOUR PARTNER OR EX-PARTNER?: NO
HOW HARD IS IT FOR YOU TO PAY FOR THE VERY BASICS LIKE FOOD, HOUSING, MEDICAL CARE, AND HEATING?: SOMEWHAT HARD

## 2024-10-22 NOTE — PROGRESS NOTES
Patient attended class on 10/22/2024.  Accompanied to class with female support.  Oral and written education was provided.  Patient remained attentive throughout the review session, asking appropriate questions.  Evaluation consents were signed.     HLA sensitizing questionnaire completed by patient and emailed to [facility].    PRE-TRANSPLANT EDUCATION  Patient received education regarding the following topics as part of their pre-transplant evaluation:  The evaluation process, including:   Transplant team members and roles    Required consultations and testing   Selection criteria and suitability for transplant   Listing process and receiving an organ offer   Psychosocial and financial considerations for a successful transplant   Patient responsibilities, including the necessity of adhering to a strict medical regimen  An overview of the surgical procedure   Potential medical, surgical, and psychosocial risks to transplantation, including:   Wound infection   Pneumonia   Blood clot formation   Organ rejection, failure, and possibility of re-transplantation   Lifetime immunosuppression therapy and associated risks   Arrhythmias and cardiovascular collapse   Multi-organ system failure   Death   Depression   Post-Traumatic Stress Disorder   Generalized anxiety, issues of dependence, and feelings of guilt  Available alternatives to transplantation  Donor risk factors that could affect the success of the transplant and the health of the patient, including:   Donor age   Donor medical and social history   Condition of the organ   Risk of wes cancer, HIV, Hepatitis B, Hepatitis C, or malaria if the infection is not detectable at the time of donation  Patient?s right to withdraw consent for transplantation at any time during the process  Transplants not performed in a Medicare-approved transplant center could affect the patient?s ability to have immunosuppression medication paid for under Medicare part B.    Multiple listing options.    Patient was given the opportunity to have questions answered. Patient was provided a copy of the informed consent for transplant evaluation.    Signed evaluation informed consent received? 10/22/2024

## 2024-10-22 NOTE — PROGRESS NOTES
Kidney Patient Summary    Date of appointment: 10/22/2024    Name: Soren Howard    : 1975    MRN: [id number]    Diagnosis: Hypertensive Nephrosclerosis    ABO: O     Phase: Referral  Status: Active    Center Waitlist Date:       Last Seen: New Eval  Referring Nephrologist:       HD Unit: [facility].   Dialysis Start: 2 years  Access:       Days:  MWF    PCP: No Assigned PCP Generic Provider, MD       Endocrinologist:     BMI Readings from Last 1 Encounters:   10/10/24 32.73 kg/m²     Blood Transfusion:    Medical History/Hospitalizations:   HTN, AVM of the intestines, GIB, seizure in ?  Past Medical History:   Diagnosis Date    Angiodysplasia of stomach and duodenum without bleeding     Gastric AV malformation    COVID-19     COVID-19 virus infection    Diverticulosis of intestine, part unspecified, without perforation or abscess without bleeding     Diverticulosis    End stage renal disease (Multi) 2021    ESRD (end stage renal disease)    Other hemorrhoids     Internal hemorrhoid    Personal history of diseases of the blood and blood-forming organs and certain disorders involving the immune mechanism     History of anemia    Personal history of other diseases of the circulatory system     History of hypertension    Personal history of other diseases of the musculoskeletal system and connective tissue     History of gout    Personal history of other endocrine, nutritional and metabolic disease     History of obesity    Personal history of other medical treatment     History of blood transfusion    Residual hemorrhoidal skin tags     External hemorrhoid    Smoker     Ulcer of anus and rectum     Rectal ulcer    Unspecified osteoarthritis, unspecified site     Osteoarthritis       Surgical History:   Past Surgical History:   Procedure Laterality Date    CT ABDOMEN PELVIS ANGIOGRAM W AND/OR WO IV CONTRAST  2023    CT ABDOMEN PELVIS ANGIOGRAM W AND/OR WO IV  CONTRAST 5/24/2023 [facility] CT    IR VENOGRAM DIALYSIS  5/26/2023    IR VENOGRAM DIALYSIS 5/26/2023 [facility]IO    OTHER SURGICAL HISTORY  01/18/2022    Dialysis tunneled catheter placement    OTHER SURGICAL HISTORY  01/19/2022    Arteriovenous fistula creation procedure     Donors: No    Staff:  Nephrologist: Ben   Surgeon: cancelled    : Evans     Testing Date:     Serologies:    CMV:     No results found for requested labs within last 365 days.  EBV Panel:  LUDA-MERRILL VCA IGG:   No results found for requested labs within last 365 days.   LUDA-MERRILL VCA IGM: No results found for requested labs within last 365 days.   EBV EARLY ANTIGEN ANTIBODY, IGG: No results found for requested labs within last 365 days.   EPSTIEN-BARR NUCLEAR ANTIGEN AB: No results found for requested labs within last 365 days.   Tox:    AMPHETAMINE SCREEN BLOOD: No results found for requested labs within last 365 days.   METHAMPHETAMINE, BLOOD, SCREEN: No results found for requested labs within last 365 days.   BENZODIAZEPINES SCREEN BLOOD: No results found for requested labs within last 365 days.   BUPRENORPHINE SCREEN BLOOD: No results found for requested labs within last 365 days.   CANNABINOIDS SCREEN BLOOD: No results found for requested labs within last 365 days.   COCAINE SCREEN BLOOD: No results found for requested labs within last 365 days.   METHADONE SCREEN BLOOD: No results found for requested labs within last 365 days.   OXYCODONE SCREEN BLOOD: No results found for requested labs within last 365 days.   PHENCYCLIDINE SCREEN BLOOD: No results found for requested labs within last 365 days.   OPIATE SCREEN BLOOD: No results found for requested labs within last 365 days.   DRUG SCREEN COMMENT BLOOD: No results found for requested labs within last 365 days.   BARBITURATE SCREEN BLOOD: No results found for requested labs within last 365 days.   Cotinine: No results found for requested labs within last 365  days.  HBV ag:   Nonreactive  HBV ab:   <3.1  HBV c:     No results found for requested labs within last 365 days.  HCV:        No results found for requested labs within last 365 days.  HIV:    No results found for requested labs within last 365 days.  RPR:    No results found for requested labs within last 365 days.  TSpot:   No results found for requested labs within last 365 days.   VZV:   VARICELLA ZOSTER IGG: No results found for requested labs within last 365 days.   VARICELLA ZOSTER IGG INDEX: No results found for requested labs within last 365 days.   A1C:       HEMOGLOBIN A1C/HEMOGLOBIN TOTAL IN BLOOD: No results found for requested labs within last 365 days.   ESTIMATED AVERAGE GLUCOSE FOR HBA1C: No results found for requested labs within last 365 days.   CPep:  No results found for requested labs within last 365 days.  PSA:    No results found for requested labs within last 365 days.  Other:     Test/Consult Impression Next Scheduled Date   CXR XR chest 2 views    Result Date: 7/24/2024  Large right pleural effusion with suspected atelectasis in the right lung.  Superimposed pneumonia in the right mid to lower lung zone is not excluded. Mild pulmonary edema. Signed by Mkihail Regalado         EKG ECG 12 lead 10/10/2024    Narrative  Sinus rhythm with 1st degree AV block  T wave abnormality, consider inferolateral ischemia  Prolonged QT  Abnormal ECG  When compared with ECG of 20-AUG-2024 17:35,  QRS axis Shifted right  T wave inversion now evident in Inferior leads  T wave inversion more evident in Anterolateral leads  See ED provider note for full interpretation and clinical correlation  Confirmed by Morena Strong ([id number]) on 10/11/2024 12:39:28 PM      Echo Stress Echo 3/29/2024      CT Cardiac Score No results found.     NM Stress Test No results found.     Cardiac MRI No results found.     Left Heart Catheterization No results found.     Cardiology last visit impression      Pulmonary Function Test  No results found for this or any previous visit.    CT Abd/Pelvis CT abdomen pelvis wo IV contrast    Result Date: 5/12/2023  1.  Marked diverticulosis of the entire colon, with several of the diverticula in the descending colon demonstrating very subtle surrounding fat stranding, possibly representing changes of early or resolving diverticulitis. No fluid collections free air or other complications are identified. 2. Bladder is decompressed and demonstrates some wall thickening, possibly due to nondistended state although correlation with clinical symptoms of cystitis is recommended. Additional 3. Additional incidental findings as detailed above.         Colonoscopy  Impression:      05/2023        - The examined portion of the ileum was normal.  - Diverticulosis in the sigmoid colon, in the    descending colon, in the transverse colon and in the ascending colon. There was evidence of recent bleeding from a proximal ascending colon diverticular opening.  - A single recently bleeding colonic angioectasia.   - The examined portion of the ileum was normal.  - The examination was otherwise normal on direct and retroflexion views.  - No specimens collected.    Pap N/A    Mammogram N/A    Other:

## 2024-10-22 NOTE — PROGRESS NOTES
Patient was seen 10/22 for new evaluation appointment with Dr. Carr.  Reported to Dr. Carr that he has chronic diarrhea and ongoing weight loss.  Dr. Carr and Dr. Jolly discussed stopping evaluation at this time so patient can be established with a GI provider for further work up.  Once established and cleared by GI provider, patient can return for evaluation.  Provided patient with my contact information for any questions, and he is aware we will reach out to schedule appointment.  Patient also aware he will receive a letter regarding evaluation being closed at this time.

## 2024-10-23 ENCOUNTER — TELEPHONE (OUTPATIENT)
Dept: TRANSPLANT | Facility: HOSPITAL | Age: 49
End: 2024-10-23
Payer: COMMERCIAL

## 2024-10-23 DIAGNOSIS — Z01.818 PRE-TRANSPLANT EVALUATION FOR KIDNEY TRANSPLANT: Primary | ICD-10-CM

## 2024-10-23 LAB
FLOW AUTOCROSSMATCH: NORMAL
HLA RESULTS: NORMAL

## 2024-10-24 ENCOUNTER — COMMITTEE REVIEW (OUTPATIENT)
Dept: TRANSPLANT | Facility: HOSPITAL | Age: 49
End: 2024-10-24
Payer: COMMERCIAL

## 2024-10-24 LAB
AMPHETAMINES SERPL QL SCN: NEGATIVE NG/ML
ANNOTATION COMMENT IMP: NORMAL
BARBITURATES SERPL QL SCN: NEGATIVE NG/ML
BENZODIAZ SERPL QL SCN: NEGATIVE NG/ML
BUPRENORPHINE SERPL-MCNC: NEGATIVE NG/ML
CANNABINOIDS SERPL QL SCN: NEGATIVE NG/ML
COCAINE SERPL QL SCN: NEGATIVE NG/ML
METHADONE SERPL QL SCN: NEGATIVE NG/ML
METHAMPHET SERPL QL: NEGATIVE NG/ML
NIL(NEG) CONTROL SPOT COUNT: NORMAL
OPIATES SERPL QL SCN: NEGATIVE NG/ML
OXYCODONE SERPL QL: NEGATIVE NG/ML
PANEL A SPOT COUNT: 3
PANEL B SPOT COUNT: 1
PCP SERPL QL SCN: NEGATIVE NG/ML
POS CONTROL SPOT COUNT: NORMAL
PSA FREE MFR SERPL: 27 %
PSA FREE SERPL-MCNC: 0.3 NG/ML
PSA SERPL IA-MCNC: 1.1 NG/ML (ref 0–4)
T-SPOT. TB INTERPRETATION: NEGATIVE

## 2024-10-25 LAB
COTININE SERPL-MCNC: <5 NG/ML
HLA RESULTS: NORMAL
HLA-A+B+C AB NFR SER: NORMAL %
HLA-DP+DQ+DR AB NFR SER: NORMAL %
NICOTINE SERPL-MCNC: <5 NG/ML

## 2024-10-29 ENCOUNTER — DOCUMENTATION (OUTPATIENT)
Dept: TRANSPLANT | Facility: HOSPITAL | Age: 49
End: 2024-10-29
Payer: COMMERCIAL

## 2024-10-31 ASSESSMENT — PATIENT HEALTH QUESTIONNAIRE - PHQ9
6. FEELING BAD ABOUT YOURSELF - OR THAT YOU ARE A FAILURE OR HAVE LET YOURSELF OR YOUR FAMILY DOWN: SEVERAL DAYS
8. MOVING OR SPEAKING SO SLOWLY THAT OTHER PEOPLE COULD HAVE NOTICED. OR THE OPPOSITE, BEING SO FIGETY OR RESTLESS THAT YOU HAVE BEEN MOVING AROUND A LOT MORE THAN USUAL: NOT AT ALL
4. FEELING TIRED OR HAVING LITTLE ENERGY: MORE THAN HALF THE DAYS
1. LITTLE INTEREST OR PLEASURE IN DOING THINGS: MORE THAN HALF THE DAYS
SUM OF ALL RESPONSES TO PHQ QUESTIONS 1-9: 10
5. POOR APPETITE OR OVEREATING: SEVERAL DAYS
10. IF YOU CHECKED OFF ANY PROBLEMS, HOW DIFFICULT HAVE THESE PROBLEMS MADE IT FOR YOU TO DO YOUR WORK, TAKE CARE OF THINGS AT HOME, OR GET ALONG WITH OTHER PEOPLE: SOMEWHAT DIFFICULT
2. FEELING DOWN, DEPRESSED OR HOPELESS: SEVERAL DAYS
SUM OF ALL RESPONSES TO PHQ9 QUESTIONS 1 & 2: 3
3. TROUBLE FALLING OR STAYING ASLEEP OR SLEEPING TOO MUCH: NEARLY EVERY DAY
7. TROUBLE CONCENTRATING ON THINGS, SUCH AS READING THE NEWSPAPER OR WATCHING TELEVISION: NOT AT ALL
9. THOUGHTS THAT YOU WOULD BE BETTER OFF DEAD, OR OF HURTING YOURSELF: NOT AT ALL

## 2024-10-31 ASSESSMENT — ANXIETY QUESTIONNAIRES
5. BEING SO RESTLESS THAT IT IS HARD TO SIT STILL: NOT AT ALL
4. TROUBLE RELAXING: NOT AT ALL
3. WORRYING TOO MUCH ABOUT DIFFERENT THINGS: NOT AT ALL
GAD7 TOTAL SCORE: 0
7. FEELING AFRAID AS IF SOMETHING AWFUL MIGHT HAPPEN: NOT AT ALL
1. FEELING NERVOUS, ANXIOUS, OR ON EDGE: NOT AT ALL
6. BECOMING EASILY ANNOYED OR IRRITABLE: NOT AT ALL
2. NOT BEING ABLE TO STOP OR CONTROL WORRYING: NOT AT ALL

## 2024-11-26 ENCOUNTER — APPOINTMENT (OUTPATIENT)
Dept: RADIOLOGY | Facility: HOSPITAL | Age: 49
End: 2024-11-26
Payer: COMMERCIAL

## 2024-11-26 ENCOUNTER — APPOINTMENT (OUTPATIENT)
Dept: CARDIOLOGY | Facility: HOSPITAL | Age: 49
End: 2024-11-26
Payer: COMMERCIAL

## 2024-11-26 ENCOUNTER — HOSPITAL ENCOUNTER (EMERGENCY)
Facility: HOSPITAL | Age: 49
Discharge: HOME | End: 2024-11-26
Attending: EMERGENCY MEDICINE
Payer: COMMERCIAL

## 2024-11-26 VITALS
SYSTOLIC BLOOD PRESSURE: 158 MMHG | TEMPERATURE: 97.3 F | RESPIRATION RATE: 18 BRPM | OXYGEN SATURATION: 100 % | WEIGHT: 188 LBS | DIASTOLIC BLOOD PRESSURE: 107 MMHG | BODY MASS INDEX: 29.51 KG/M2 | HEART RATE: 95 BPM | HEIGHT: 67 IN

## 2024-11-26 DIAGNOSIS — D64.9 ANEMIA, UNSPECIFIED TYPE: Primary | ICD-10-CM

## 2024-11-26 LAB
ABO GROUP (TYPE) IN BLOOD: NORMAL
ALBUMIN SERPL BCP-MCNC: 3.3 G/DL (ref 3.4–5)
ALP SERPL-CCNC: 58 U/L (ref 33–120)
ALT SERPL W P-5'-P-CCNC: 7 U/L (ref 10–52)
ANION GAP SERPL CALC-SCNC: 14 MMOL/L (ref 10–20)
ANTIBODY SCREEN: NORMAL
AST SERPL W P-5'-P-CCNC: 8 U/L (ref 9–39)
BASOPHILS # BLD AUTO: 0.02 X10*3/UL (ref 0–0.1)
BASOPHILS NFR BLD AUTO: 0.2 %
BILIRUB SERPL-MCNC: 0.3 MG/DL (ref 0–1.2)
BLOOD EXPIRATION DATE: NORMAL
BLOOD EXPIRATION DATE: NORMAL
BUN SERPL-MCNC: 26 MG/DL (ref 6–23)
CALCIUM SERPL-MCNC: 7.6 MG/DL (ref 8.6–10.3)
CARDIAC TROPONIN I PNL SERPL HS: 53 NG/L (ref 0–20)
CHLORIDE SERPL-SCNC: 100 MMOL/L (ref 98–107)
CO2 SERPL-SCNC: 32 MMOL/L (ref 21–32)
CREAT SERPL-MCNC: 8.38 MG/DL (ref 0.5–1.3)
DISPENSE STATUS: NORMAL
DISPENSE STATUS: NORMAL
EGFRCR SERPLBLD CKD-EPI 2021: 7 ML/MIN/1.73M*2
EOSINOPHIL # BLD AUTO: 0.23 X10*3/UL (ref 0–0.7)
EOSINOPHIL NFR BLD AUTO: 2.4 %
ERYTHROCYTE [DISTWIDTH] IN BLOOD BY AUTOMATED COUNT: 19 % (ref 11.5–14.5)
ERYTHROCYTE [DISTWIDTH] IN BLOOD BY AUTOMATED COUNT: 21.2 % (ref 11.5–14.5)
GLUCOSE SERPL-MCNC: 93 MG/DL (ref 74–99)
HCT VFR BLD AUTO: 16.5 % (ref 41–52)
HCT VFR BLD AUTO: 21.3 % (ref 41–52)
HGB BLD-MCNC: 4.6 G/DL (ref 13.5–17.5)
HGB BLD-MCNC: 6.2 G/DL (ref 13.5–17.5)
IMM GRANULOCYTES # BLD AUTO: 0.08 X10*3/UL (ref 0–0.7)
IMM GRANULOCYTES NFR BLD AUTO: 0.8 % (ref 0–0.9)
LYMPHOCYTES # BLD AUTO: 0.99 X10*3/UL (ref 1.2–4.8)
LYMPHOCYTES NFR BLD AUTO: 10.5 %
MAGNESIUM SERPL-MCNC: 1.77 MG/DL (ref 1.6–2.4)
MCH RBC QN AUTO: 28.3 PG (ref 26–34)
MCH RBC QN AUTO: 28.6 PG (ref 26–34)
MCHC RBC AUTO-ENTMCNC: 27.9 G/DL (ref 32–36)
MCHC RBC AUTO-ENTMCNC: 29.1 G/DL (ref 32–36)
MCV RBC AUTO: 103 FL (ref 80–100)
MCV RBC AUTO: 97 FL (ref 80–100)
MONOCYTES # BLD AUTO: 0.96 X10*3/UL (ref 0.1–1)
MONOCYTES NFR BLD AUTO: 10.2 %
NEUTROPHILS # BLD AUTO: 7.14 X10*3/UL (ref 1.2–7.7)
NEUTROPHILS NFR BLD AUTO: 75.9 %
NRBC BLD-RTO: 1 /100 WBCS (ref 0–0)
NRBC BLD-RTO: 1.2 /100 WBCS (ref 0–0)
OVALOCYTES BLD QL SMEAR: NORMAL
PLATELET # BLD AUTO: 234 X10*3/UL (ref 150–450)
PLATELET # BLD AUTO: 287 X10*3/UL (ref 150–450)
POLYCHROMASIA BLD QL SMEAR: NORMAL
POTASSIUM SERPL-SCNC: 3.5 MMOL/L (ref 3.5–5.3)
PRODUCT BLOOD TYPE: 5100
PRODUCT BLOOD TYPE: 5100
PRODUCT CODE: NORMAL
PRODUCT CODE: NORMAL
PROT SERPL-MCNC: 6.7 G/DL (ref 6.4–8.2)
RBC # BLD AUTO: 1.61 X10*6/UL (ref 4.5–5.9)
RBC # BLD AUTO: 2.19 X10*6/UL (ref 4.5–5.9)
RBC MORPH BLD: NORMAL
RH FACTOR (ANTIGEN D): NORMAL
SODIUM SERPL-SCNC: 142 MMOL/L (ref 136–145)
T4 FREE SERPL-MCNC: 1.15 NG/DL (ref 0.61–1.12)
TSH SERPL-ACNC: 5.33 MIU/L (ref 0.44–3.98)
UNIT ABO: NORMAL
UNIT ABO: NORMAL
UNIT NUMBER: NORMAL
UNIT NUMBER: NORMAL
UNIT RH: NORMAL
UNIT RH: NORMAL
UNIT VOLUME: 350
UNIT VOLUME: 350
WBC # BLD AUTO: 7.7 X10*3/UL (ref 4.4–11.3)
WBC # BLD AUTO: 9.4 X10*3/UL (ref 4.4–11.3)
XM INTEP: NORMAL
XM INTEP: NORMAL

## 2024-11-26 PROCEDURE — 71046 X-RAY EXAM CHEST 2 VIEWS: CPT

## 2024-11-26 PROCEDURE — 86923 COMPATIBILITY TEST ELECTRIC: CPT

## 2024-11-26 PROCEDURE — 99285 EMERGENCY DEPT VISIT HI MDM: CPT | Mod: 25

## 2024-11-26 PROCEDURE — 36430 TRANSFUSION BLD/BLD COMPNT: CPT

## 2024-11-26 PROCEDURE — P9016 RBC LEUKOCYTES REDUCED: HCPCS

## 2024-11-26 PROCEDURE — 85025 COMPLETE CBC W/AUTO DIFF WBC: CPT | Performed by: NURSE PRACTITIONER

## 2024-11-26 PROCEDURE — 36415 COLL VENOUS BLD VENIPUNCTURE: CPT | Performed by: NURSE PRACTITIONER

## 2024-11-26 PROCEDURE — 86850 RBC ANTIBODY SCREEN: CPT | Performed by: NURSE PRACTITIONER

## 2024-11-26 PROCEDURE — 85027 COMPLETE CBC AUTOMATED: CPT | Mod: 59 | Performed by: EMERGENCY MEDICINE

## 2024-11-26 PROCEDURE — 83735 ASSAY OF MAGNESIUM: CPT | Performed by: NURSE PRACTITIONER

## 2024-11-26 PROCEDURE — 84443 ASSAY THYROID STIM HORMONE: CPT | Performed by: NURSE PRACTITIONER

## 2024-11-26 PROCEDURE — 93005 ELECTROCARDIOGRAM TRACING: CPT

## 2024-11-26 PROCEDURE — 84439 ASSAY OF FREE THYROXINE: CPT | Performed by: NURSE PRACTITIONER

## 2024-11-26 PROCEDURE — 36415 COLL VENOUS BLD VENIPUNCTURE: CPT | Performed by: EMERGENCY MEDICINE

## 2024-11-26 PROCEDURE — 71046 X-RAY EXAM CHEST 2 VIEWS: CPT | Mod: FOREIGN READ | Performed by: RADIOLOGY

## 2024-11-26 PROCEDURE — 80053 COMPREHEN METABOLIC PANEL: CPT | Performed by: NURSE PRACTITIONER

## 2024-11-26 PROCEDURE — 84484 ASSAY OF TROPONIN QUANT: CPT | Performed by: NURSE PRACTITIONER

## 2024-11-26 RX ORDER — METOCLOPRAMIDE HYDROCHLORIDE 5 MG/ML
10 INJECTION INTRAMUSCULAR; INTRAVENOUS ONCE
Status: DISCONTINUED | OUTPATIENT
Start: 2024-11-26 | End: 2024-11-27 | Stop reason: HOSPADM

## 2024-11-26 ASSESSMENT — COLUMBIA-SUICIDE SEVERITY RATING SCALE - C-SSRS
2. HAVE YOU ACTUALLY HAD ANY THOUGHTS OF KILLING YOURSELF?: NO
1. IN THE PAST MONTH, HAVE YOU WISHED YOU WERE DEAD OR WISHED YOU COULD GO TO SLEEP AND NOT WAKE UP?: NO
6. HAVE YOU EVER DONE ANYTHING, STARTED TO DO ANYTHING, OR PREPARED TO DO ANYTHING TO END YOUR LIFE?: NO

## 2024-11-26 ASSESSMENT — PAIN SCALES - GENERAL
PAINLEVEL_OUTOF10: 0 - NO PAIN

## 2024-11-26 ASSESSMENT — PAIN - FUNCTIONAL ASSESSMENT: PAIN_FUNCTIONAL_ASSESSMENT: 0-10

## 2024-11-26 NOTE — ED TRIAGE NOTES
Secondary to patient volumes and overcrowding, I performed a brief medical screening exam of the patient in triage, as the patient awaits space in the main ED.    History of Present Illness:  Soren Howard presents with   Chief Complaint   Patient presents with    low hemoglobin    blood transfusion       Physical Exam:  General - In no acute distress  Respiratory - Breathing comfortably  Cardiac -rapid irregular heart rate  S1, S2, no m/g/r  Neurologic - Moving all extremities  Abdomen -bowel sounds present, no CVAT, nontender    Medical Decision Making:  Patient will require further evaluation in the main ED.    Initial diagnostic tests were ordered from triage.      The patient demonstrates understanding that this initial evaluation is a brief medical screening exam and the expectation is that they await for space in the main ED to be further evaluated.  The patient understands that, if they leave prior to further evaluation in the main ED after this initial evaluation in triage, they are doing so under their own accord knowing that their evaluation/work-up is not yet complete. The patient also understands that any preliminary diagnostic results, including abnormalities, may not be shared with them, if they choose to leave prior to further evaluation in the main ED.

## 2024-11-26 NOTE — ED TRIAGE NOTES
Pt. To ED from dialysis for low hemoglobin. Pt. States his hemoglobin is 3.6. Pt. Endorses SOB. Pt. Received full session of dialysis today.

## 2024-11-27 LAB
Q ONSET: 214 MS
QRS COUNT: 19 BEATS
QRS DURATION: 114 MS
QT INTERVAL: 292 MS
QTC CALCULATION(BAZETT): 407 MS
QTC FREDERICIA: 365 MS
R AXIS: -5 DEGREES
T AXIS: 170 DEGREES
T OFFSET: 360 MS
VENTRICULAR RATE: 117 BPM

## 2024-11-27 NOTE — DISCHARGE INSTRUCTIONS
Please follow-up with your dialysis center to continue dialysis.  Please return to the ED for further need for blood transfusion.

## 2024-11-27 NOTE — ED PROVIDER NOTES
HPI   Chief Complaint   Patient presents with    low hemoglobin    blood transfusion       This a 49-year-old man with past medical history of ESRD Monday Wednesday Friday prior GI bleed, AVM, peripheral arterial disease and is present with anemia from dialysis.  Did have dialysis today per his know to be anemic and sent to the ED for blood transfusion.  Denies any fever chills or denies any chest pain.  Denies any nausea or vomiting.  No black stools or prior blood per rectum.  Otherwise without complaints.            Patient History   Past Medical History:   Diagnosis Date    Angiodysplasia of stomach and duodenum without bleeding     Gastric AV malformation    COVID-19     COVID-19 virus infection    Diverticulosis of intestine, part unspecified, without perforation or abscess without bleeding     Diverticulosis    End stage renal disease (Multi) 08/02/2021    ESRD (end stage renal disease)    Other hemorrhoids     Internal hemorrhoid    Personal history of diseases of the blood and blood-forming organs and certain disorders involving the immune mechanism     History of anemia    Personal history of other diseases of the circulatory system     History of hypertension    Personal history of other diseases of the musculoskeletal system and connective tissue     History of gout    Personal history of other endocrine, nutritional and metabolic disease     History of obesity    Personal history of other medical treatment     History of blood transfusion    Residual hemorrhoidal skin tags     External hemorrhoid    Smoker     Ulcer of anus and rectum     Rectal ulcer    Unspecified osteoarthritis, unspecified site     Osteoarthritis     Past Surgical History:   Procedure Laterality Date    CT ABDOMEN PELVIS ANGIOGRAM W AND/OR WO IV CONTRAST  5/24/2023    CT ABDOMEN PELVIS ANGIOGRAM W AND/OR WO IV CONTRAST 5/24/2023 [facility] CT    IR VENOGRAM DIALYSIS  5/26/2023    IR VENOGRAM DIALYSIS 5/26/2023 [facility]    OTHER SURGICAL  HISTORY  01/18/2022    Dialysis tunneled catheter placement    OTHER SURGICAL HISTORY  01/19/2022    Arteriovenous fistula creation procedure     No family history on file.  Social History     Tobacco Use    Smoking status: Never    Smokeless tobacco: Never   Substance Use Topics    Alcohol use: Yes     Alcohol/week: 6.0 standard drinks of alcohol     Types: 6 Cans of beer per week    Drug use: Never       Physical Exam   ED Triage Vitals   Temperature Heart Rate Respirations BP   11/26/24 1637 11/26/24 1633 11/26/24 1633 11/26/24 1633   37.3 °C (99.1 °F) 80 17 106/79      Pulse Ox Temp Source Heart Rate Source Patient Position   11/26/24 1633 11/26/24 1637 11/26/24 1907 --   99 % Temporal Monitor       BP Location FiO2 (%)     -- --             Physical Exam  Vitals and nursing note reviewed.   Constitutional:       General: He is not in acute distress.     Appearance: Normal appearance. He is normal weight. He is not ill-appearing.   HENT:      Head: Normocephalic and atraumatic.      Nose: Nose normal.      Mouth/Throat:      Mouth: Mucous membranes are dry.      Pharynx: Oropharynx is clear.   Eyes:      Extraocular Movements: Extraocular movements intact.      Conjunctiva/sclera: Conjunctivae normal.      Pupils: Pupils are equal, round, and reactive to light.   Cardiovascular:      Rate and Rhythm: Normal rate and regular rhythm.      Pulses: Normal pulses.      Heart sounds: Normal heart sounds.   Pulmonary:      Effort: Pulmonary effort is normal.      Breath sounds: Normal breath sounds.   Abdominal:      General: Abdomen is flat. Bowel sounds are normal. There is no distension.      Palpations: Abdomen is soft.      Tenderness: There is no abdominal tenderness. There is no right CVA tenderness, left CVA tenderness, guarding or rebound.   Musculoskeletal:      Cervical back: Normal range of motion and neck supple.   Neurological:      Mental Status: He is alert.           ED Course & MDM   Diagnoses as of  11/26/24 2326   Anemia, unspecified type                 No data recorded     [place] Coma Scale Score: 15 (11/26/24 1730 : Fely Steen, RN)       NIH Stroke Scale: 0 (11/26/24 1730 : Fely Steen, CANDIDO)                   Medical Decision Making  Patient did have hemoglobin noted at 4.5 here in the ED.  He started on 2 unit blood transfusion prior to following repeat CBC was at 6.2.  I did have discussion about admission for further blood transfusion given he has not fully corrected and patient did refuse any further workup.  Blood pressure stable.  Potassium stable is already been dialyzed.  Advised him on likely being sent back for further blood when he goes to his dialysis center which he did understand and did continue to refuse further blood transfusion.  Risk benefits were discussed.  Patient be discharged with return precautions.    Amount and/or Complexity of Data Reviewed  Labs: ordered. Decision-making details documented in ED Course.  ECG/medicine tests: ordered. Decision-making details documented in ED Course.     Details: EKG interpreted by me showed A-fib at a rate of 117 with no acute ischemic changes.  Is no STEMI.        Procedure  Procedures     Lucas Carrizales MD  11/26/24 2325       Lucas Carrizales MD  12/13/24 1314

## 2024-12-05 NOTE — PROGRESS NOTES
"Gastroenterology Clinic Consult Note    Reason For Consult   acute on chronic anemia, diarrhea, and unintentional weight loss    History Of Present Illness  Soren Howard is a 49 y.o. male with a past medical history of ESRD sec to HTN, on HD since 3/2022 (Dr. Charles, [facility], [facility], [facility]), h/o recurrent bleeding colonic AVMs s/p APC (and nonbleeding gastric AVMs), h/o seizures 2023, depression, prior LA grade C esophagitis now off PPI, and recurrent anemia who presents to GI clinic for acute on chronic anemia, diarrhea, and unintentional weight loss    He was undergoing eval for kidney transplant but on 10/24/2024 his eval was closed due to \"ongoing GI concerns and weight loss\".     Last time he saw blood was on Saturday (6 days ago) and it was cherry red, just small volume. Three weeks ago, he was having 24 hours of a lot more blood and it was dark maroon with black in it. About a week later he had labs checked and his hgb was 4. He was sent to the ED on 11/26/2024 for blood transfusion. He was given 2 units and hgb incremented to 6. He has not had any iron studies sent in the last year.    He has lost about 10lbs in the last 1 month.  Lost about 100lbs in the last 3-4 years. He has been having diarrhea. Walking too much and eating will bring on a bowel movement. He has to have a bowel movement immediately after waking up, even if he just had a 15 minute nap. Sometimes has solid bowel movements sometimes its more watery.  His first BM of the day is usually formed. This has been going on for the last few years but has gotten worse in the last year. He thinks he has about 18-20 bowel movements per day No nocturnal stools. Has been using immodium once or twice a day PRN and if he does, might have 15 Bms a day instead. No abdominal pain.    His episodes of hematochezia are separate from this longstanding diarrhea. He does not have bloody diarrhea. No hematemesis, no N/V. He does have early satiety and low " appetite. He denies heartburn or dysphagia. No longer on PPI.    Nofamily history of colon cancer or IBD  No abdominal surgeries  Alcohol socially  No drugs or tobacco     Past Medical History  Past Medical History:   Diagnosis Date    Angiodysplasia of stomach and duodenum without bleeding     Gastric AV malformation    COVID-19     COVID-19 virus infection    Diverticulosis of intestine, part unspecified, without perforation or abscess without bleeding     Diverticulosis    End stage renal disease (Multi) 08/02/2021    ESRD (end stage renal disease)    Other hemorrhoids     Internal hemorrhoid    Personal history of diseases of the blood and blood-forming organs and certain disorders involving the immune mechanism     History of anemia    Personal history of other diseases of the circulatory system     History of hypertension    Personal history of other diseases of the musculoskeletal system and connective tissue     History of gout    Personal history of other endocrine, nutritional and metabolic disease     History of obesity    Personal history of other medical treatment     History of blood transfusion    Residual hemorrhoidal skin tags     External hemorrhoid    Smoker     Ulcer of anus and rectum     Rectal ulcer    Unspecified osteoarthritis, unspecified site     Osteoarthritis       Surgical History  Past Surgical History:   Procedure Laterality Date    CT ABDOMEN PELVIS ANGIOGRAM W AND/OR WO IV CONTRAST  5/24/2023    CT ABDOMEN PELVIS ANGIOGRAM W AND/OR WO IV CONTRAST 5/24/2023 [facility] CT    IR VENOGRAM DIALYSIS  5/26/2023    IR VENOGRAM DIALYSIS 5/26/2023 [facility] ANGIO    OTHER SURGICAL HISTORY  01/18/2022    Dialysis tunneled catheter placement    OTHER SURGICAL HISTORY  01/19/2022    Arteriovenous fistula creation procedure       Social History  Social Drivers of Health     Tobacco Use: Low Risk  (12/12/2024)    Patient History     Smoking Tobacco Use: Never     Smokeless Tobacco Use: Never     Passive  Exposure: Not on file   Alcohol Use: Not At Risk (12/12/2024)    AUDIT-C     Frequency of Alcohol Consumption: Never     Average Number of Drinks: Patient does not drink     Frequency of Binge Drinking: Never   Recent Concern: Alcohol Use - Alcohol Misuse (10/22/2024)    AUDIT-C     Frequency of Alcohol Consumption: 2-3 times a week     Average Number of Drinks: 3 or 4     Frequency of Binge Drinking: Never   Financial Resource Strain: Medium Risk (10/22/2024)    Overall Financial Resource Strain (CARDIA)     Difficulty of Paying Living Expenses: Somewhat hard   Food Insecurity: No Food Insecurity (12/12/2024)    Hunger Vital Sign     Worried About Running Out of Food in the Last Year: Never true     Ran Out of Food in the Last Year: Never true   Transportation Needs: No Transportation Needs (10/22/2024)    PRAPARE - Transportation     Lack of Transportation (Medical): No     Lack of Transportation (Non-Medical): No   Physical Activity: Not on file   Stress: No Stress Concern Present (10/22/2024)    [employer] [place] [employer] - Occupational Stress Questionnaire     Feeling of Stress : Not at all   Social Connections: Not on file   Intimate Partner Violence: Not At Risk (10/22/2024)    Humiliation, Afraid, Rape, and Kick questionnaire     Fear of Current or Ex-Partner: No     Emotionally Abused: No     Physically Abused: No     Sexually Abused: No   Depression: Not at risk (12/12/2024)    PHQ-2     PHQ-2 Score: 0   Recent Concern: Depression - At risk (10/31/2024)    PHQ-2     PHQ-2 Score: 3   Housing Stability: Low Risk  (10/22/2024)    Housing Stability Vital Sign     Unable to Pay for Housing in the Last Year: No     Number of Times Moved in the Last Year: 0     Homeless in the Last Year: No   Recent Concern: Housing Stability - High Risk (7/24/2024)    Housing Stability Vital Sign     Unable to Pay for Housing in the Last Year: Yes     Number of Times Moved in the Last Year: 0     Homeless in the  Last Year: No   Utilities: Not At Risk (10/22/2024)    [facility] Utilities     Threatened with loss of utilities: No   Digital Equity: Not on file   Health Literacy: Not on file       Family History  No family history on file.     Allergies  Allergies   Allergen Reactions    Piperacillin-Tazobactam-Dextrs Hives       Home Medications    Current Outpatient Medications:     acetaminophen (Tylenol) 325 mg tablet, Take by mouth every 4 hours if needed., Disp: , Rfl:     amLODIPine (Norvasc) 10 mg tablet, Take 1 tablet (10 mg) by mouth once daily., Disp: 30 tablet, Rfl: 1    atorvastatin (Lipitor) 80 mg tablet, Take 1 tablet (80 mg) by mouth once daily at bedtime., Disp: , Rfl:     carvedilol (Coreg) 25 mg tablet, Take 1 tablet (25 mg) by mouth every 12 hours., Disp: 60 tablet, Rfl: 10    cholecalciferol (Vitamin D-3) 50 mcg (2,000 unit) capsule, Take 1 capsule (50 mcg) by mouth early in the morning.., Disp: , Rfl:     ferrous sulfate, 325 mg ferrous sulfate, tablet, 1 tablet PO QD, Disp: , Rfl:     hydrALAZINE (Apresoline) 100 mg tablet, Take 1 tablet (100 mg) by mouth 2 times a day., Disp: 60 tablet, Rfl: 10    levETIRAcetam (Keppra) 500 mg tablet, Take 1 tablet (500 mg) by mouth once daily. And one additional tablet after dialysis, Disp: 30 tablet, Rfl: 1    allopurinol (Zyloprim) 100 mg tablet, Take 1 tablet (100 mg) by mouth once daily. (Patient not taking: Reported on 12/12/2024), Disp: , Rfl:     calcium acetate (Phoslo) 667 mg capsule, Take 3 capsules (2,000 mg) by mouth 3 times a day with meals. (Patient not taking: Reported on 12/12/2024), Disp: 270 capsule, Rfl: 5    loperamide (Imodium) 2 mg capsule, Take 1 capsule (2 mg) by mouth 4 times a day as needed for diarrhea for up to 10 days., Disp: 90 capsule, Rfl: 3    losartan (Cozaar) 25 mg tablet, Take 1 tablet (25 mg) by mouth once daily., Disp: 30 tablet, Rfl: 1    polyethylene glycol (GaviLyte-G) 236-22.74-6.74 -5.86 gram solution, Take 4,000 mL by mouth 1 time  for 1 dose., Disp: 4000 mL, Rfl: 0    Review of Systems  See above     Last Recorded Vitals  Blood pressure 142/87, pulse 87, temperature 36.8 °C (98.2 °F), weight 76.8 kg (169 lb 4.8 oz), SpO2 96%.    Physical Exam  General: thin, appears older than stated age clothes are baggy, no acute distress  HEENT: PERRLA, EOM intact, no scleral icterus, moist MM  Respiratory: CTA bilaterally, normal work of breathing  Cardiovascular: RRR, no murmurs/rubs/gallops  Abdomen: Soft, nontender, nondistended, bowel sounds present, no masses palpated, no organomeagly  Extremities: no edema, no asterixis  Neuro: alert and oriented, CNII-XII grossly intact, moves all 4 extremities with no focal deficits    Relevant Results    Current Outpatient Medications:     acetaminophen (Tylenol) 325 mg tablet, Take by mouth every 4 hours if needed., Disp: , Rfl:     amLODIPine (Norvasc) 10 mg tablet, Take 1 tablet (10 mg) by mouth once daily., Disp: 30 tablet, Rfl: 1    atorvastatin (Lipitor) 80 mg tablet, Take 1 tablet (80 mg) by mouth once daily at bedtime., Disp: , Rfl:     carvedilol (Coreg) 25 mg tablet, Take 1 tablet (25 mg) by mouth every 12 hours., Disp: 60 tablet, Rfl: 10    cholecalciferol (Vitamin D-3) 50 mcg (2,000 unit) capsule, Take 1 capsule (50 mcg) by mouth early in the morning.., Disp: , Rfl:     ferrous sulfate, 325 mg ferrous sulfate, tablet, 1 tablet PO QD, Disp: , Rfl:     hydrALAZINE (Apresoline) 100 mg tablet, Take 1 tablet (100 mg) by mouth 2 times a day., Disp: 60 tablet, Rfl: 10    levETIRAcetam (Keppra) 500 mg tablet, Take 1 tablet (500 mg) by mouth once daily. And one additional tablet after dialysis, Disp: 30 tablet, Rfl: 1    allopurinol (Zyloprim) 100 mg tablet, Take 1 tablet (100 mg) by mouth once daily. (Patient not taking: Reported on 12/12/2024), Disp: , Rfl:     calcium acetate (Phoslo) 667 mg capsule, Take 3 capsules (2,000 mg) by mouth 3 times a day with meals. (Patient not taking: Reported on 12/12/2024),  Disp: 270 capsule, Rfl: 5    loperamide (Imodium) 2 mg capsule, Take 1 capsule (2 mg) by mouth 4 times a day as needed for diarrhea for up to 10 days., Disp: 90 capsule, Rfl: 3    losartan (Cozaar) 25 mg tablet, Take 1 tablet (25 mg) by mouth once daily., Disp: 30 tablet, Rfl: 1    polyethylene glycol (GaviLyte-G) 236-22.74-6.74 -5.86 gram solution, Take 4,000 mL by mouth 1 time for 1 dose., Disp: 4000 mL, Rfl: 0     Lab Results   Component Value Date    WBC 7.7 2024    HGB 6.2 (LL) 2024    HCT 21.3 (L) 2024    MCV 97 2024     2024     Lab Results   Component Value Date    GLUCOSE 93 2024    CALCIUM 7.6 (L) 2024     2024    K 3.5 2024    CO2 32 2024     2024    BUN 26 (H) 2024    CREATININE 8.38 (H) 2024     Lab Results   Component Value Date    ALT 7 (L) 2024    AST 8 (L) 2024    ALKPHOS 58 2024    BILITOT 0.3 2024       Imagin2024 CT angio abdomen pelvis  Impression   1. No CT angiogram evidence of active GI bleed.  2. Colonic diverticulosis. Mild inflammatory changes adjacent to  diverticula at the descending colon, suggestive acute diverticulitis.  3. Mild diffuse wall thickening at the sigmoid colon and rectum, may  be secondary to underdistention versus colitis.  4. Small hiatal hernia.  5. Mild cardiomegaly.       Procedures:    2023 Colonoscopy (Dr. Gonzalez)  Impression:            - The examined portion of the ileum was normal.                         - Diverticulosis in the sigmoid colon, in the                          descending colon, in the transverse colon and in the                          ascending colon. There was evidence of recent bleeding                          from a proximal ascending colon diverticular opening.                         - A single recently bleeding colonic angioectasia.                          Clip (MR conditional) was placed.                          - The examined portion of the ileum was normal.                         - The examination was otherwise normal on direct and                          retroflexion views.                         - No specimens collected.    5/12/2023 EGD  Impression:            - Widely patent Schatzki ring.                         - Small 2 cm hiatal hernia.                         - Mildly erythematous mucosa in the antrum, prepyloric                          region of the stomach and pylorus.                         - Mildly erythematous mucosa in the duodenal bulb.                         - No specimens collected.    1/24/2023 EGD  Impression:            - Z-line regular, 39 cm from the incisors.                         - LA Grade C reflux esophagitis with bleeding.                         - 3 cm hiatal hernia.                         - Nicole-Ferreira tear.                         - Gastritis. Biopsied.                         - Erythematous duodenopathy.    1/24/2023 Flex Sig  Impression:            - The sigmoid colon and recto-sigmoid colon are normal.                         - Blood in the rectum.                         - Multiple recently bleeding colonic angioectasias.                          Treated with argon plasma coagulation (APC).                         - Internal hemorrhoids.    8/16/2021 Colonoscopy  Impression:            - The examined portion of the ileum was normal.                         - Diverticulosis in the entire examined colon.                         - External and internal hemorrhoids.                         - Multiple recently bleeding colonic angioectasias.                          Treated with argon beam coagulation.                         - No specimens collected.    6/2013 EGD  Impression:            - A single non-bleeding angioectasia in the stomach.                          Treated with thermal therapy.                         - Hiatus hernia.                         - Mild erosive  gastropathy, likely secondary to                          NSAIDs. This was biopsied.                         - Normal duodenal bulb and 2nd part of the duodenum.     ASSESSMENT/PLAN:  Soren Howard is a 49 y.o. male with a past medical history of ESRD sec to HTN, on HD since 3/2022 (Dr. Charles, [facility] [place] [facility], [facility], [facility]), h/o recurrent bleeding colonic AVMs s/p APC (and nonbleeding gastric AVMs), h/o seizures 2023, depression, prior LA grade C esophagitis now off PPI, and recurrent anemia who presents to GI clinic for acute on chronic anemia, diarrhea, and unintentional weight loss    Plan:  -CT chest AP without contrast to rule out large malignancy  -Check CBC and ferritin/iron panel  -EGD/colonoscopy given recurrent bleeding, unintentional weight loss, and hx AVMs. Plan for random colon biopsies at that time given ongoing diarrhea   -Continue ferrous sulfate for now  -Increase Imodium to 2mg QID PRN  -Start metamucil 1 tablespoon in a glass of water once daily  -RTC 3 months    I spent 63 minutes in the professional and overall care of this patient.    Pallavi Finch MD

## 2024-12-12 ENCOUNTER — OFFICE VISIT (OUTPATIENT)
Dept: GASTROENTEROLOGY | Facility: HOSPITAL | Age: 49
End: 2024-12-12
Payer: COMMERCIAL

## 2024-12-12 VITALS
HEART RATE: 87 BPM | OXYGEN SATURATION: 96 % | SYSTOLIC BLOOD PRESSURE: 142 MMHG | DIASTOLIC BLOOD PRESSURE: 87 MMHG | TEMPERATURE: 98.2 F | WEIGHT: 169.3 LBS | BODY MASS INDEX: 26.52 KG/M2

## 2024-12-12 DIAGNOSIS — Z01.818 PRE-TRANSPLANT EVALUATION FOR KIDNEY TRANSPLANT: ICD-10-CM

## 2024-12-12 DIAGNOSIS — R63.4 WEIGHT LOSS: Primary | ICD-10-CM

## 2024-12-12 DIAGNOSIS — R19.7 DIARRHEA, UNSPECIFIED TYPE: ICD-10-CM

## 2024-12-12 DIAGNOSIS — K92.2 GASTROINTESTINAL HEMORRHAGE, UNSPECIFIED GASTROINTESTINAL HEMORRHAGE TYPE: ICD-10-CM

## 2024-12-12 RX ORDER — LOPERAMIDE HYDROCHLORIDE 2 MG/1
2 CAPSULE ORAL 4 TIMES DAILY PRN
Qty: 90 CAPSULE | Refills: 3 | Status: SHIPPED | OUTPATIENT
Start: 2024-12-12 | End: 2024-12-22

## 2024-12-12 RX ORDER — POLYETHYLENE GLYCOL 3350, SODIUM SULFATE ANHYDROUS, SODIUM BICARBONATE, SODIUM CHLORIDE, POTASSIUM CHLORIDE 236; 22.74; 6.74; 5.86; 2.97 G/4L; G/4L; G/4L; G/4L; G/4L
4 POWDER, FOR SOLUTION ORAL ONCE
Qty: 4000 ML | Refills: 0 | Status: SHIPPED | OUTPATIENT
Start: 2024-12-12 | End: 2024-12-12

## 2024-12-12 RX ORDER — ACETAMINOPHEN 325 MG/1
TABLET ORAL EVERY 4 HOURS PRN
COMMUNITY
Start: 2023-05-26

## 2024-12-12 RX ORDER — FERROUS SULFATE 325(65) MG
TABLET ORAL
COMMUNITY
Start: 2024-03-31

## 2024-12-12 SDOH — ECONOMIC STABILITY: FOOD INSECURITY: WITHIN THE PAST 12 MONTHS, YOU WORRIED THAT YOUR FOOD WOULD RUN OUT BEFORE YOU GOT MONEY TO BUY MORE.: NEVER TRUE

## 2024-12-12 SDOH — ECONOMIC STABILITY: FOOD INSECURITY: WITHIN THE PAST 12 MONTHS, THE FOOD YOU BOUGHT JUST DIDN'T LAST AND YOU DIDN'T HAVE MONEY TO GET MORE.: NEVER TRUE

## 2024-12-12 ASSESSMENT — LIFESTYLE VARIABLES
HOW OFTEN DO YOU HAVE SIX OR MORE DRINKS ON ONE OCCASION: NEVER
HOW MANY STANDARD DRINKS CONTAINING ALCOHOL DO YOU HAVE ON A TYPICAL DAY: PATIENT DOES NOT DRINK
AUDIT-C TOTAL SCORE: 0
SKIP TO QUESTIONS 9-10: 1
HOW OFTEN DO YOU HAVE A DRINK CONTAINING ALCOHOL: NEVER

## 2024-12-12 ASSESSMENT — PAIN SCALES - GENERAL: PAINLEVEL_OUTOF10: 0-NO PAIN

## 2024-12-12 NOTE — PATIENT INSTRUCTIONS
Thank you for coming to GI clinic today  Please go to the lab to have your blood drawn before you leave today  Please call [phone] to schedule your procedure(s) - EGD and colonoscopy  Please schedule the CT scan of your chest and abdomen/pelvis when you can  Golytely has been ordered to your pharmacy for the bowel prep for your procedure  You can take Imodium (loperamide) 2mg four times daily  Start metamucil (purchased over the counter) 1 tablespoon one to too times daily to see if this will help bulk up your stools a bit  Continue take your iron tablets for now  Come back and see me in 3 months

## 2024-12-16 NOTE — PROGRESS NOTES
I saw and evaluated the patient. I personally obtained the key and critical portions of the history and physical exam or was physically present for key and critical portions performed by the resident/fellow. I reviewed the resident/fellow's documentation and discussed the patient with the resident/fellow. I agree with the resident/fellow's medical decision making as documented in the note.    I spent 15 minutes in the professional and overall care of this patient.    Nydia Quinones MD

## 2024-12-17 DIAGNOSIS — I15.8 OTHER SECONDARY HYPERTENSION: ICD-10-CM

## 2024-12-17 DIAGNOSIS — R19.7 DIARRHEA, UNSPECIFIED TYPE: ICD-10-CM

## 2024-12-17 DIAGNOSIS — I12.9 HYPERTENSIVE CHRONIC KIDNEY DISEASE WITH STAGE 1 THROUGH STAGE 4 CHRONIC KIDNEY DISEASE, OR UNSPECIFIED CHRONIC KIDNEY DISEASE: ICD-10-CM

## 2024-12-17 DIAGNOSIS — N18.6 ESRD (END STAGE RENAL DISEASE) (MULTI): ICD-10-CM

## 2024-12-17 RX ORDER — FERROUS SULFATE 325(65) MG
325 TABLET ORAL 2 TIMES DAILY
Qty: 60 TABLET | Refills: 8 | Status: SHIPPED | OUTPATIENT
Start: 2024-12-17

## 2024-12-17 RX ORDER — LOPERAMIDE HYDROCHLORIDE 2 MG/1
2 CAPSULE ORAL 4 TIMES DAILY PRN
Qty: 90 CAPSULE | Refills: 3 | Status: SHIPPED | OUTPATIENT
Start: 2024-12-17 | End: 2025-03-17

## 2024-12-17 RX ORDER — SODIUM BICARBONATE 650 MG/1
1300 TABLET ORAL 2 TIMES DAILY
Qty: 180 TABLET | Refills: 5 | Status: SHIPPED | OUTPATIENT
Start: 2024-12-17 | End: 2025-12-17

## 2024-12-17 RX ORDER — CALCIUM ACETATE 667 MG/1
2000 CAPSULE ORAL
Qty: 270 CAPSULE | Refills: 5 | Status: SHIPPED | OUTPATIENT
Start: 2024-12-17 | End: 2025-06-15

## 2024-12-17 RX ORDER — ACETAMINOPHEN 500 MG
2000 TABLET ORAL DAILY
Qty: 60 CAPSULE | Refills: 4 | Status: SHIPPED | OUTPATIENT
Start: 2024-12-17

## 2024-12-17 RX ORDER — AMLODIPINE BESYLATE 10 MG/1
10 TABLET ORAL DAILY
Qty: 30 TABLET | Refills: 1 | Status: SHIPPED | OUTPATIENT
Start: 2024-12-17 | End: 2025-02-15

## 2025-01-07 ENCOUNTER — LAB (OUTPATIENT)
Dept: LAB | Facility: LAB | Age: 50
End: 2025-01-07
Payer: COMMERCIAL

## 2025-01-07 ENCOUNTER — HOSPITAL ENCOUNTER (OUTPATIENT)
Dept: RADIOLOGY | Facility: HOSPITAL | Age: 50
Discharge: HOME | End: 2025-01-07
Payer: COMMERCIAL

## 2025-01-07 DIAGNOSIS — Z01.818 PRE-TRANSPLANT EVALUATION FOR KIDNEY TRANSPLANT: ICD-10-CM

## 2025-01-07 LAB
BASOPHILS # BLD AUTO: 0.01 X10*3/UL (ref 0–0.1)
BASOPHILS NFR BLD AUTO: 0.2 %
EOSINOPHIL # BLD AUTO: 0.46 X10*3/UL (ref 0–0.7)
EOSINOPHIL NFR BLD AUTO: 8.8 %
ERYTHROCYTE [DISTWIDTH] IN BLOOD BY AUTOMATED COUNT: 17.1 % (ref 11.5–14.5)
FERRITIN SERPL-MCNC: 573 NG/ML (ref 20–300)
HCT VFR BLD AUTO: 32.9 % (ref 41–52)
HGB BLD-MCNC: 9.7 G/DL (ref 13.5–17.5)
IMM GRANULOCYTES # BLD AUTO: 0.03 X10*3/UL (ref 0–0.7)
IMM GRANULOCYTES NFR BLD AUTO: 0.6 % (ref 0–0.9)
IRON SATN MFR SERPL: 8 % (ref 25–45)
IRON SERPL-MCNC: 15 UG/DL (ref 35–150)
LYMPHOCYTES # BLD AUTO: 0.52 X10*3/UL (ref 1.2–4.8)
LYMPHOCYTES NFR BLD AUTO: 10 %
MCH RBC QN AUTO: 26.6 PG (ref 26–34)
MCHC RBC AUTO-ENTMCNC: 29.5 G/DL (ref 32–36)
MCV RBC AUTO: 90 FL (ref 80–100)
MONOCYTES # BLD AUTO: 1.09 X10*3/UL (ref 0.1–1)
MONOCYTES NFR BLD AUTO: 20.9 %
NEUTROPHILS # BLD AUTO: 3.1 X10*3/UL (ref 1.2–7.7)
NEUTROPHILS NFR BLD AUTO: 59.5 %
NRBC BLD-RTO: 0 /100 WBCS (ref 0–0)
PLATELET # BLD AUTO: 284 X10*3/UL (ref 150–450)
RBC # BLD AUTO: 3.64 X10*6/UL (ref 4.5–5.9)
TIBC SERPL-MCNC: 194 UG/DL (ref 240–445)
UIBC SERPL-MCNC: 179 UG/DL (ref 110–370)
WBC # BLD AUTO: 5.2 X10*3/UL (ref 4.4–11.3)

## 2025-01-07 PROCEDURE — 71250 CT THORAX DX C-: CPT | Performed by: STUDENT IN AN ORGANIZED HEALTH CARE EDUCATION/TRAINING PROGRAM

## 2025-01-07 PROCEDURE — 74176 CT ABD & PELVIS W/O CONTRAST: CPT | Performed by: STUDENT IN AN ORGANIZED HEALTH CARE EDUCATION/TRAINING PROGRAM

## 2025-01-07 PROCEDURE — 74176 CT ABD & PELVIS W/O CONTRAST: CPT

## 2025-01-20 ENCOUNTER — TELEPHONE (OUTPATIENT)
Facility: HOSPITAL | Age: 50
End: 2025-01-20
Payer: COMMERCIAL

## 2025-01-24 NOTE — TELEPHONE ENCOUNTER
Surgeon received copy of CT CAP ordered by GI.  Recommending he sees pulm.  Called and let patient know recommendation and faxed copies to neph and dialysis unit.

## 2025-02-27 ENCOUNTER — ANESTHESIA EVENT (OUTPATIENT)
Dept: GASTROENTEROLOGY | Facility: HOSPITAL | Age: 50
End: 2025-02-27
Payer: COMMERCIAL

## 2025-02-27 NOTE — ANESTHESIA PREPROCEDURE EVALUATION
Patient: Soren Howard    Procedure Information       Date/Time: 02/28/25 0730    Scheduled providers: Samantha Story MD    Procedures:       COLONOSCOPY      EGD    Location: [facility]            Relevant Problems   Cardiac   (+) Benign hypertension   (+) Hypertensive urgency   (+) Mixed hyperlipidemia   (+) PAD (peripheral artery disease) (CMS-HCC)      Neuro   (+) Seizure (Multi) (Last oneone yr ago off 2 months)      GI   (+) GIB (gastrointestinal bleeding)   (+) Gastroesophageal reflux disease without esophagitis      /Renal   (+) ESRD (end stage renal disease) on dialysis (Multi)      Endocrine   (+) Morbid obesity (Multi)      Hematology   (+) Anemia due to stage 4 chronic kidney disease (Multi)   (+) Anemia requiring transfusions   (+) Anemia, unspecified      Musculoskeletal   (+) Osteoarthritis      ID   (+) Bacteremia associated with intravascular line (CMS-[facility])   (+) Intestinal infection due to bacteria causing bloody diarrhea     49 y.o. male with a past medical history of ESRD sec to HTN, on HD since 3/2022 (Dr. Charles, [facility], [facility], [facility]), h/o recurrent bleeding colonic AVMs s/p APC (and nonbleeding gastric AVMs), h/o seizures 2023, depression, prior LA grade C esophagitis now off PPI, and recurrent anemia who presents to GI clinic for acute on chronic anemia,   Clinical information reviewed:                   NPO Detail:  No data recorded     Physical Exam    Airway  Mallampati: II  TM distance: >3 FB  Neck ROM: full     Cardiovascular - normal exam     Dental   Comments: intact   Pulmonary - normal exam     Abdominal          Vitals:    02/28/25 0648   BP: (!) 175/115   Pulse: 79   Resp: 15   Temp: 36.3 °C (97.3 °F)   SpO2: 96%       Past Surgical History:   Procedure Laterality Date    CT ABDOMEN PELVIS ANGIOGRAM W AND/OR WO IV CONTRAST  5/24/2023    CT ABDOMEN PELVIS ANGIOGRAM W AND/OR WO IV CONTRAST 5/24/2023 [facility] CT    IR VENOGRAM DIALYSIS  5/26/2023    IR VENOGRAM  DIALYSIS 5/26/2023 [facility]GIO    OTHER SURGICAL HISTORY  01/18/2022    Dialysis tunneled catheter placement    OTHER SURGICAL HISTORY  01/19/2022    Arteriovenous fistula creation procedure     Past Medical History:   Diagnosis Date    Angiodysplasia of stomach and duodenum without bleeding     Gastric AV malformation    COVID-19     COVID-19 virus infection    Diverticulosis of intestine, part unspecified, without perforation or abscess without bleeding     Diverticulosis    End stage renal disease (Multi) 08/02/2021    ESRD (end stage renal disease)    Other hemorrhoids     Internal hemorrhoid    Personal history of diseases of the blood and blood-forming organs and certain disorders involving the immune mechanism     History of anemia    Personal history of other diseases of the circulatory system     History of hypertension    Personal history of other diseases of the musculoskeletal system and connective tissue     History of gout    Personal history of other endocrine, nutritional and metabolic disease     History of obesity    Personal history of other medical treatment     History of blood transfusion    Residual hemorrhoidal skin tags     External hemorrhoid    Smoker     Ulcer of anus and rectum     Rectal ulcer    Unspecified osteoarthritis, unspecified site     Osteoarthritis       Current Outpatient Medications:     acetaminophen (Tylenol) 325 mg tablet, Take by mouth every 4 hours if needed., Disp: , Rfl:     atorvastatin (Lipitor) 80 mg tablet, Take 1 tablet (80 mg) by mouth once daily at bedtime., Disp: , Rfl:     calcium acetate (Phoslo) 667 mg capsule, Take 3 capsules (2,000 mg) by mouth 3 times daily (morning, midday, late afternoon)., Disp: 270 capsule, Rfl: 5    carvedilol (Coreg) 25 mg tablet, Take 1 tablet (25 mg) by mouth every 12 hours., Disp: 60 tablet, Rfl: 10    cholecalciferol (Vitamin D-3) 50 mcg (2,000 unit) capsule, Take 1 capsule (50 mcg) by mouth early in the morning.., Disp: 60  capsule, Rfl: 4    hydrALAZINE (Apresoline) 100 mg tablet, Take 1 tablet (100 mg) by mouth 2 times a day., Disp: 60 tablet, Rfl: 10    loperamide (Imodium) 2 mg capsule, Take 1 capsule (2 mg) by mouth 4 times a day as needed for diarrhea., Disp: 90 capsule, Rfl: 3    sodium bicarbonate 650 mg tablet, Take 2 tablets (1,300 mg) by mouth 2 times a day., Disp: 180 tablet, Rfl: 5    allopurinol (Zyloprim) 100 mg tablet, Take 1 tablet (100 mg) by mouth once daily. (Patient not taking: Reported on 12/12/2024), Disp: , Rfl:     amLODIPine (Norvasc) 10 mg tablet, Take 1 tablet (10 mg) by mouth once daily., Disp: 30 tablet, Rfl: 1    ferrous sulfate, 325 mg ferrous sulfate, tablet, Take 1 tablet (325 mg) by mouth 2 times a day. (Patient not taking: Reported on 2/28/2025), Disp: 60 tablet, Rfl: 8    levETIRAcetam (Keppra) 500 mg tablet, Take 1 tablet (500 mg) by mouth once daily. And one additional tablet after dialysis, Disp: 30 tablet, Rfl: 1    losartan (Cozaar) 25 mg tablet, Take 1 tablet (25 mg) by mouth once daily., Disp: 30 tablet, Rfl: 1  Prior to Admission medications    Medication Sig Start Date End Date Taking? Authorizing Provider   acetaminophen (Tylenol) 325 mg tablet Take by mouth every 4 hours if needed. 5/26/23  Yes Historical Provider, MD   atorvastatin (Lipitor) 80 mg tablet Take 1 tablet (80 mg) by mouth once daily at bedtime. 6/1/19  Yes Historical Provider, MD   calcium acetate (Phoslo) 667 mg capsule Take 3 capsules (2,000 mg) by mouth 3 times daily (morning, midday, late afternoon). 12/17/24 6/15/25 Yes Cosmo Charles,    carvedilol (Coreg) 25 mg tablet Take 1 tablet (25 mg) by mouth every 12 hours. 7/18/24  Yes Cosmo Charles, DO   cholecalciferol (Vitamin D-3) 50 mcg (2,000 unit) capsule Take 1 capsule (50 mcg) by mouth early in the morning.. 12/17/24  Yes Cosmo Charles DO   hydrALAZINE (Apresoline) 100 mg tablet Take 1 tablet (100 mg) by mouth 2 times a day. 7/18/24  Yes Cosmo Charles DO    loperamide (Imodium) 2 mg capsule Take 1 capsule (2 mg) by mouth 4 times a day as needed for diarrhea. 12/17/24 3/17/25 Yes Cosmo Charles DO   sodium bicarbonate 650 mg tablet Take 2 tablets (1,300 mg) by mouth 2 times a day. 12/17/24 12/17/25 Yes Cosmo Charles DO   allopurinol (Zyloprim) 100 mg tablet Take 1 tablet (100 mg) by mouth once daily.  Patient not taking: Reported on 12/12/2024    Historical Provider, MD   amLODIPine (Norvasc) 10 mg tablet Take 1 tablet (10 mg) by mouth once daily. 12/17/24 2/15/25  Cosmo Charles DO   ferrous sulfate, 325 mg ferrous sulfate, tablet Take 1 tablet (325 mg) by mouth 2 times a day.  Patient not taking: Reported on 2/28/2025 12/17/24   Cosmo Charles DO   levETIRAcetam (Keppra) 500 mg tablet Take 1 tablet (500 mg) by mouth once daily. And one additional tablet after dialysis 7/26/24 12/12/24  Aly Hankins MD   losartan (Cozaar) 25 mg tablet Take 1 tablet (25 mg) by mouth once daily. 7/26/24 9/24/24  Aly Hankins MD     Allergies   Allergen Reactions    Piperacillin-Tazobactam-Nidia Hives     Social History     Tobacco Use    Smoking status: Never    Smokeless tobacco: Never   Substance Use Topics    Alcohol use: Not Currently     Alcohol/week: 6.0 standard drinks of alcohol     Types: 6 Cans of beer per week     Comment: QUIT A MONTH AGO         Chemistry    Lab Results   Component Value Date/Time     11/26/2024 1653    K 3.5 11/26/2024 1653     11/26/2024 1653    CO2 32 11/26/2024 1653    BUN 26 (H) 11/26/2024 1653    CREATININE 8.38 (H) 11/26/2024 1653    Lab Results   Component Value Date/Time    CALCIUM 7.6 (L) 11/26/2024 1653    ALKPHOS 58 11/26/2024 1653    AST 8 (L) 11/26/2024 1653    ALT 7 (L) 11/26/2024 1653    BILITOT 0.3 11/26/2024 1653          Lab Results   Component Value Date/Time    WBC 5.2 01/07/2025 1531    HGB 9.7 (L) 01/07/2025 1531    HCT 32.9 (L) 01/07/2025 1531     01/07/2025 1531     Lab Results   Component Value  Date/Time    PROTIME 12.8 10/22/2024 1117    INR 1.1 10/22/2024 1117     Encounter Date: 11/26/24   ECG 12 Lead   Result Value    Ventricular Rate 117    QRS Duration 114    QT Interval 292    QTC Calculation(Bazett) 407    R Axis -5    T Axis 170    QRS Count 19    Q Onset 214    T Offset 360    QTC Fredericia 365    Narrative    Atrial fibrillation with rapid ventricular response  Minimal voltage criteria for LVH, may be normal variant ( Gaurav product )  T wave abnormality, consider lateral ischemia  Abnormal ECG  When compared with ECG of 10-OCT-2024 06:58,  Atrial fibrillation has replaced Sinus rhythm  Vent. rate has increased BY  40 BPM  See ED provider note for full interpretation and clinical correlation  Confirmed by Morena Strong ([id number]) on 11/27/2024 11:48:59 AM     No results found for this or any previous visit from the past 1095 days.       Anesthesia Plan    History of general anesthesia?: yes  History of complications of general anesthesia?: no    ASA 3     MAC     intravenous induction   Anesthetic plan and risks discussed with patient.  Use of blood products discussed with patient who consented to blood products.    Plan discussed with CAA and CRNA.

## 2025-02-28 ENCOUNTER — HOSPITAL ENCOUNTER (OUTPATIENT)
Dept: GASTROENTEROLOGY | Facility: HOSPITAL | Age: 50
Discharge: HOME | End: 2025-02-28
Payer: COMMERCIAL

## 2025-02-28 ENCOUNTER — ANESTHESIA (OUTPATIENT)
Dept: GASTROENTEROLOGY | Facility: HOSPITAL | Age: 50
End: 2025-02-28
Payer: COMMERCIAL

## 2025-02-28 VITALS
HEIGHT: 67 IN | RESPIRATION RATE: 16 BRPM | DIASTOLIC BLOOD PRESSURE: 109 MMHG | SYSTOLIC BLOOD PRESSURE: 153 MMHG | BODY MASS INDEX: 26.53 KG/M2 | TEMPERATURE: 98.4 F | OXYGEN SATURATION: 98 % | WEIGHT: 169 LBS | HEART RATE: 82 BPM

## 2025-02-28 DIAGNOSIS — K92.1 MELENA: Primary | ICD-10-CM

## 2025-02-28 DIAGNOSIS — Z01.818 PRE-TRANSPLANT EVALUATION FOR KIDNEY TRANSPLANT: ICD-10-CM

## 2025-02-28 PROCEDURE — 43239 EGD BIOPSY SINGLE/MULTIPLE: CPT | Performed by: STUDENT IN AN ORGANIZED HEALTH CARE EDUCATION/TRAINING PROGRAM

## 2025-02-28 PROCEDURE — 7100000010 HC PHASE TWO TIME - EACH INCREMENTAL 1 MINUTE

## 2025-02-28 PROCEDURE — 45378 DIAGNOSTIC COLONOSCOPY: CPT | Performed by: STUDENT IN AN ORGANIZED HEALTH CARE EDUCATION/TRAINING PROGRAM

## 2025-02-28 PROCEDURE — 2500000004 HC RX 250 GENERAL PHARMACY W/ HCPCS (ALT 636 FOR OP/ED): Mod: SE

## 2025-02-28 PROCEDURE — 3700000001 HC GENERAL ANESTHESIA TIME - INITIAL BASE CHARGE

## 2025-02-28 PROCEDURE — 3700000002 HC GENERAL ANESTHESIA TIME - EACH INCREMENTAL 1 MINUTE

## 2025-02-28 PROCEDURE — 7100000009 HC PHASE TWO TIME - INITIAL BASE CHARGE

## 2025-02-28 RX ORDER — HYDRALAZINE HYDROCHLORIDE 20 MG/ML
INJECTION INTRAMUSCULAR; INTRAVENOUS AS NEEDED
Status: DISCONTINUED | OUTPATIENT
Start: 2025-02-28 | End: 2025-02-28

## 2025-02-28 RX ORDER — ACETAMINOPHEN 325 MG/1
650 TABLET ORAL EVERY 4 HOURS PRN
Status: DISCONTINUED | OUTPATIENT
Start: 2025-02-28 | End: 2025-03-01 | Stop reason: HOSPADM

## 2025-02-28 RX ORDER — PROPOFOL 10 MG/ML
INJECTION, EMULSION INTRAVENOUS CONTINUOUS PRN
Status: DISCONTINUED | OUTPATIENT
Start: 2025-02-28 | End: 2025-02-28

## 2025-02-28 RX ORDER — MIDAZOLAM HYDROCHLORIDE 1 MG/ML
INJECTION INTRAMUSCULAR; INTRAVENOUS CONTINUOUS PRN
Status: DISCONTINUED | OUTPATIENT
Start: 2025-02-28 | End: 2025-02-28

## 2025-02-28 RX ORDER — SODIUM CHLORIDE, SODIUM LACTATE, POTASSIUM CHLORIDE, CALCIUM CHLORIDE 600; 310; 30; 20 MG/100ML; MG/100ML; MG/100ML; MG/100ML
100 INJECTION, SOLUTION INTRAVENOUS CONTINUOUS
Status: DISCONTINUED | OUTPATIENT
Start: 2025-02-28 | End: 2025-03-01 | Stop reason: HOSPADM

## 2025-02-28 RX ORDER — LIDOCAINE HYDROCHLORIDE 10 MG/ML
0.1 INJECTION, SOLUTION EPIDURAL; INFILTRATION; INTRACAUDAL; PERINEURAL ONCE
Status: DISCONTINUED | OUTPATIENT
Start: 2025-02-28 | End: 2025-03-01 | Stop reason: HOSPADM

## 2025-02-28 RX ORDER — ONDANSETRON HYDROCHLORIDE 2 MG/ML
4 INJECTION, SOLUTION INTRAVENOUS ONCE AS NEEDED
Status: DISCONTINUED | OUTPATIENT
Start: 2025-02-28 | End: 2025-03-01 | Stop reason: HOSPADM

## 2025-02-28 RX ORDER — LIDOCAINE HCL/PF 100 MG/5ML
SYRINGE (ML) INTRAVENOUS AS NEEDED
Status: DISCONTINUED | OUTPATIENT
Start: 2025-02-28 | End: 2025-02-28

## 2025-02-28 RX ORDER — LABETALOL HYDROCHLORIDE 5 MG/ML
INJECTION, SOLUTION INTRAVENOUS AS NEEDED
Status: DISCONTINUED | OUTPATIENT
Start: 2025-02-28 | End: 2025-02-28

## 2025-02-28 RX ADMIN — HYDRALAZINE HYDROCHLORIDE 5 MG: 20 INJECTION INTRAMUSCULAR; INTRAVENOUS at 07:28

## 2025-02-28 RX ADMIN — PROPOFOL 20 MG: 10 INJECTION, EMULSION INTRAVENOUS at 08:14

## 2025-02-28 RX ADMIN — PROPOFOL 50 MG: 10 INJECTION, EMULSION INTRAVENOUS at 08:06

## 2025-02-28 RX ADMIN — PROPOFOL 200 MCG/KG/MIN: 10 INJECTION, EMULSION INTRAVENOUS at 08:05

## 2025-02-28 RX ADMIN — LABETALOL HYDROCHLORIDE 10 MG: 5 INJECTION INTRAVENOUS at 08:07

## 2025-02-28 RX ADMIN — LIDOCAINE HYDROCHLORIDE 70 MG: 20 INJECTION INTRAVENOUS at 08:04

## 2025-02-28 ASSESSMENT — PAIN - FUNCTIONAL ASSESSMENT
PAIN_FUNCTIONAL_ASSESSMENT: 0-10

## 2025-02-28 ASSESSMENT — PAIN SCALES - GENERAL
PAINLEVEL_OUTOF10: 0 - NO PAIN

## 2025-02-28 NOTE — H&P
History Of Present Illness  Soren Howard is a 49 y.o. male presenting with melena and anemia.     Past Medical History  Past Medical History:   Diagnosis Date    Angiodysplasia of stomach and duodenum without bleeding     Gastric AV malformation    COVID-19     COVID-19 virus infection    Diverticulosis of intestine, part unspecified, without perforation or abscess without bleeding     Diverticulosis    End stage renal disease (Multi) 08/02/2021    ESRD (end stage renal disease)    Other hemorrhoids     Internal hemorrhoid    Personal history of diseases of the blood and blood-forming organs and certain disorders involving the immune mechanism     History of anemia    Personal history of other diseases of the circulatory system     History of hypertension    Personal history of other diseases of the musculoskeletal system and connective tissue     History of gout    Personal history of other endocrine, nutritional and metabolic disease     History of obesity    Personal history of other medical treatment     History of blood transfusion    Residual hemorrhoidal skin tags     External hemorrhoid    Smoker     Ulcer of anus and rectum     Rectal ulcer    Unspecified osteoarthritis, unspecified site     Osteoarthritis     Surgical History  Past Surgical History:   Procedure Laterality Date    CT ABDOMEN PELVIS ANGIOGRAM W AND/OR WO IV CONTRAST  5/24/2023    CT ABDOMEN PELVIS ANGIOGRAM W AND/OR WO IV CONTRAST 5/24/2023 [facility] CT    IR VENOGRAM DIALYSIS  5/26/2023    IR VENOGRAM DIALYSIS 5/26/2023 [facility]    OTHER SURGICAL HISTORY  01/18/2022    Dialysis tunneled catheter placement    OTHER SURGICAL HISTORY  01/19/2022    Arteriovenous fistula creation procedure     Social History  He reports that he has never smoked. He has never used smokeless tobacco. He reports that he does not currently use alcohol after a past usage of about 6.0 standard drinks of alcohol per week. He reports that he does not use drugs.    Family  "History  No family history on file.     Allergies  Allergies   Allergen Reactions    Piperacillin-Tazobactam-Dextrs Hives     Review of Systems  Patient denies any nausea, vomiting  No chronic GERD symptoms, no dysphagia  No diarrhea, constipation or abdominal pain  No jaundice  No blood in stool or melena  No weight loss or MARYBETH      Physical Exam  Gen: Alert and oriented, No acute distress   HEENT: EOMI, BRIEN, no pallor, no jaundice   Neck: Supple   Resp: b/l clear, no wheezes, rhonchi or crackles   CVS: S1S2+, RRR   GI: soft, NT, ND, BS+   Ext: No edema      Last Recorded Vitals  Blood pressure (!) 175/115, pulse 79, temperature 36.3 °C (97.3 °F), temperature source Temporal, resp. rate 15, height 1.702 m (5' 7\"), weight 76.7 kg (169 lb), SpO2 96%.    Assessment/Plan   # Melena  # Anemia  # Chronic diarrhea  Proceed with egd and colonoscopy     Samantha Story MD  "

## 2025-02-28 NOTE — ANESTHESIA POSTPROCEDURE EVALUATION
Patient: Soren Howard    Procedure Summary       Date: 02/28/25 Room / Location: [facility]    Anesthesia Start: 0801 Anesthesia Stop: 0837    Procedures:       COLONOSCOPY      EGD Diagnosis:       Pre-transplant evaluation for kidney transplant      Melena    Scheduled Providers: Samantha Story MD Responsible Provider: Leticia Juárez MD    Anesthesia Type: MAC ASA Status: 3            Anesthesia Type: MAC    Vitals Value Taken Time   /88 02/28/25 0837   Temp 36.9 02/28/25 0837   Pulse 90 02/28/25 0837   Resp 13 02/28/25 0837   SpO2 99 02/28/25 0837       Anesthesia Post Evaluation    Patient location during evaluation: PACU  Patient participation: waiting for patient participation  Level of consciousness: sleepy but conscious  Pain management: adequate  Airway patency: patent  Cardiovascular status: hemodynamically stable and acceptable  Respiratory status: spontaneous ventilation and room air  Hydration status: acceptable  Postoperative Nausea and Vomiting: none        There were no known notable events for this encounter.

## 2025-03-05 LAB
LABORATORY COMMENT REPORT: NORMAL
PATH REPORT.FINAL DX SPEC: NORMAL
PATH REPORT.GROSS SPEC: NORMAL
PATH REPORT.TOTAL CANCER: NORMAL

## 2025-03-05 NOTE — PROGRESS NOTES
Gastroenterology Clinic Note    History Of Present Illness  Soren Howard is a 49 y.o. male with a past medical history of ESRD sec to HTN, on HD since 3/2022 ([place]. Charles, [facility], [facility], [facility]), h/o recurrent bleeding colonic AVMs s/p APC (and nonbleeding gastric AVMs), h/o seizures 2023, depression, prior LA grade C esophagitis now off PPI, and recurrent anemia who presents to GI clinic for acute on chronic anemia, diarrhea, and unintentional weight loss     Initially seen by me 12/2024 - had just had ER visit for hematochezia and acute anemia. He was transufed 2 units pRBCs for hgb 4 and incremented appropriately and was discharged from the ED.  More concerningly, he was having diarrhea with 18-20 Bms daily with no nocturnal stools for the lat 3-4 years. Had lost bout 100lbs in the same amount of time. No abdominal pain, was only using imodium PRN at that time    In the interim, we sent him for EGD/colonoscopy. EGD showed LA grade A esophagitis and gastritis, gastric bx negative for Hpylori. Colonoscopy was normal besides diverticlosis and hemorrhoids. No biopsies were taken. He had CT chest abdomen pelvis which showed moderate R pleural effusion with pleural thickening and pleural calcifications there were some faint/focal pancreatic calcifications noted.     Subjective  Down to 6 Bms daily on loperamide 2mg four tablets all at once. One Bm at night. Weight stable. He does not have any shortness of breath, no further GI bleeding/hematochezia    Regarding his pleural effusion, he denies any SOB or chest pain    Medications:    Current Outpatient Medications:     allopurinol (Zyloprim) 100 mg tablet, Take 1 tablet (100 mg) by mouth once daily., Disp: , Rfl:     amLODIPine (Norvasc) 10 mg tablet, Take 1 tablet (10 mg) by mouth once daily., Disp: 30 tablet, Rfl: 1    carvedilol (Coreg) 25 mg tablet, Take 1 tablet (25 mg) by mouth every 12 hours., Disp: 60 tablet, Rfl: 10    fluticasone (Flonase) 50 mcg/actuation  nasal spray, , Disp: , Rfl:     hydrALAZINE (Apresoline) 100 mg tablet, Take 1 tablet (100 mg) by mouth 2 times a day., Disp: 60 tablet, Rfl: 10    loperamide (Imodium) 2 mg capsule, Take 1 capsule (2 mg) by mouth 4 times a day as needed for diarrhea., Disp: 90 capsule, Rfl: 3    acetaminophen (Tylenol) 325 mg tablet, Take by mouth every 4 hours if needed. (Patient not taking: Reported on 3/6/2025), Disp: , Rfl:     atorvastatin (Lipitor) 80 mg tablet, Take 1 tablet (80 mg) by mouth once daily at bedtime. (Patient not taking: Reported on 3/6/2025), Disp: , Rfl:     calcium acetate (Phoslo) 667 mg capsule, Take 3 capsules (2,000 mg) by mouth 3 times daily (morning, midday, late afternoon). (Patient not taking: Reported on 3/6/2025), Disp: 270 capsule, Rfl: 5    cholecalciferol (Vitamin D-3) 50 mcg (2,000 unit) capsule, Take 1 capsule (50 mcg) by mouth early in the morning.. (Patient not taking: Reported on 3/6/2025), Disp: 60 capsule, Rfl: 4    ferrous sulfate, 325 mg ferrous sulfate, tablet, Take 1 tablet (325 mg) by mouth 2 times a day. (Patient not taking: Reported on 3/6/2025), Disp: 60 tablet, Rfl: 8    levETIRAcetam (Keppra) 500 mg tablet, Take 1 tablet (500 mg) by mouth once daily. And one additional tablet after dialysis, Disp: 30 tablet, Rfl: 1    losartan (Cozaar) 25 mg tablet, Take 1 tablet (25 mg) by mouth once daily., Disp: 30 tablet, Rfl: 1    sodium bicarbonate 650 mg tablet, Take 2 tablets (1,300 mg) by mouth 2 times a day. (Patient not taking: Reported on 3/6/2025), Disp: 180 tablet, Rfl: 5     Last Recorded Vitals  Blood pressure 119/71, pulse 80, temperature 36.8 °C (98.3 °F), temperature source Temporal, resp. rate 18, weight 77.1 kg (170 lb), SpO2 95%.    Physical Exam  General: chronically ill appearing, well-nourished, no acute distress, baggy clothes  HEENT: PERRLA, EOM intact, no scleral icterus, moist MM  Respiratory: decreased R breath sounds, normal work of breathing  Cardiovascular: RRR,  no murmurs/rubs/gallops  Abdomen: Soft, nontender, nondistended  Extremities: no edema, no asterixis  Neuro: alert and oriented, CNII-XII grossly intact, moves all 4 extremities with no focal deficits      Relevant Results    Current Outpatient Medications:     allopurinol (Zyloprim) 100 mg tablet, Take 1 tablet (100 mg) by mouth once daily., Disp: , Rfl:     amLODIPine (Norvasc) 10 mg tablet, Take 1 tablet (10 mg) by mouth once daily., Disp: 30 tablet, Rfl: 1    carvedilol (Coreg) 25 mg tablet, Take 1 tablet (25 mg) by mouth every 12 hours., Disp: 60 tablet, Rfl: 10    fluticasone (Flonase) 50 mcg/actuation nasal spray, , Disp: , Rfl:     hydrALAZINE (Apresoline) 100 mg tablet, Take 1 tablet (100 mg) by mouth 2 times a day., Disp: 60 tablet, Rfl: 10    loperamide (Imodium) 2 mg capsule, Take 1 capsule (2 mg) by mouth 4 times a day as needed for diarrhea., Disp: 90 capsule, Rfl: 3    acetaminophen (Tylenol) 325 mg tablet, Take by mouth every 4 hours if needed. (Patient not taking: Reported on 3/6/2025), Disp: , Rfl:     atorvastatin (Lipitor) 80 mg tablet, Take 1 tablet (80 mg) by mouth once daily at bedtime. (Patient not taking: Reported on 3/6/2025), Disp: , Rfl:     calcium acetate (Phoslo) 667 mg capsule, Take 3 capsules (2,000 mg) by mouth 3 times daily (morning, midday, late afternoon). (Patient not taking: Reported on 3/6/2025), Disp: 270 capsule, Rfl: 5    cholecalciferol (Vitamin D-3) 50 mcg (2,000 unit) capsule, Take 1 capsule (50 mcg) by mouth early in the morning.. (Patient not taking: Reported on 3/6/2025), Disp: 60 capsule, Rfl: 4    ferrous sulfate, 325 mg ferrous sulfate, tablet, Take 1 tablet (325 mg) by mouth 2 times a day. (Patient not taking: Reported on 3/6/2025), Disp: 60 tablet, Rfl: 8    levETIRAcetam (Keppra) 500 mg tablet, Take 1 tablet (500 mg) by mouth once daily. And one additional tablet after dialysis, Disp: 30 tablet, Rfl: 1    losartan (Cozaar) 25 mg tablet, Take 1 tablet (25 mg) by  mouth once daily., Disp: 30 tablet, Rfl: 1    sodium bicarbonate 650 mg tablet, Take 2 tablets (1,300 mg) by mouth 2 times a day. (Patient not taking: Reported on 3/6/2025), Disp: 180 tablet, Rfl: 5     Lab Results   Component Value Date    WBC 5.2 2025    HGB 9.7 (L) 2025    HCT 32.9 (L) 2025    MCV 90 2025     2025     Lab Results   Component Value Date    GLUCOSE 93 2024    CALCIUM 7.6 (L) 2024     2024    K 3.5 2024    CO2 32 2024     2024    BUN 26 (H) 2024    CREATININE 8.38 (H) 2024     Lab Results   Component Value Date    ALT 7 (L) 2024    AST 8 (L) 2024    ALKPHOS 58 2024    BILITOT 0.3 2024         Imagin2025 CT abdomen pelvis  IMPRESSION:  1. Interval development of moderate sized right pleural effusion with  pleural thickening, pleural calcifications, and significant  associated volume loss of the right lung, compared to the partially  visualized lower chest on 2023 CT. Findings are nonspecific,  for correlation with history of pleurodesis, pleuritis, or empyema in  the interval. Otherwise, underlying pleural or pulmonary neoplasm can  not be excluded.  2. Interval development/worsening of cardiomegaly compared to  partially visualized lower chest on 2023 CT with findings  suggestive of volume overload including diffuse abdominal wall edema  and mesenteric haziness. Interval increase in size of the spleen and  liver compared to  CT which can be sequela of the cardiac  dysfunction, for correlation with liver function tests. However an  underlying lymphoproliferative disorder in the current clinical  setting can not be entirely excluded.  3. Atrophic bilateral native kidneys without evidence of  nephrolithiasis or hydronephrosis.  4. Severe diverticulosis of the colon without evidence of acute  diverticulitis or bowel obstruction.  5. Additional chronic  findings as described above.    GI Procedures:     2/28/2025 EGD  Impression  Grade A esophagitis in the lower third of the esophagus  Mild erythematous, granular mucosa in the body of the stomach and antrum, consistent with gastritis; performed cold forceps biopsy to rule out H. pylori  Otherwise normal upper endoscopy.        Findings  Regular Z-line 40 cm from the incisors  Grade A esophagitis with mucosal breaks measuring less than 5 mm not continuous between folds, covering less than 75% of the circumference in the lower third of the esophagus  Mild, generalized erythematous and granular mucosa in the body of the stomach and antrum, consistent with gastritis; performed cold forceps biopsy to rule out H. pylori. Placed in Jar 1  All observed locations otherwise appeared normal.      2/28/2025 Colonoscopy  Impression  Scattered diverticulosis of severe severity  Small (grade 1) hemorrhoids  Otherwise normal ileocolonoscopy.        Findings  Multiple medium and large, scattered pancolonic severe diverticula with no inflammation containing no content; no bleeding was observed  Internal small (grade 1) hemorrhoids  All observed locations otherwise appeared normal.      Component    FINAL DIAGNOSIS   Stomach, biopsy:  - Mild chronic nonspecific gastritis, no Helicobacter identified.       ASSESSMENT/PLAN:  Soren Howard is a 49 y.o. male with a past medical history of ESRD sec to HTN, on HD since 3/2022 (Dr. Charles, [facility], [facility], [facility]), h/o recurrent bleeding colonic AVMs s/p APC (and nonbleeding gastric AVMs), h/o seizures 2023, depression, prior LA grade C esophagitis now off PPI, and recurrent anemia who presents to GI clinic for acute on chronic anemia, diarrhea, and unintentional weight loss    We performed EGD/colonoscopy that did not show any malignancy. CT chest AP as part of his weight loss work up and found concerning R chest findings as above. Plan to refer to pulmonary for this, likely needs  diagnostic thoracentesis with cytology. Concern for lung malignancy    Regarding his diarrhea, symptomatically it is improved with loperamide. We performed colonoscopy which was visually normal but we do not have random colon biopsies. Still do not have clear etiology for his diarrhea so will plan for stool studies as below    Plan:  -Send fecal calprotectin, fecal elastase, stool O&P, giardia/cryptosporidum, stool electrolytes  -Take loperamide 2mg two capsules BID  -Start Omeprazole 20mg once daily  -Refer to Pulmonology given CT chest findings as above  -PCP referral ordered  -RTC 3-4 months to see me    I spent 50 minutes in the professional and overall care of this patient.      Pallavi Finch MD    Patient was seen and discussed with Dr. Moreira

## 2025-03-06 ENCOUNTER — OFFICE VISIT (OUTPATIENT)
Dept: GASTROENTEROLOGY | Facility: HOSPITAL | Age: 50
End: 2025-03-06
Payer: COMMERCIAL

## 2025-03-06 VITALS
DIASTOLIC BLOOD PRESSURE: 71 MMHG | BODY MASS INDEX: 26.63 KG/M2 | SYSTOLIC BLOOD PRESSURE: 119 MMHG | RESPIRATION RATE: 18 BRPM | HEART RATE: 80 BPM | TEMPERATURE: 98.3 F | WEIGHT: 170 LBS | OXYGEN SATURATION: 95 %

## 2025-03-06 DIAGNOSIS — R19.7 DIARRHEA, UNSPECIFIED TYPE: Primary | ICD-10-CM

## 2025-03-06 DIAGNOSIS — K21.00 GASTROESOPHAGEAL REFLUX DISEASE WITH ESOPHAGITIS WITHOUT HEMORRHAGE: ICD-10-CM

## 2025-03-06 DIAGNOSIS — N18.6 ESRD (END STAGE RENAL DISEASE) (MULTI): ICD-10-CM

## 2025-03-06 RX ORDER — FLUTICASONE PROPIONATE 50 MCG
SPRAY, SUSPENSION (ML) NASAL
COMMUNITY
Start: 2025-02-12

## 2025-03-06 RX ORDER — OMEPRAZOLE 20 MG/1
20 CAPSULE, DELAYED RELEASE ORAL
Qty: 30 CAPSULE | Refills: 11 | Status: SHIPPED | OUTPATIENT
Start: 2025-03-06 | End: 2026-03-06

## 2025-03-06 ASSESSMENT — PAIN SCALES - GENERAL: PAINLEVEL_OUTOF10: 0-NO PAIN

## 2025-03-06 NOTE — PATIENT INSTRUCTIONS
Thank you for coming to [facility]  Please stop by an [facility] to  a stool collection kit. Bring the kit home to collect your stool sample then drop the sample back off at the lab  Take your loperamide 2 tablets/capsules twice a day (instead of 4 capsules at once)  Schedule an appointment with the Pulmonary (Lung) doctors  I have referred you to a primary care physician as well  Come back to clinic in 3-4 months to follow up

## 2025-04-15 DIAGNOSIS — I12.9 HYPERTENSIVE CHRONIC KIDNEY DISEASE WITH STAGE 1 THROUGH STAGE 4 CHRONIC KIDNEY DISEASE, OR UNSPECIFIED CHRONIC KIDNEY DISEASE: ICD-10-CM

## 2025-04-15 RX ORDER — CARVEDILOL 25 MG/1
25 TABLET ORAL EVERY 12 HOURS
Qty: 60 TABLET | Refills: 7 | Status: SHIPPED | OUTPATIENT
Start: 2025-04-15

## 2025-04-15 RX ORDER — AMLODIPINE BESYLATE 10 MG/1
10 TABLET ORAL DAILY
Qty: 30 TABLET | Refills: 7 | Status: SHIPPED | OUTPATIENT
Start: 2025-04-15 | End: 2025-12-11

## 2025-04-15 RX ORDER — LOSARTAN POTASSIUM 25 MG/1
25 TABLET ORAL DAILY
Qty: 30 TABLET | Refills: 7 | Status: SHIPPED | OUTPATIENT
Start: 2025-04-15 | End: 2025-12-11

## 2025-04-28 ENCOUNTER — APPOINTMENT (OUTPATIENT)
Dept: RADIOLOGY | Facility: HOSPITAL | Age: 50
End: 2025-04-28
Payer: COMMERCIAL

## 2025-04-28 ENCOUNTER — APPOINTMENT (OUTPATIENT)
Dept: CARDIOLOGY | Facility: HOSPITAL | Age: 50
End: 2025-04-28
Payer: COMMERCIAL

## 2025-04-28 ENCOUNTER — HOSPITAL ENCOUNTER (INPATIENT)
Facility: HOSPITAL | Age: 50
LOS: 1 days | Discharge: LEFT BEFORE DISCHARGE | End: 2025-04-28
Attending: EMERGENCY MEDICINE | Admitting: INTERNAL MEDICINE
Payer: COMMERCIAL

## 2025-04-28 ENCOUNTER — APPOINTMENT (OUTPATIENT)
Dept: DIALYSIS | Facility: HOSPITAL | Age: 50
End: 2025-04-28
Payer: COMMERCIAL

## 2025-04-28 VITALS
TEMPERATURE: 98.1 F | DIASTOLIC BLOOD PRESSURE: 101 MMHG | BODY MASS INDEX: 26.68 KG/M2 | RESPIRATION RATE: 16 BRPM | HEART RATE: 88 BPM | OXYGEN SATURATION: 100 % | WEIGHT: 170 LBS | HEIGHT: 67 IN | SYSTOLIC BLOOD PRESSURE: 165 MMHG

## 2025-04-28 DIAGNOSIS — D50.9 IRON DEFICIENCY ANEMIA, UNSPECIFIED IRON DEFICIENCY ANEMIA TYPE: ICD-10-CM

## 2025-04-28 DIAGNOSIS — K92.1 GASTROINTESTINAL HEMORRHAGE WITH MELENA: Primary | ICD-10-CM

## 2025-04-28 LAB
ABO GROUP (TYPE) IN BLOOD: NORMAL
ALBUMIN SERPL BCP-MCNC: 3.2 G/DL (ref 3.4–5)
ALP SERPL-CCNC: 42 U/L (ref 33–120)
ALT SERPL W P-5'-P-CCNC: 6 U/L (ref 10–52)
ANION GAP SERPL CALC-SCNC: 13 MMOL/L (ref 10–20)
ANION GAP SERPL CALC-SCNC: 19 MMOL/L (ref 10–20)
ANTIBODY SCREEN: NORMAL
AST SERPL W P-5'-P-CCNC: 9 U/L (ref 9–39)
ATRIAL RATE: 73 BPM
BASOPHILS # BLD AUTO: 0.04 X10*3/UL (ref 0–0.1)
BASOPHILS NFR BLD AUTO: 0.5 %
BILIRUB DIRECT SERPL-MCNC: 0.1 MG/DL (ref 0–0.3)
BILIRUB SERPL-MCNC: 0.3 MG/DL (ref 0–1.2)
BLOOD EXPIRATION DATE: NORMAL
BLOOD EXPIRATION DATE: NORMAL
BUN SERPL-MCNC: 110 MG/DL (ref 6–23)
BUN SERPL-MCNC: 48 MG/DL (ref 6–23)
CALCIUM SERPL-MCNC: 7.1 MG/DL (ref 8.6–10.3)
CALCIUM SERPL-MCNC: 7.9 MG/DL (ref 8.6–10.3)
CHLORIDE SERPL-SCNC: 103 MMOL/L (ref 98–107)
CHLORIDE SERPL-SCNC: 104 MMOL/L (ref 98–107)
CO2 SERPL-SCNC: 21 MMOL/L (ref 21–32)
CO2 SERPL-SCNC: 26 MMOL/L (ref 21–32)
CREAT SERPL-MCNC: 18.91 MG/DL (ref 0.5–1.3)
CREAT SERPL-MCNC: 8.21 MG/DL (ref 0.5–1.3)
DISPENSE STATUS: NORMAL
DISPENSE STATUS: NORMAL
EGFRCR SERPLBLD CKD-EPI 2021: 3 ML/MIN/1.73M*2
EGFRCR SERPLBLD CKD-EPI 2021: 7 ML/MIN/1.73M*2
EOSINOPHIL # BLD AUTO: 0.45 X10*3/UL (ref 0–0.7)
EOSINOPHIL NFR BLD AUTO: 6 %
ERYTHROCYTE [DISTWIDTH] IN BLOOD BY AUTOMATED COUNT: 18 % (ref 11.5–14.5)
ERYTHROCYTE [DISTWIDTH] IN BLOOD BY AUTOMATED COUNT: 19.1 % (ref 11.5–14.5)
GLUCOSE SERPL-MCNC: 107 MG/DL (ref 74–99)
GLUCOSE SERPL-MCNC: 98 MG/DL (ref 74–99)
HCT VFR BLD AUTO: 19.5 % (ref 41–52)
HCT VFR BLD AUTO: 23.7 % (ref 41–52)
HEMOCCULT SP1 STL QL: POSITIVE
HGB BLD-MCNC: 6.2 G/DL (ref 13.5–17.5)
HGB BLD-MCNC: 7.8 G/DL (ref 13.5–17.5)
IMM GRANULOCYTES # BLD AUTO: 0.06 X10*3/UL (ref 0–0.7)
IMM GRANULOCYTES NFR BLD AUTO: 0.8 % (ref 0–0.9)
INR PPP: 1.1 (ref 0.9–1.1)
IRON SATN MFR SERPL: 53 % (ref 25–45)
IRON SERPL-MCNC: 90 UG/DL (ref 35–150)
LACTATE SERPL-SCNC: 0.7 MMOL/L (ref 0.4–2)
LDH SERPL L TO P-CCNC: 132 U/L (ref 84–246)
LYMPHOCYTES # BLD AUTO: 0.85 X10*3/UL (ref 1.2–4.8)
LYMPHOCYTES NFR BLD AUTO: 11.3 %
MCH RBC QN AUTO: 28.3 PG (ref 26–34)
MCH RBC QN AUTO: 29.2 PG (ref 26–34)
MCHC RBC AUTO-ENTMCNC: 31.8 G/DL (ref 32–36)
MCHC RBC AUTO-ENTMCNC: 32.9 G/DL (ref 32–36)
MCV RBC AUTO: 86 FL (ref 80–100)
MCV RBC AUTO: 92 FL (ref 80–100)
MONOCYTES # BLD AUTO: 0.64 X10*3/UL (ref 0.1–1)
MONOCYTES NFR BLD AUTO: 8.5 %
NEUTROPHILS # BLD AUTO: 5.5 X10*3/UL (ref 1.2–7.7)
NEUTROPHILS NFR BLD AUTO: 72.9 %
NRBC BLD-RTO: 0 /100 WBCS (ref 0–0)
NRBC BLD-RTO: 0 /100 WBCS (ref 0–0)
P AXIS: 38 DEGREES
P OFFSET: 154 MS
P ONSET: 98 MS
PLATELET # BLD AUTO: 179 X10*3/UL (ref 150–450)
PLATELET # BLD AUTO: 216 X10*3/UL (ref 150–450)
POTASSIUM SERPL-SCNC: 3.3 MMOL/L (ref 3.5–5.3)
POTASSIUM SERPL-SCNC: 5.5 MMOL/L (ref 3.5–5.3)
PR INTERVAL: 238 MS
PRODUCT BLOOD TYPE: 5100
PRODUCT BLOOD TYPE: NORMAL
PRODUCT CODE: NORMAL
PRODUCT CODE: NORMAL
PROT SERPL-MCNC: 6 G/DL (ref 6.4–8.2)
PROT SERPL-MCNC: 6 G/DL (ref 6.4–8.2)
PROTHROMBIN TIME: 12.1 SECONDS (ref 9.8–12.4)
Q ONSET: 217 MS
QRS COUNT: 12 BEATS
QRS DURATION: 102 MS
QT INTERVAL: 440 MS
QTC CALCULATION(BAZETT): 484 MS
QTC FREDERICIA: 470 MS
R AXIS: 12 DEGREES
RBC # BLD AUTO: 2.12 X10*6/UL (ref 4.5–5.9)
RBC # BLD AUTO: 2.76 X10*6/UL (ref 4.5–5.9)
RH FACTOR (ANTIGEN D): NORMAL
SODIUM SERPL-SCNC: 138 MMOL/L (ref 136–145)
SODIUM SERPL-SCNC: 139 MMOL/L (ref 136–145)
T AXIS: 237 DEGREES
T OFFSET: 437 MS
TIBC SERPL-MCNC: 170 UG/DL (ref 240–445)
UIBC SERPL-MCNC: 80 UG/DL (ref 110–370)
UNIT ABO: NORMAL
UNIT ABO: NORMAL
UNIT NUMBER: NORMAL
UNIT NUMBER: NORMAL
UNIT RH: NORMAL
UNIT RH: NORMAL
UNIT VOLUME: 275
UNIT VOLUME: 289
VENTRICULAR RATE: 73 BPM
WBC # BLD AUTO: 6.5 X10*3/UL (ref 4.4–11.3)
WBC # BLD AUTO: 7.5 X10*3/UL (ref 4.4–11.3)
XM INTEP: NORMAL
XM INTEP: NORMAL

## 2025-04-28 PROCEDURE — 82270 OCCULT BLOOD FECES: CPT | Performed by: EMERGENCY MEDICINE

## 2025-04-28 PROCEDURE — 85610 PROTHROMBIN TIME: CPT | Performed by: EMERGENCY MEDICINE

## 2025-04-28 PROCEDURE — 99291 CRITICAL CARE FIRST HOUR: CPT | Performed by: EMERGENCY MEDICINE

## 2025-04-28 PROCEDURE — 5A1D70Z PERFORMANCE OF URINARY FILTRATION, INTERMITTENT, LESS THAN 6 HOURS PER DAY: ICD-10-PCS | Performed by: INTERNAL MEDICINE

## 2025-04-28 PROCEDURE — 83550 IRON BINDING TEST: CPT | Performed by: EMERGENCY MEDICINE

## 2025-04-28 PROCEDURE — 83605 ASSAY OF LACTIC ACID: CPT | Performed by: EMERGENCY MEDICINE

## 2025-04-28 PROCEDURE — 80053 COMPREHEN METABOLIC PANEL: CPT | Performed by: EMERGENCY MEDICINE

## 2025-04-28 PROCEDURE — 36415 COLL VENOUS BLD VENIPUNCTURE: CPT | Performed by: EMERGENCY MEDICINE

## 2025-04-28 PROCEDURE — 1200000002 HC GENERAL ROOM WITH TELEMETRY DAILY

## 2025-04-28 PROCEDURE — 71045 X-RAY EXAM CHEST 1 VIEW: CPT | Performed by: RADIOLOGY

## 2025-04-28 PROCEDURE — 86850 RBC ANTIBODY SCREEN: CPT | Performed by: EMERGENCY MEDICINE

## 2025-04-28 PROCEDURE — G0378 HOSPITAL OBSERVATION PER HR: HCPCS

## 2025-04-28 PROCEDURE — 85025 COMPLETE CBC W/AUTO DIFF WBC: CPT | Performed by: EMERGENCY MEDICINE

## 2025-04-28 PROCEDURE — 8010000001 HC DIALYSIS - HEMODIALYSIS PER DAY

## 2025-04-28 PROCEDURE — 80048 BASIC METABOLIC PNL TOTAL CA: CPT | Mod: CCI | Performed by: INTERNAL MEDICINE

## 2025-04-28 PROCEDURE — 82248 BILIRUBIN DIRECT: CPT | Performed by: EMERGENCY MEDICINE

## 2025-04-28 PROCEDURE — 71045 X-RAY EXAM CHEST 1 VIEW: CPT

## 2025-04-28 PROCEDURE — 99254 IP/OBS CNSLTJ NEW/EST MOD 60: CPT | Performed by: INTERNAL MEDICINE

## 2025-04-28 PROCEDURE — 85027 COMPLETE CBC AUTOMATED: CPT | Performed by: INTERNAL MEDICINE

## 2025-04-28 PROCEDURE — P9016 RBC LEUKOCYTES REDUCED: HCPCS

## 2025-04-28 PROCEDURE — 36415 COLL VENOUS BLD VENIPUNCTURE: CPT | Performed by: INTERNAL MEDICINE

## 2025-04-28 PROCEDURE — 93005 ELECTROCARDIOGRAM TRACING: CPT

## 2025-04-28 PROCEDURE — 83615 LACTATE (LD) (LDH) ENZYME: CPT | Performed by: INTERNAL MEDICINE

## 2025-04-28 PROCEDURE — 84155 ASSAY OF PROTEIN SERUM: CPT | Performed by: INTERNAL MEDICINE

## 2025-04-28 PROCEDURE — 86901 BLOOD TYPING SEROLOGIC RH(D): CPT | Performed by: EMERGENCY MEDICINE

## 2025-04-28 PROCEDURE — 83540 ASSAY OF IRON: CPT | Performed by: EMERGENCY MEDICINE

## 2025-04-28 PROCEDURE — 90937 HEMODIALYSIS REPEATED EVAL: CPT

## 2025-04-28 PROCEDURE — 36430 TRANSFUSION BLD/BLD COMPNT: CPT

## 2025-04-28 PROCEDURE — 99222 1ST HOSP IP/OBS MODERATE 55: CPT | Performed by: INTERNAL MEDICINE

## 2025-04-28 PROCEDURE — 86923 COMPATIBILITY TEST ELECTRIC: CPT

## 2025-04-28 RX ORDER — ACETAMINOPHEN 650 MG/1
650 SUPPOSITORY RECTAL EVERY 4 HOURS PRN
Status: DISCONTINUED | OUTPATIENT
Start: 2025-04-28 | End: 2025-04-28 | Stop reason: HOSPADM

## 2025-04-28 RX ORDER — TALC
3 POWDER (GRAM) TOPICAL NIGHTLY PRN
Status: DISCONTINUED | OUTPATIENT
Start: 2025-04-28 | End: 2025-04-28 | Stop reason: HOSPADM

## 2025-04-28 RX ORDER — PANTOPRAZOLE SODIUM 40 MG/1
40 TABLET, DELAYED RELEASE ORAL
Status: DISCONTINUED | OUTPATIENT
Start: 2025-04-29 | End: 2025-04-28 | Stop reason: HOSPADM

## 2025-04-28 RX ORDER — CARVEDILOL 25 MG/1
25 TABLET ORAL
Status: DISCONTINUED | OUTPATIENT
Start: 2025-04-28 | End: 2025-04-28 | Stop reason: HOSPADM

## 2025-04-28 RX ORDER — PANTOPRAZOLE SODIUM 40 MG/10ML
40 INJECTION, POWDER, LYOPHILIZED, FOR SOLUTION INTRAVENOUS
Status: DISCONTINUED | OUTPATIENT
Start: 2025-04-29 | End: 2025-04-28 | Stop reason: HOSPADM

## 2025-04-28 RX ORDER — HYDRALAZINE HYDROCHLORIDE 50 MG/1
100 TABLET, FILM COATED ORAL 2 TIMES DAILY
Status: DISCONTINUED | OUTPATIENT
Start: 2025-04-28 | End: 2025-04-28 | Stop reason: HOSPADM

## 2025-04-28 RX ORDER — LEVETIRACETAM 500 MG/1
500 TABLET ORAL DAILY
Status: DISCONTINUED | OUTPATIENT
Start: 2025-04-28 | End: 2025-04-28 | Stop reason: HOSPADM

## 2025-04-28 RX ORDER — POLYETHYLENE GLYCOL 3350 17 G/17G
17 POWDER, FOR SOLUTION ORAL DAILY PRN
Status: DISCONTINUED | OUTPATIENT
Start: 2025-04-28 | End: 2025-04-28 | Stop reason: HOSPADM

## 2025-04-28 RX ORDER — AMLODIPINE BESYLATE 10 MG/1
10 TABLET ORAL DAILY
Status: DISCONTINUED | OUTPATIENT
Start: 2025-04-28 | End: 2025-04-28 | Stop reason: HOSPADM

## 2025-04-28 RX ORDER — GUAIFENESIN 600 MG/1
600 TABLET, EXTENDED RELEASE ORAL EVERY 12 HOURS PRN
Status: DISCONTINUED | OUTPATIENT
Start: 2025-04-28 | End: 2025-04-28 | Stop reason: HOSPADM

## 2025-04-28 RX ORDER — ONDANSETRON HYDROCHLORIDE 2 MG/ML
4 INJECTION, SOLUTION INTRAVENOUS EVERY 8 HOURS PRN
Status: DISCONTINUED | OUTPATIENT
Start: 2025-04-28 | End: 2025-04-28 | Stop reason: HOSPADM

## 2025-04-28 RX ORDER — LOSARTAN POTASSIUM 25 MG/1
25 TABLET ORAL DAILY
Status: DISCONTINUED | OUTPATIENT
Start: 2025-04-29 | End: 2025-04-28 | Stop reason: HOSPADM

## 2025-04-28 RX ORDER — ACETAMINOPHEN 325 MG/1
650 TABLET ORAL EVERY 4 HOURS PRN
Status: DISCONTINUED | OUTPATIENT
Start: 2025-04-28 | End: 2025-04-28 | Stop reason: HOSPADM

## 2025-04-28 RX ORDER — ONDANSETRON 4 MG/1
4 TABLET, FILM COATED ORAL EVERY 8 HOURS PRN
Status: DISCONTINUED | OUTPATIENT
Start: 2025-04-28 | End: 2025-04-28 | Stop reason: HOSPADM

## 2025-04-28 RX ORDER — ACETAMINOPHEN 160 MG/5ML
650 SOLUTION ORAL EVERY 4 HOURS PRN
Status: DISCONTINUED | OUTPATIENT
Start: 2025-04-28 | End: 2025-04-28 | Stop reason: HOSPADM

## 2025-04-28 SDOH — SOCIAL STABILITY: SOCIAL INSECURITY: WITHIN THE LAST YEAR, HAVE YOU BEEN AFRAID OF YOUR PARTNER OR EX-PARTNER?: NO

## 2025-04-28 SDOH — HEALTH STABILITY: PHYSICAL HEALTH: ON AVERAGE, HOW MANY DAYS PER WEEK DO YOU ENGAGE IN MODERATE TO STRENUOUS EXERCISE (LIKE A BRISK WALK)?: 0 DAYS

## 2025-04-28 SDOH — SOCIAL STABILITY: SOCIAL NETWORK: IN A TYPICAL WEEK, HOW MANY TIMES DO YOU TALK ON THE PHONE WITH FAMILY, FRIENDS, OR NEIGHBORS?: NEVER

## 2025-04-28 SDOH — HEALTH STABILITY: PHYSICAL HEALTH
HOW OFTEN DO YOU NEED TO HAVE SOMEONE HELP YOU WHEN YOU READ INSTRUCTIONS, PAMPHLETS, OR OTHER WRITTEN MATERIAL FROM YOUR DOCTOR OR PHARMACY?: NEVER

## 2025-04-28 SDOH — SOCIAL STABILITY: SOCIAL INSECURITY: WITHIN THE LAST YEAR, HAVE YOU BEEN HUMILIATED OR EMOTIONALLY ABUSED IN OTHER WAYS BY YOUR PARTNER OR EX-PARTNER?: NO

## 2025-04-28 SDOH — ECONOMIC STABILITY: FOOD INSECURITY: WITHIN THE PAST 12 MONTHS, THE FOOD YOU BOUGHT JUST DIDN'T LAST AND YOU DIDN'T HAVE MONEY TO GET MORE.: NEVER TRUE

## 2025-04-28 SDOH — ECONOMIC STABILITY: HOUSING INSECURITY: IN THE PAST 12 MONTHS, HOW MANY TIMES HAVE YOU MOVED WHERE YOU WERE LIVING?: 0

## 2025-04-28 SDOH — SOCIAL STABILITY: SOCIAL INSECURITY
WITHIN THE LAST YEAR, HAVE YOU BEEN KICKED, HIT, SLAPPED, OR OTHERWISE PHYSICALLY HURT BY YOUR PARTNER OR EX-PARTNER?: NO

## 2025-04-28 SDOH — HEALTH STABILITY: MENTAL HEALTH
DO YOU FEEL STRESS - TENSE, RESTLESS, NERVOUS, OR ANXIOUS, OR UNABLE TO SLEEP AT NIGHT BECAUSE YOUR MIND IS TROUBLED ALL THE TIME - THESE DAYS?: NOT AT ALL

## 2025-04-28 SDOH — ECONOMIC STABILITY: HOUSING INSECURITY: AT ANY TIME IN THE PAST 12 MONTHS, WERE YOU HOMELESS OR LIVING IN A SHELTER (INCLUDING NOW)?: NO

## 2025-04-28 SDOH — HEALTH STABILITY: MENTAL HEALTH: HOW OFTEN DO YOU HAVE A DRINK CONTAINING ALCOHOL?: NEVER

## 2025-04-28 SDOH — SOCIAL STABILITY: SOCIAL INSECURITY: ARE YOU MARRIED, WIDOWED, DIVORCED, SEPARATED, NEVER MARRIED, OR LIVING WITH A PARTNER?: MARRIED

## 2025-04-28 SDOH — SOCIAL STABILITY: SOCIAL INSECURITY
WITHIN THE LAST YEAR, HAVE YOU BEEN RAPED OR FORCED TO HAVE ANY KIND OF SEXUAL ACTIVITY BY YOUR PARTNER OR EX-PARTNER?: NO

## 2025-04-28 SDOH — ECONOMIC STABILITY: TRANSPORTATION INSECURITY: IN THE PAST 12 MONTHS, HAS LACK OF TRANSPORTATION KEPT YOU FROM MEDICAL APPOINTMENTS OR FROM GETTING MEDICATIONS?: NO

## 2025-04-28 SDOH — ECONOMIC STABILITY: FOOD INSECURITY: WITHIN THE PAST 12 MONTHS, YOU WORRIED THAT YOUR FOOD WOULD RUN OUT BEFORE YOU GOT THE MONEY TO BUY MORE.: NEVER TRUE

## 2025-04-28 SDOH — HEALTH STABILITY: MENTAL HEALTH: HOW OFTEN DO YOU HAVE SIX OR MORE DRINKS ON ONE OCCASION?: NEVER

## 2025-04-28 SDOH — HEALTH STABILITY: PHYSICAL HEALTH: ON AVERAGE, HOW MANY MINUTES DO YOU ENGAGE IN EXERCISE AT THIS LEVEL?: 0 MIN

## 2025-04-28 SDOH — ECONOMIC STABILITY: HOUSING INSECURITY: IN THE LAST 12 MONTHS, WAS THERE A TIME WHEN YOU WERE NOT ABLE TO PAY THE MORTGAGE OR RENT ON TIME?: NO

## 2025-04-28 SDOH — SOCIAL STABILITY: SOCIAL NETWORK
DO YOU BELONG TO ANY CLUBS OR ORGANIZATIONS SUCH AS CHURCH GROUPS, UNIONS, FRATERNAL OR ATHLETIC GROUPS, OR SCHOOL GROUPS?: NO

## 2025-04-28 SDOH — HEALTH STABILITY: MENTAL HEALTH: HOW MANY DRINKS CONTAINING ALCOHOL DO YOU HAVE ON A TYPICAL DAY WHEN YOU ARE DRINKING?: PATIENT DOES NOT DRINK

## 2025-04-28 SDOH — ECONOMIC STABILITY: FOOD INSECURITY: HOW HARD IS IT FOR YOU TO PAY FOR THE VERY BASICS LIKE FOOD, HOUSING, MEDICAL CARE, AND HEATING?: NOT VERY HARD

## 2025-04-28 SDOH — SOCIAL STABILITY: SOCIAL NETWORK: HOW OFTEN DO YOU GET TOGETHER WITH FRIENDS OR RELATIVES?: NEVER

## 2025-04-28 SDOH — SOCIAL STABILITY: SOCIAL NETWORK: HOW OFTEN DO YOU ATTEND CHURCH OR RELIGIOUS SERVICES?: NEVER

## 2025-04-28 SDOH — ECONOMIC STABILITY: INCOME INSECURITY: IN THE PAST 12 MONTHS HAS THE ELECTRIC, GAS, OIL, OR WATER COMPANY THREATENED TO SHUT OFF SERVICES IN YOUR HOME?: NO

## 2025-04-28 SDOH — SOCIAL STABILITY: SOCIAL NETWORK: HOW OFTEN DO YOU ATTEND MEETINGS OF THE CLUBS OR ORGANIZATIONS YOU BELONG TO?: NEVER

## 2025-04-28 ASSESSMENT — PAIN - FUNCTIONAL ASSESSMENT
PAIN_FUNCTIONAL_ASSESSMENT: 0-10
PAIN_FUNCTIONAL_ASSESSMENT: NO/DENIES PAIN

## 2025-04-28 ASSESSMENT — PAIN SCALES - GENERAL
PAINLEVEL_OUTOF10: 0 - NO PAIN

## 2025-04-28 ASSESSMENT — LIFESTYLE VARIABLES
SKIP TO QUESTIONS 9-10: 1
AUDIT-C TOTAL SCORE: 0

## 2025-04-28 ASSESSMENT — ACTIVITIES OF DAILY LIVING (ADL): LACK_OF_TRANSPORTATION: NO

## 2025-04-28 NOTE — PROGRESS NOTES
Pharmacy Medication History    Spoke to the patient, there are a few medications pt no longer takes, just because he doesn't want to take them.  Still takes just the BP medications    Source of Information:     Additional concerns with the patient's PTA list.     Notified Provider via Haiku : No    The following updates were made to the Prior to Admission medication list:     Medications ADDED:     Medications CHANGED:    Medications REMOVED:   Allopurinol  Lipitor   Medications NOT TAKING:   Calc Ace  Iron  Keppra   Omeprazole    Allergy reviewed : Yes    Meds 2 Beds : No    Outpatient pharmacy confirmed and updated in chart : Yes    Pharmacy name: [facility]    The list below reflectives the updated PTA list. Please review each medication in order reconciliation for additional clarification and justification.    Prior to Admission Medications   Prescriptions Last Dose   acetaminophen (Tylenol) 325 mg tablet    Sig: Take by mouth every 4 hours if needed.   amLODIPine (Norvasc) 10 mg tablet 4/25/2025   Sig: Take 1 tablet (10 mg) by mouth once daily.             carvedilol (Coreg) 25 mg tablet 4/25/2025   Sig: Take 1 tablet (25 mg) by mouth every 12 hours.                    hydrALAZINE (Apresoline) 100 mg tablet 4/25/2025   Sig: Take 1 tablet (100 mg) by mouth 2 times a day.             losartan (Cozaar) 25 mg tablet 4/25/2025   Sig: Take 1 tablet (25 mg) by mouth once daily.                Facility-Administered Medications: None       The list below reflectives the updated allergy list. Please review each documented allergy for additional clarification and justification.    Allergies   Allergen Reactions    Piperacillin-Tazobactam-Nidia Hives          04/28/25 at 10:54 AM - Katherine Rod

## 2025-04-28 NOTE — CONSULTS
"CONSULT: NEPHROLOGY SERVICE    REASON FOR CONSULT: ESKD  Admit Date: 4/28/2025  9:05 AM       HPI: Patient is a 49 y.o. 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 4h/F180, EDW 75    Medical History[1]  Allergies: Piperacillin-tazobactam-dextrs     Surgical History[2]    Family History[3]    Social History  He reports that he has never smoked. He has never used smokeless tobacco. He reports that he does not currently use alcohol after a past usage of about 6.0 standard drinks of alcohol per week. He reports that he does not use drugs.    Review of Systems  As above     CURRENT HOSP MEDS:  Current Medications[4]     PHYSICAL EXAM:  /84 (BP Location: Left arm, Patient Position: Lying)   Pulse 87   Temp 36.3 °C (97.3 °F) (Temporal)   Resp 16   Ht 1.702 m (5' 7\")   Wt 77.1 kg (170 lb)   SpO2 99%   BMI 26.63 kg/m²     Intake/Output Summary (Last 24 hours) at 4/28/2025 1407  Last data filed at 4/28/2025 1325  Gross per 24 hour   Intake 600 ml   Output --   Net 600 ml     Gen: AAO, NAD  Ext: No edema   Access: LUE AVF  Neuro: moves 4 ext    LABS:   Results for orders placed or performed during the hospital encounter of 04/28/25 (from the past 24 hours)   ECG 12 lead   Result Value Ref Range    Ventricular Rate 73 BPM    Atrial Rate 73 BPM    NH Interval 238 ms    QRS Duration 102 ms    QT Interval 440 ms    QTC Calculation(Bazett) 484 ms    P Axis 38 degrees    R Axis 12 degrees    T Axis 237 degrees    QRS Count 12 beats    Q Onset 217 ms    P Onset 98 ms    P Offset 154 ms    T Offset 437 ms    QTC Fredericia 470 ms   CBC and Auto Differential   Result Value Ref Range    WBC 7.5 4.4 - 11.3 x10*3/uL    nRBC 0.0 0.0 - " 0.0 /100 WBCs    RBC 2.12 (L) 4.50 - 5.90 x10*6/uL    Hemoglobin 6.2 (LL) 13.5 - 17.5 g/dL    Hematocrit 19.5 (L) 41.0 - 52.0 %    MCV 92 80 - 100 fL    MCH 29.2 26.0 - 34.0 pg    MCHC 31.8 (L) 32.0 - 36.0 g/dL    RDW 19.1 (H) 11.5 - 14.5 %    Platelets 216 150 - 450 x10*3/uL    Neutrophils % 72.9 40.0 - 80.0 %    Immature Granulocytes %, Automated 0.8 0.0 - 0.9 %    Lymphocytes % 11.3 13.0 - 44.0 %    Monocytes % 8.5 2.0 - 10.0 %    Eosinophils % 6.0 0.0 - 6.0 %    Basophils % 0.5 0.0 - 2.0 %    Neutrophils Absolute 5.50 1.20 - 7.70 x10*3/uL    Immature Granulocytes Absolute, Automated 0.06 0.00 - 0.70 x10*3/uL    Lymphocytes Absolute 0.85 (L) 1.20 - 4.80 x10*3/uL    Monocytes Absolute 0.64 0.10 - 1.00 x10*3/uL    Eosinophils Absolute 0.45 0.00 - 0.70 x10*3/uL    Basophils Absolute 0.04 0.00 - 0.10 x10*3/uL   Basic metabolic panel   Result Value Ref Range    Glucose 107 (H) 74 - 99 mg/dL    Sodium 138 136 - 145 mmol/L    Potassium 5.5 (H) 3.5 - 5.3 mmol/L    Chloride 104 98 - 107 mmol/L    Bicarbonate 21 21 - 32 mmol/L    Anion Gap 19 10 - 20 mmol/L    Urea Nitrogen 110 (HH) 6 - 23 mg/dL    Creatinine 18.91 (H) 0.50 - 1.30 mg/dL    eGFR 3 (L) >60 mL/min/1.73m*2    Calcium 7.1 (L) 8.6 - 10.3 mg/dL   Hepatic function panel   Result Value Ref Range    Albumin 3.2 (L) 3.4 - 5.0 g/dL    Bilirubin, Total 0.3 0.0 - 1.2 mg/dL    Bilirubin, Direct 0.1 0.0 - 0.3 mg/dL    Alkaline Phosphatase 42 33 - 120 U/L    ALT 6 (L) 10 - 52 U/L    AST 9 9 - 39 U/L    Total Protein 6.0 (L) 6.4 - 8.2 g/dL   Lactate   Result Value Ref Range    Lactate 0.7 0.4 - 2.0 mmol/L   Protime-INR   Result Value Ref Range    Protime 12.1 9.8 - 12.4 seconds    INR 1.1 0.9 - 1.1   Type And Screen   Result Value Ref Range    ABO TYPE O     Rh TYPE POS     ANTIBODY SCREEN NEG    Iron and TIBC   Result Value Ref Range    Iron 90 35 - 150 ug/dL    UIBC 80 (L) 110 - 370 ug/dL    TIBC 170 (L) 240 - 445 ug/dL    % Saturation 53 (H) 25 - 45 %   Occult Blood,  Stool    Specimen: Stool   Result Value Ref Range    Occult Blood, Stool X1 Positive (A) Negative   Prepare RBC: 2 Units   Result Value Ref Range    PRODUCT CODE S8835J78     Unit Number [id number]     Unit ABO O     Unit RH POS     XM INTEP COMP     Dispense Status TR     Blood Expiration Date 5/23/2025 11:59:00 PM EDT     PRODUCT BLOOD TYPE 5100     UNIT VOLUME 289     PRODUCT CODE Q4925Z09     Unit Number [id number]     Unit ABO O     Unit RH POS     XM INTEP COMP     Dispense Status XM     Blood Expiration Date 5/25/2025 11:59:00 PM EDT     PRODUCT BLOOD TYPE      UNIT VOLUME 275        DATA:   Diagnostic tests reviewed for today's visit:    Labs and meds    ASSESSMENT AND PLAN:  - ESKD on HD: apparent acceptable volume status   Missed last HD, chronically under dialyzed  Will plan for HD today  HTN: controlled  Mild hyperkalemia, using 2K bath  Hb 6, getting blood transfusion, GI bleed, acute, Hb was 12 one month ago    Abel Wiseman MD  Division of Nephrology and Hypertension      Greatly appreciate the opportunity to assist in the care of this patient. Will continue to follow.     Signature: Abel Wiseman MD  Division of Nephrology and Hypertension        [1]   Past Medical History:  Diagnosis Date    Angiodysplasia of stomach and duodenum without bleeding     Gastric AV malformation    COVID-19     COVID-19 virus infection    Diverticulosis of intestine, part unspecified, without perforation or abscess without bleeding     Diverticulosis    End stage renal disease (Multi) 08/02/2021    ESRD (end stage renal disease)    Other hemorrhoids     Internal hemorrhoid    Personal history of diseases of the blood and blood-forming organs and certain disorders involving the immune mechanism     History of anemia    Personal history of other diseases of the circulatory system     History of hypertension    Personal history of other diseases of the musculoskeletal system and connective tissue     History of  gout    Personal history of other endocrine, nutritional and metabolic disease     History of obesity    Personal history of other medical treatment     History of blood transfusion    Residual hemorrhoidal skin tags     External hemorrhoid    Smoker     Ulcer of anus and rectum     Rectal ulcer    Unspecified osteoarthritis, unspecified site     Osteoarthritis   [2]   Past Surgical History:  Procedure Laterality Date    CT ABDOMEN PELVIS ANGIOGRAM W AND/OR WO IV CONTRAST  5/24/2023    CT ABDOMEN PELVIS ANGIOGRAM W AND/OR WO IV CONTRAST 5/24/2023 [facility]    IR VENOGRAM DIALYSIS  5/26/2023    IR VENOGRAM DIALYSIS 5/26/2023 [facility]    OTHER SURGICAL HISTORY  01/18/2022    Dialysis tunneled catheter placement    OTHER SURGICAL HISTORY  01/19/2022    Arteriovenous fistula creation procedure   [3] No family history on file.  [4] No current facility-administered medications for this encounter.    Current Outpatient Medications:     acetaminophen (Tylenol) 325 mg tablet, Take by mouth every 4 hours if needed., Disp: , Rfl:     amLODIPine (Norvasc) 10 mg tablet, Take 1 tablet (10 mg) by mouth once daily., Disp: 30 tablet, Rfl: 7    calcium acetate (Phoslo) 667 mg capsule, Take 3 capsules (2,000 mg) by mouth 3 times daily (morning, midday, late afternoon). (Patient not taking: Reported on 4/28/2025), Disp: 270 capsule, Rfl: 5    carvedilol (Coreg) 25 mg tablet, Take 1 tablet (25 mg) by mouth every 12 hours., Disp: 60 tablet, Rfl: 7    cholecalciferol (Vitamin D-3) 50 mcg (2,000 unit) capsule, Take 1 capsule (50 mcg) by mouth early in the morning.. (Patient not taking: Reported on 4/28/2025), Disp: 60 capsule, Rfl: 4    ferrous sulfate, 325 mg ferrous sulfate, tablet, Take 1 tablet (325 mg) by mouth 2 times a day. (Patient not taking: Reported on 4/28/2025), Disp: 60 tablet, Rfl: 8    hydrALAZINE (Apresoline) 100 mg tablet, Take 1 tablet (100 mg) by mouth 2 times a day., Disp: 60 tablet, Rfl: 10    levETIRAcetam (Keppra) 500  mg tablet, Take 1 tablet (500 mg) by mouth once daily. And one additional tablet after dialysis (Patient not taking: Reported on 4/28/2025), Disp: 30 tablet, Rfl: 1    losartan (Cozaar) 25 mg tablet, Take 1 tablet (25 mg) by mouth once daily., Disp: 30 tablet, Rfl: 7    omeprazole (PriLOSEC) 20 mg DR capsule, Take 1 capsule (20 mg) by mouth once daily in the morning. Take before meals. Do not crush or chew. (Patient not taking: Reported on 4/28/2025), Disp: 30 capsule, Rfl: 11     Kiley Gonsalves

## 2025-04-28 NOTE — SIGNIFICANT EVENT
Order received for right thoracentesis for large pleural effusion.   Patient refuses at this time. He would like to have dialysis first.   We will reassess tomorrow.

## 2025-04-28 NOTE — PRE-PROCEDURE NOTE
Report from Sending RN:    Report From: Rayan Vann RN Via phone  Recent Surgery of Procedure: No  Baseline Level of Consciousness (LOC): A&Ox4  Oxygen Use: No  Type: RA  Diabetic: No  Last BP Med Given Day of Dialysis: See MAR  Last Pain Med Given: See MAR  Lab Tests to be Obtained with Dialysis: No  Blood Transfusion to be Given During Dialysis: Yes  Available IV Access: Yes  Medications to be Administered During Dialysis: No  Continuous IV Infusion Running: No  Restraints on Currently or in the Last 24 Hours: No  Hand-Off Communication: Full code     [id number]

## 2025-04-28 NOTE — ED PROVIDER NOTES
HPI   Chief Complaint   Patient presents with    Weakness, Gen       HPI: []  49-year-old  male with history of hypertension, ESRD hemodialysis, recurrent GI bleed, noncompliant, she goes for dialysis Monday Wednesday Friday last dialysis on Friday due today comes in with generalized weakness and melena.  He states over the last few days he has had blood in the stools and melanotic stools.  No abdominal pain.  No fever or chills.  Feels weak.  No chest pain pressure heaviness.  No cough or trouble breathing.  No syncope or near syncope.  No hematemesis.  He had EGD and colonoscopy done recently back in February which showed diverticulosis.    Past history: Hypertension, PVD, seizure disorder, GI bleed, noncompliant, ESRD  Social: Pat denies current tobacco alcohol drug abuse.    REVIEW OF SYSTEMS:    GENERAL.: No weight loss, positive for fatigue, anorexia, insomnia, fever.    EYES: No vision loss, double vision, drainage, eye pain.    ENT: No pharyngitis, dry mouth.    CARDIOPULMONARY: No chest pain, palpitations, syncope, near syncope. No shortness of breath, cough, hemoptysis.    GI: No abdominal pain, change in bowel habits, positive for melena, hematemesis, hematochezia, nausea, vomiting, diarrhea.    : No discharge, dysuria, frequency, urgency, hematuria.    MS: No limb pain, joint pain, joint swelling.    SKIN: No rashes.    PSYCH: No depression, anxiety, suicidality, homicidality.    Review of systems is otherwise negative unless stated above or in history of present illness.  Social history, family history, allergies reviewed.  PHYSICAL EXAM:    GENERAL: Vitals noted, no distress. Alert and oriented  x 3. Non-toxic.  Chronically ill appearing  Eyes: Pale conjunctiva pupils equal round and reactive accommodation EOMs are intact  EENT: TMs clear. Posterior oropharynx unremarkable. No meningismus. No LAD.     NECK: Supple. Nontender. No midline tenderness.     CARDIAC: Regular, rate, rhythm. No  murmurs rubs or gallops. No JVD    PULMONARY: Decreased breath sound right of the lung with dullness to percussion right base no wheezes rales or rhonchi. No respiratory distress.  No tachypnea stridor or retractions able to speak in full sentences    ABDOMEN: Soft, nonsurgical. Nontender. No peritoneal signs. Normoactive bowel sounds. No pulsatile masses.   Rectal: Normal tone stool is dark occult positive.  EXTREMITIES: No peripheral edema. Negative Homans bilaterally, no cords.  1+ distal pulses well-perfused left upper extremity AV fistula palpable thrill.    SKIN: No rash. Intact.     NEURO: No focal neurologic deficits, NIH score of 0. Cranial nerves normal as tested from II through XII.     MEDICAL DECISION MAKING:  EKG on my interpretation shows a normal sinus rhythm normal axis LVH rate in the mid 80s with no acute ischemic changes.  CBC shows no leukocytosis, hemoglobin is 6.5, chemistry show ESRD, potassium normal, chest x-ray shows unchanged right pleural effusion.    Treatment: IV established patient with crossmatch of blood he was consented and was given 1 unit of packed red cells.  Consult: Nephrology consulted.  ED course: Patient remained stable hemodynamic.  Impression: #1 GI bleed, #2 profound anemia, #3 volume load  Plan set MDM: 49-year-old  male history of GI bleed in the past ESRD on hemodialysis noncompliant medications comes in with profound anemia due to GI losses also missed hemodialysis.  Appears to be slightly volume overloaded.  Patient did receive 1 unit packed red cells, nephrology consulted they will transfer second unit during dialysis, patient will be hospitalized for emergent hemodialysis blood transfusion and further care.  Given that he had recent upper and lower GI endoscopies done I do not think requires emergent CTA because he has not no active hematemesis or active hematochezia so my concern for hemorrhagic shock is low.  Patient not on blood  thinners.              Patient History   Medical History[1]  Surgical History[2]  Family History[3]  Social History[4]    Physical Exam   ED Triage Vitals   Temperature Heart Rate Respirations BP   04/28/25 0900 04/28/25 0900 04/28/25 0900 04/28/25 0900   36.3 °C (97.3 °F) 80 16 102/74      Pulse Ox Temp Source Heart Rate Source Patient Position   04/28/25 0900 04/28/25 1059 04/28/25 0930 04/28/25 0930   100 % Oral Monitor Lying      BP Location FiO2 (%)     04/28/25 0930 --     Left arm        Physical Exam      ED Course & MDM   ED Course as of 04/28/25 1423   Mon Apr 28, 2025   1422 Patient's lab work shows a profound anemia and ESRD, chest x-ray unchanged, patient was crossmatched blood was given 1 unit packed red cells nephrology consulted they would give second unit during dialysis and patient was hospitalized for further care. [MT]      ED Course User Index  [MT] Meli Portillo MD         Diagnoses as of 04/28/25 1423   Gastrointestinal hemorrhage with melena   Iron deficiency anemia, unspecified iron deficiency anemia type                 No data recorded     [place] Coma Scale Score: 15 (04/28/25 1155 : Ara Mcdermott RN)                           Medical Decision Making      Procedure  Critical Care    Performed by: Meli Portillo MD  Authorized by: Meli Portillo MD    Critical care provider statement:     Critical care time (minutes):  45    Critical care time was exclusive of:  Separately billable procedures and treating other patients    Critical care was necessary to treat or prevent imminent or life-threatening deterioration of the following conditions: GI bleed, critical anemia requiring emergent blood transfusion.    Critical care was time spent personally by me on the following activities:  Blood draw for specimens, development of treatment plan with patient or surrogate, discussions with consultants, discussions with primary provider, evaluation of patient's response to treatment,  examination of patient, review of old charts, re-evaluation of patient's condition, ordering and review of radiographic studies, ordering and review of laboratory studies, pulse oximetry and obtaining history from patient or surrogate    Care discussed with: admitting provider           [1]   Past Medical History:  Diagnosis Date    Angiodysplasia of stomach and duodenum without bleeding     Gastric AV malformation    COVID-19     COVID-19 virus infection    Diverticulosis of intestine, part unspecified, without perforation or abscess without bleeding     Diverticulosis    End stage renal disease (Multi) 08/02/2021    ESRD (end stage renal disease)    Other hemorrhoids     Internal hemorrhoid    Personal history of diseases of the blood and blood-forming organs and certain disorders involving the immune mechanism     History of anemia    Personal history of other diseases of the circulatory system     History of hypertension    Personal history of other diseases of the musculoskeletal system and connective tissue     History of gout    Personal history of other endocrine, nutritional and metabolic disease     History of obesity    Personal history of other medical treatment     History of blood transfusion    Residual hemorrhoidal skin tags     External hemorrhoid    Smoker     Ulcer of anus and rectum     Rectal ulcer    Unspecified osteoarthritis, unspecified site     Osteoarthritis   [2]   Past Surgical History:  Procedure Laterality Date    CT ABDOMEN PELVIS ANGIOGRAM W AND/OR WO IV CONTRAST  5/24/2023    CT ABDOMEN PELVIS ANGIOGRAM W AND/OR WO IV CONTRAST 5/24/2023 [facility] CT    IR VENOGRAM DIALYSIS  5/26/2023    IR VENOGRAM DIALYSIS 5/26/2023 [facility]    OTHER SURGICAL HISTORY  01/18/2022    Dialysis tunneled catheter placement    OTHER SURGICAL HISTORY  01/19/2022    Arteriovenous fistula creation procedure   [3] No family history on file.  [4]   Social History  Tobacco Use    Smoking status: Never    Smokeless  tobacco: Never   Substance Use Topics    Alcohol use: Not Currently     Alcohol/week: 6.0 standard drinks of alcohol     Types: 6 Cans of beer per week     Comment: QUIT A MONTH AGO    Drug use: Never        Meli Portillo MD  04/28/25 [id number]

## 2025-04-28 NOTE — H&P (VIEW-ONLY)
Soren Howard is a 49 y.o. male   Weakness, Gen       Patient with a past medical history of stage renal disease on hemodialysis hypertension dyslipidemia history of recurrent colonic bleed secondary AV malformations for which she has received multiple APCs and also with nonbleeding gastric AVMs history of seizure disorder depression esophagitis presents with blood loss anemia  Patient says over the last few days he has been noticing dark maroon blood in his stool  Denies any chest pain palpitation shortness of breath  Workup also shows a large pleural effusion on the right side  We ordered thoracentesis for diagnostic and therapeutic purposes but the patient refuses  He insists on leaving tonight after dialysis    Past Medical History  Medical History[1]    Surgical History  Surgical History[2]     Social History  He reports that he has never smoked. He has never used smokeless tobacco. He reports that he does not currently use alcohol after a past usage of about 6.0 standard drinks of alcohol per week. He reports that he does not use drugs.    Family History  Family History[3]     Allergies  Piperacillin-tazobactam-dextrs    Review of Systems     Not willing to participate in review of systems      Vitals:    04/28/25 1834   BP: (!) 147/93   Pulse: 74   Resp:    Temp:    SpO2:         Scheduled medications  Scheduled Medications[4]  Continuous medications  Continuous Medications[5]  PRN medications  PRN Medications[6]    Results from last 7 days   Lab Units 04/28/25  0919   WBC AUTO x10*3/uL 7.5   HEMOGLOBIN g/dL 6.2*   HEMATOCRIT % 19.5*   PLATELETS AUTO x10*3/uL 216     Results from last 7 days   Lab Units 04/28/25  0919   SODIUM mmol/L 138   POTASSIUM mmol/L 5.5*   CHLORIDE mmol/L 104   CO2 mmol/L 21   BUN mg/dL 110*   CREATININE mg/dL 18.91*   CALCIUM mg/dL 7.1*   PROTEIN TOTAL g/dL 6.0*   BILIRUBIN TOTAL mg/dL 0.3   ALK PHOS U/L 42   ALT U/L 6*   AST U/L 9   GLUCOSE mg/dL 107*            XR chest 1 view    Final Result   1.  Large right-sided pleural effusion with asymmetric opacification   of the right mid and right lower lung as well as geographic opacity   of the right lung apex, not significantly improved since prior   comparison imaging.             Signed by: Stanislaw Youssef 4/28/2025 12:23 PM   Dictation workstation:   [id number]      Consult to Interventional Radiology    (Results Pending)       Physical Exam      Constitutional   General appearance: Alert and in no acute distress.   Eyes   Inspection of eyes: Sclera and conjunctiva were normal.    Pupil exam: Pupils were equal in size. Extraocular movements were intact.   Pulmonary   Respiratory assessment: Crackles in lungs with decreased breath sounds  Cardiovascular   Auscultation of heart: Apical pulse normal, heart rate and rhythm normal, normal S1 and S2, no murmurs and no pericardial rub.    Exam for edema: No peripheral edema.   Abdomen   Abdominal Exam: No bruits, normal bowel sounds, soft, non-tender, no abdominal mass palpated.    Liver and Spleen exam: No hepato-splenomegaly.   Musculoskeletal     Inspection/palpation of joints, bones and muscles: No joint swelling. Normal movement of all extremities.   Skin   Skin inspection: Normal skin color and pigmentation, normal skin turgor and no visible rash.   Neurologic   Cranial nerves: Nerves 2-12 were intact, no focal neuro defects.   Psychiatric   Orientation: Oriented to person, place, and time.    Mood and affect: Normal.      Assessment/Plan      #Blood loss anemia  Status post blood transfusion  Known history of colonic AVMs and gastric AVMs  The current stool is dark maroon stools suspect upper rather than lower  Patient refuses to stay in the hospital    #Right-sided pleural effusion  Patient refused thoracentesis  Wants to leave tonight  Advised patient that he will have to leave AGAINST MEDICAL ADVICE  Patient's spouse at bedside made aware    #End-stage renal disease  Currently getting  hemodialysis    #Hypertension  #Dyslipidemia  Continue home medications       [1]   Past Medical History:  Diagnosis Date    Angiodysplasia of stomach and duodenum without bleeding     Gastric AV malformation    COVID-19     COVID-19 virus infection    Diverticulosis of intestine, part unspecified, without perforation or abscess without bleeding     Diverticulosis    End stage renal disease (Multi) 08/02/2021    ESRD (end stage renal disease)    Other hemorrhoids     Internal hemorrhoid    Personal history of diseases of the blood and blood-forming organs and certain disorders involving the immune mechanism     History of anemia    Personal history of other diseases of the circulatory system     History of hypertension    Personal history of other diseases of the musculoskeletal system and connective tissue     History of gout    Personal history of other endocrine, nutritional and metabolic disease     History of obesity    Personal history of other medical treatment     History of blood transfusion    Residual hemorrhoidal skin tags     External hemorrhoid    Smoker     Ulcer of anus and rectum     Rectal ulcer    Unspecified osteoarthritis, unspecified site     Osteoarthritis   [2]   Past Surgical History:  Procedure Laterality Date    CT ABDOMEN PELVIS ANGIOGRAM W AND/OR WO IV CONTRAST  5/24/2023    CT ABDOMEN PELVIS ANGIOGRAM W AND/OR WO IV CONTRAST 5/24/2023 [facility]    IR VENOGRAM DIALYSIS  5/26/2023    IR VENOGRAM DIALYSIS 5/26/2023 [facility]    OTHER SURGICAL HISTORY  01/18/2022    Dialysis tunneled catheter placement    OTHER SURGICAL HISTORY  01/19/2022    Arteriovenous fistula creation procedure   [3] No family history on file.  [4] amLODIPine, 10 mg, oral, Daily  carvedilol, 25 mg, oral, BID  [Held by provider] hydrALAZINE, 100 mg, oral, BID  levETIRAcetam, 500 mg, oral, Daily  [START ON 4/29/2025] losartan, 25 mg, oral, Daily  [START ON 4/29/2025] pantoprazole, 40 mg, oral, Daily before breakfast    Or  [START ON 4/29/2025] pantoprazole, 40 mg, intravenous, Daily before breakfast    [5]    [6] PRN medications: acetaminophen **OR** acetaminophen **OR** acetaminophen, guaiFENesin, melatonin, ondansetron **OR** ondansetron, polyethylene glycol

## 2025-04-28 NOTE — PROGRESS NOTES
Soren Howard is a 49 y.o. male on day 0 of admission presenting with No Principal Problem: There is no principal problem currently on the Problem List. Please update the Problem List and refresh..    Plan: came to hospital for weakness/fatigue, currently getting blood transfusion. Patient states he lives home with spouse, goes to [email or web address] MWF- 10am/ Dr. Charles follows. States he does not normally skip dialysis, and last session was on Friday. Up independently to bathroom this morning. States he does not have primary doctor that he follows with.   Disposition: Home with spouse  Barrier: H/H, nephrology, weakness, anemia  ADOD:  2-3 days       04/28/25 1137   Discharge Planning   Living Arrangements Spouse/significant other   Support Systems Spouse/significant other   Assistance Needed independent with care, drives, HD @ [email or web address] MWF- 10am   Type of Residence Private residence   Number of Stairs to Enter Residence 4   Number of Stairs Within Residence 10   Do you have animals or pets at home? No   Who is requesting discharge planning? Provider   Home or Post Acute Services None   Expected Discharge Disposition Home   Does the patient need discharge transport arranged? No   Patient Choice   Patient / Family choosing to utilize agency / facility established prior to hospitalization No   Stroke Family Assessment   Stroke Family Assessment Needed No   Intensity of Service   Intensity of Service 0-30 min       Connie Lemus RN

## 2025-04-28 NOTE — POST-PROCEDURE NOTE
Report to Receiving RN:    Report To: RN PG   Time Report Called: POST HD NOW at bedside  Hand-Off Communication: patient status, weight, vitals, blood received, hypertension, and fluid removal, 2.5liters off.  Complications During Treatment: No  Ultrafiltration Treatment: Yes, 2.5kg  Medications Administered During Dialysis: No  Blood Products Administered During Dialysis: Yes, Alan  Labs Sent During Dialysis: Yes, Alan  Heparin Drip Rate Changes: No  Dialysis Catheter Dressing: N/A  Last Dressing Change: N/A    SKY LOPEZ OCDT

## 2025-04-28 NOTE — H&P
Soren Howard is a 49 y.o. male   Weakness, Gen       Patient with a past medical history of stage renal disease on hemodialysis hypertension dyslipidemia history of recurrent colonic bleed secondary AV malformations for which she has received multiple APCs and also with nonbleeding gastric AVMs history of seizure disorder depression esophagitis presents with blood loss anemia  Patient says over the last few days he has been noticing dark maroon blood in his stool  Denies any chest pain palpitation shortness of breath  Workup also shows a large pleural effusion on the right side  We ordered thoracentesis for diagnostic and therapeutic purposes but the patient refuses  He insists on leaving tonight after dialysis    Past Medical History  Medical History[1]    Surgical History  Surgical History[2]     Social History  He reports that he has never smoked. He has never used smokeless tobacco. He reports that he does not currently use alcohol after a past usage of about 6.0 standard drinks of alcohol per week. He reports that he does not use drugs.    Family History  Family History[3]     Allergies  Piperacillin-tazobactam-dextrs    Review of Systems     Not willing to participate in review of systems      Vitals:    04/28/25 1834   BP: (!) 147/93   Pulse: 74   Resp:    Temp:    SpO2:         Scheduled medications  Scheduled Medications[4]  Continuous medications  Continuous Medications[5]  PRN medications  PRN Medications[6]    Results from last 7 days   Lab Units 04/28/25  0919   WBC AUTO x10*3/uL 7.5   HEMOGLOBIN g/dL 6.2*   HEMATOCRIT % 19.5*   PLATELETS AUTO x10*3/uL 216     Results from last 7 days   Lab Units 04/28/25  0919   SODIUM mmol/L 138   POTASSIUM mmol/L 5.5*   CHLORIDE mmol/L 104   CO2 mmol/L 21   BUN mg/dL 110*   CREATININE mg/dL 18.91*   CALCIUM mg/dL 7.1*   PROTEIN TOTAL g/dL 6.0*   BILIRUBIN TOTAL mg/dL 0.3   ALK PHOS U/L 42   ALT U/L 6*   AST U/L 9   GLUCOSE mg/dL 107*            XR chest 1 view    Final Result   1.  Large right-sided pleural effusion with asymmetric opacification   of the right mid and right lower lung as well as geographic opacity   of the right lung apex, not significantly improved since prior   comparison imaging.             Signed by: Stanislaw Youssef 4/28/2025 12:23 PM   Dictation workstation:   [id number]      Consult to Interventional Radiology    (Results Pending)       Physical Exam      Constitutional   General appearance: Alert and in no acute distress.   Eyes   Inspection of eyes: Sclera and conjunctiva were normal.    Pupil exam: Pupils were equal in size. Extraocular movements were intact.   Pulmonary   Respiratory assessment: Crackles in lungs with decreased breath sounds  Cardiovascular   Auscultation of heart: Apical pulse normal, heart rate and rhythm normal, normal S1 and S2, no murmurs and no pericardial rub.    Exam for edema: No peripheral edema.   Abdomen   Abdominal Exam: No bruits, normal bowel sounds, soft, non-tender, no abdominal mass palpated.    Liver and Spleen exam: No hepato-splenomegaly.   Musculoskeletal     Inspection/palpation of joints, bones and muscles: No joint swelling. Normal movement of all extremities.   Skin   Skin inspection: Normal skin color and pigmentation, normal skin turgor and no visible rash.   Neurologic   Cranial nerves: Nerves 2-12 were intact, no focal neuro defects.   Psychiatric   Orientation: Oriented to person, place, and time.    Mood and affect: Normal.      Assessment/Plan      #Blood loss anemia  Status post blood transfusion  Known history of colonic AVMs and gastric AVMs  The current stool is dark maroon stools suspect upper rather than lower  Patient refuses to stay in the hospital    #Right-sided pleural effusion  Patient refused thoracentesis  Wants to leave tonight  Advised patient that he will have to leave AGAINST MEDICAL ADVICE  Patient's spouse at bedside made aware    #End-stage renal disease  Currently getting  hemodialysis    #Hypertension  #Dyslipidemia  Continue home medications       [1]   Past Medical History:  Diagnosis Date    Angiodysplasia of stomach and duodenum without bleeding     Gastric AV malformation    COVID-19     COVID-19 virus infection    Diverticulosis of intestine, part unspecified, without perforation or abscess without bleeding     Diverticulosis    End stage renal disease (Multi) 08/02/2021    ESRD (end stage renal disease)    Other hemorrhoids     Internal hemorrhoid    Personal history of diseases of the blood and blood-forming organs and certain disorders involving the immune mechanism     History of anemia    Personal history of other diseases of the circulatory system     History of hypertension    Personal history of other diseases of the musculoskeletal system and connective tissue     History of gout    Personal history of other endocrine, nutritional and metabolic disease     History of obesity    Personal history of other medical treatment     History of blood transfusion    Residual hemorrhoidal skin tags     External hemorrhoid    Smoker     Ulcer of anus and rectum     Rectal ulcer    Unspecified osteoarthritis, unspecified site     Osteoarthritis   [2]   Past Surgical History:  Procedure Laterality Date    CT ABDOMEN PELVIS ANGIOGRAM W AND/OR WO IV CONTRAST  5/24/2023    CT ABDOMEN PELVIS ANGIOGRAM W AND/OR WO IV CONTRAST 5/24/2023 [facility]    IR VENOGRAM DIALYSIS  5/26/2023    IR VENOGRAM DIALYSIS 5/26/2023 [facility]    OTHER SURGICAL HISTORY  01/18/2022    Dialysis tunneled catheter placement    OTHER SURGICAL HISTORY  01/19/2022    Arteriovenous fistula creation procedure   [3] No family history on file.  [4] amLODIPine, 10 mg, oral, Daily  carvedilol, 25 mg, oral, BID  [Held by provider] hydrALAZINE, 100 mg, oral, BID  levETIRAcetam, 500 mg, oral, Daily  [START ON 4/29/2025] losartan, 25 mg, oral, Daily  [START ON 4/29/2025] pantoprazole, 40 mg, oral, Daily before breakfast    Or  [START ON 4/29/2025] pantoprazole, 40 mg, intravenous, Daily before breakfast    [5]    [6] PRN medications: acetaminophen **OR** acetaminophen **OR** acetaminophen, guaiFENesin, melatonin, ondansetron **OR** ondansetron, polyethylene glycol

## 2025-04-28 NOTE — ED NOTES
Community Resource Name: Patient given a list of  primary care physicians.  Phone Number:   Staff Member:  Tiffanie Hooker    Discussed the following topics on behalf of the patient:  []  Behavioral Health Assistance     []  Case Management  []   Assistance  []  Digital Equity Assistance  []  Dental Health Assistance  []  Education Assistance  []  Employment Assistance  []  Financial Strain Relief Assistance  []  Food Insecurity Assistance  [x]  Healthcare Coverage Assistance  []  Housing Stability Assistance  []  IP Violence Relief Assistance  []  Legal Assistance  []  Physical Activity Assistance  []  Social Connection Assistance  []  Stress Relief Assistance   []  Substance Abuse Assistance  []  Transportation Assistance  []  Utility Assistance  [x]  Other: [insert comment here]  Patient doesn't have a PCP.  Next Steps:         DAVID Mcfarlane  Track patients for community healthcare services. CHW talked to patient regarding not having a primary care physician. CHW asked the patient if he would be interested in looking at a list of  primary care physicians to choose from for future service. Patient agreed and was given a list of  physicians, they are accepting new patients, they are taking his insurance([facility]), and they are in the are where he lives. Patient expressed appreciation.        DAVID Mcfarlane  04/28/25 6652

## 2025-04-28 NOTE — ED TRIAGE NOTES
States that he feels like he needs a blood transfusion. Reports that he has bleeding polups. Reports weakness and dizziness. Last blood transfusion was 2 months ago.

## 2025-04-29 DIAGNOSIS — N52.8 OTHER MALE ERECTILE DYSFUNCTION: Primary | ICD-10-CM

## 2025-04-29 RX ORDER — SILDENAFIL 50 MG/1
50 TABLET, FILM COATED ORAL DAILY PRN
Qty: 30 TABLET | Refills: 1 | Status: SHIPPED | OUTPATIENT
Start: 2025-04-29 | End: 2025-06-28

## 2025-05-09 ENCOUNTER — HOSPITAL ENCOUNTER (INPATIENT)
Facility: HOSPITAL | Age: 50
LOS: 1 days | Discharge: LEFT BEFORE DISCHARGE | End: 2025-05-09
Attending: EMERGENCY MEDICINE | Admitting: INTERNAL MEDICINE
Payer: COMMERCIAL

## 2025-05-09 ENCOUNTER — APPOINTMENT (OUTPATIENT)
Dept: RADIOLOGY | Facility: HOSPITAL | Age: 50
End: 2025-05-09
Payer: COMMERCIAL

## 2025-05-09 ENCOUNTER — APPOINTMENT (OUTPATIENT)
Dept: CARDIOLOGY | Facility: HOSPITAL | Age: 50
End: 2025-05-09
Payer: COMMERCIAL

## 2025-05-09 ENCOUNTER — APPOINTMENT (OUTPATIENT)
Dept: DIALYSIS | Facility: HOSPITAL | Age: 50
End: 2025-05-09
Payer: COMMERCIAL

## 2025-05-09 VITALS
BODY MASS INDEX: 26.53 KG/M2 | WEIGHT: 169 LBS | SYSTOLIC BLOOD PRESSURE: 136 MMHG | RESPIRATION RATE: 20 BRPM | HEIGHT: 67 IN | DIASTOLIC BLOOD PRESSURE: 88 MMHG | OXYGEN SATURATION: 92 % | TEMPERATURE: 98.4 F | HEART RATE: 89 BPM

## 2025-05-09 DIAGNOSIS — J90 PLEURAL EFFUSION: Primary | ICD-10-CM

## 2025-05-09 LAB
ABO GROUP (TYPE) IN BLOOD: NORMAL
ALBUMIN SERPL BCP-MCNC: 3.4 G/DL (ref 3.4–5)
ALP SERPL-CCNC: 55 U/L (ref 33–120)
ALT SERPL W P-5'-P-CCNC: 6 U/L (ref 10–52)
ANION GAP SERPL CALC-SCNC: 18 MMOL/L (ref 10–20)
ANION GAP SERPL CALC-SCNC: 18 MMOL/L (ref 10–20)
ANTIBODY SCREEN: NORMAL
AST SERPL W P-5'-P-CCNC: 9 U/L (ref 9–39)
ATRIAL RATE: 83 BPM
BASOPHILS # BLD AUTO: 0.04 X10*3/UL (ref 0–0.1)
BASOPHILS NFR BLD AUTO: 0.4 %
BASOPHILS NFR FLD MANUAL: 1 %
BILIRUB SERPL-MCNC: 0.2 MG/DL (ref 0–1.2)
BLASTS NFR FLD MANUAL: 0 % (ref ?–0)
BNP SERPL-MCNC: 47 PG/ML (ref 0–99)
BUN SERPL-MCNC: 38 MG/DL (ref 6–23)
BUN SERPL-MCNC: 40 MG/DL (ref 6–23)
CALCIUM SERPL-MCNC: 7.8 MG/DL (ref 8.6–10.3)
CALCIUM SERPL-MCNC: 8 MG/DL (ref 8.6–10.3)
CARDIAC TROPONIN I PNL SERPL HS: 46 NG/L (ref 0–20)
CARDIAC TROPONIN I PNL SERPL HS: 52 NG/L (ref 0–20)
CHLORIDE SERPL-SCNC: 102 MMOL/L (ref 98–107)
CHLORIDE SERPL-SCNC: 103 MMOL/L (ref 98–107)
CLARITY FLD: ABNORMAL
CO2 SERPL-SCNC: 24 MMOL/L (ref 21–32)
CO2 SERPL-SCNC: 24 MMOL/L (ref 21–32)
COLOR FLD: ABNORMAL
CREAT SERPL-MCNC: 10.19 MG/DL (ref 0.5–1.3)
CREAT SERPL-MCNC: 9.96 MG/DL (ref 0.5–1.3)
EGFRCR SERPLBLD CKD-EPI 2021: 6 ML/MIN/1.73M*2
EGFRCR SERPLBLD CKD-EPI 2021: 6 ML/MIN/1.73M*2
EOSINOPHIL # BLD AUTO: 0.62 X10*3/UL (ref 0–0.7)
EOSINOPHIL NFR BLD AUTO: 6.6 %
EOSINOPHIL NFR FLD MANUAL: 0 %
ERYTHROCYTE [DISTWIDTH] IN BLOOD BY AUTOMATED COUNT: 17.4 % (ref 11.5–14.5)
ERYTHROCYTE [DISTWIDTH] IN BLOOD BY AUTOMATED COUNT: 17.5 % (ref 11.5–14.5)
FLUAV RNA RESP QL NAA+PROBE: NOT DETECTED
FLUBV RNA RESP QL NAA+PROBE: NOT DETECTED
GLUCOSE SERPL-MCNC: 81 MG/DL (ref 74–99)
GLUCOSE SERPL-MCNC: 89 MG/DL (ref 74–99)
HCT VFR BLD AUTO: 25.4 % (ref 41–52)
HCT VFR BLD AUTO: 25.8 % (ref 41–52)
HGB BLD-MCNC: 7.8 G/DL (ref 13.5–17.5)
HGB BLD-MCNC: 8.1 G/DL (ref 13.5–17.5)
IMM GRANULOCYTES # BLD AUTO: 0.3 X10*3/UL (ref 0–0.7)
IMM GRANULOCYTES NFR BLD AUTO: 3.2 % (ref 0–0.9)
IMMATURE GRANULOCYTES IN FLUID: 0 %
LYMPHOCYTES # BLD AUTO: 1.09 X10*3/UL (ref 1.2–4.8)
LYMPHOCYTES NFR BLD AUTO: 11.5 %
LYMPHOCYTES NFR FLD MANUAL: 93 %
MCH RBC QN AUTO: 29.7 PG (ref 26–34)
MCH RBC QN AUTO: 29.7 PG (ref 26–34)
MCHC RBC AUTO-ENTMCNC: 30.7 G/DL (ref 32–36)
MCHC RBC AUTO-ENTMCNC: 31.4 G/DL (ref 32–36)
MCV RBC AUTO: 95 FL (ref 80–100)
MCV RBC AUTO: 97 FL (ref 80–100)
MONOCYTES # BLD AUTO: 1.17 X10*3/UL (ref 0.1–1)
MONOCYTES NFR BLD AUTO: 12.4 %
MONOS+MACROS NFR FLD MANUAL: 5 %
MRSA DNA SPEC QL NAA+PROBE: NOT DETECTED
NEUTROPHILS # BLD AUTO: 6.23 X10*3/UL (ref 1.2–7.7)
NEUTROPHILS NFR BLD AUTO: 65.9 %
NEUTROPHILS NFR FLD MANUAL: 1 %
NRBC BLD-RTO: 0 /100 WBCS (ref 0–0)
NRBC BLD-RTO: 0.2 /100 WBCS (ref 0–0)
OTHER CELLS NFR FLD MANUAL: 0 % (ref ?–0)
P AXIS: 32 DEGREES
P OFFSET: 154 MS
P ONSET: 102 MS
PATH REVIEW-CELL CT,FLUID: NORMAL
PLASMA CELLS NFR FLD MANUAL: 0 % (ref ?–0)
PLATELET # BLD AUTO: 260 X10*3/UL (ref 150–450)
PLATELET # BLD AUTO: 269 X10*3/UL (ref 150–450)
POTASSIUM SERPL-SCNC: 3.7 MMOL/L (ref 3.5–5.3)
POTASSIUM SERPL-SCNC: 3.7 MMOL/L (ref 3.5–5.3)
PR INTERVAL: 226 MS
PROT SERPL-MCNC: 6.3 G/DL (ref 6.4–8.2)
Q ONSET: 215 MS
QRS COUNT: 13 BEATS
QRS DURATION: 106 MS
QT INTERVAL: 398 MS
QTC CALCULATION(BAZETT): 467 MS
QTC FREDERICIA: 443 MS
R AXIS: 26 DEGREES
RBC # BLD AUTO: 2.63 X10*6/UL (ref 4.5–5.9)
RBC # BLD AUTO: 2.73 X10*6/UL (ref 4.5–5.9)
RBC # FLD AUTO: 7000 /UL
RH FACTOR (ANTIGEN D): NORMAL
RSV RNA RESP QL NAA+PROBE: NOT DETECTED
SARS-COV-2 RNA RESP QL NAA+PROBE: NOT DETECTED
SODIUM SERPL-SCNC: 140 MMOL/L (ref 136–145)
SODIUM SERPL-SCNC: 141 MMOL/L (ref 136–145)
T AXIS: 92 DEGREES
T OFFSET: 414 MS
TOTAL CELLS COUNTED FLD: 100
VENTRICULAR RATE: 83 BPM
WBC # BLD AUTO: 11.8 X10*3/UL (ref 4.4–11.3)
WBC # BLD AUTO: 9.5 X10*3/UL (ref 4.4–11.3)
WBC # FLD AUTO: 375 /UL

## 2025-05-09 PROCEDURE — 80053 COMPREHEN METABOLIC PANEL: CPT

## 2025-05-09 PROCEDURE — 36415 COLL VENOUS BLD VENIPUNCTURE: CPT

## 2025-05-09 PROCEDURE — 87641 MR-STAPH DNA AMP PROBE: CPT | Performed by: INTERNAL MEDICINE

## 2025-05-09 PROCEDURE — 84157 ASSAY OF PROTEIN OTHER: CPT | Mod: AHULAB

## 2025-05-09 PROCEDURE — 5A1D70Z PERFORMANCE OF URINARY FILTRATION, INTERMITTENT, LESS THAN 6 HOURS PER DAY: ICD-10-PCS | Performed by: INTERNAL MEDICINE

## 2025-05-09 PROCEDURE — 2500000001 HC RX 250 WO HCPCS SELF ADMINISTERED DRUGS (ALT 637 FOR MEDICARE OP): Performed by: INTERNAL MEDICINE

## 2025-05-09 PROCEDURE — 0W993ZZ DRAINAGE OF RIGHT PLEURAL CAVITY, PERCUTANEOUS APPROACH: ICD-10-PCS | Performed by: NURSE PRACTITIONER

## 2025-05-09 PROCEDURE — 85025 COMPLETE CBC W/AUTO DIFF WBC: CPT

## 2025-05-09 PROCEDURE — 85027 COMPLETE CBC AUTOMATED: CPT | Performed by: INTERNAL MEDICINE

## 2025-05-09 PROCEDURE — 83615 LACTATE (LD) (LDH) ENZYME: CPT | Mod: AHULAB

## 2025-05-09 PROCEDURE — 32555 ASPIRATE PLEURA W/ IMAGING: CPT

## 2025-05-09 PROCEDURE — 82042 OTHER SOURCE ALBUMIN QUAN EA: CPT | Mod: AHULAB

## 2025-05-09 PROCEDURE — 80048 BASIC METABOLIC PNL TOTAL CA: CPT | Mod: CCI | Performed by: INTERNAL MEDICINE

## 2025-05-09 PROCEDURE — 87070 CULTURE OTHR SPECIMN AEROBIC: CPT | Mod: AHULAB

## 2025-05-09 PROCEDURE — 82945 GLUCOSE OTHER FLUID: CPT | Mod: AHULAB

## 2025-05-09 PROCEDURE — 71046 X-RAY EXAM CHEST 2 VIEWS: CPT | Mod: FOREIGN READ | Performed by: RADIOLOGY

## 2025-05-09 PROCEDURE — 83880 ASSAY OF NATRIURETIC PEPTIDE: CPT

## 2025-05-09 PROCEDURE — 99285 EMERGENCY DEPT VISIT HI MDM: CPT | Mod: 25 | Performed by: EMERGENCY MEDICINE

## 2025-05-09 PROCEDURE — 87636 SARSCOV2 & INF A&B AMP PRB: CPT

## 2025-05-09 PROCEDURE — 86901 BLOOD TYPING SEROLOGIC RH(D): CPT

## 2025-05-09 PROCEDURE — 89051 BODY FLUID CELL COUNT: CPT

## 2025-05-09 PROCEDURE — 99222 1ST HOSP IP/OBS MODERATE 55: CPT | Performed by: INTERNAL MEDICINE

## 2025-05-09 PROCEDURE — 71045 X-RAY EXAM CHEST 1 VIEW: CPT

## 2025-05-09 PROCEDURE — 82150 ASSAY OF AMYLASE: CPT | Mod: AHULAB

## 2025-05-09 PROCEDURE — 1200000002 HC GENERAL ROOM WITH TELEMETRY DAILY

## 2025-05-09 PROCEDURE — 71045 X-RAY EXAM CHEST 1 VIEW: CPT | Performed by: RADIOLOGY

## 2025-05-09 PROCEDURE — 93005 ELECTROCARDIOGRAM TRACING: CPT

## 2025-05-09 PROCEDURE — 84484 ASSAY OF TROPONIN QUANT: CPT

## 2025-05-09 PROCEDURE — 71046 X-RAY EXAM CHEST 2 VIEWS: CPT

## 2025-05-09 PROCEDURE — 2500000004 HC RX 250 GENERAL PHARMACY W/ HCPCS (ALT 636 FOR OP/ED): Mod: JZ | Performed by: NURSE PRACTITIONER

## 2025-05-09 RX ORDER — ONDANSETRON HYDROCHLORIDE 2 MG/ML
4 INJECTION, SOLUTION INTRAVENOUS EVERY 8 HOURS PRN
Status: DISCONTINUED | OUTPATIENT
Start: 2025-05-09 | End: 2025-05-09 | Stop reason: HOSPADM

## 2025-05-09 RX ORDER — HEPARIN SODIUM 5000 [USP'U]/ML
5000 INJECTION, SOLUTION INTRAVENOUS; SUBCUTANEOUS EVERY 8 HOURS SCHEDULED
Status: DISCONTINUED | OUTPATIENT
Start: 2025-05-09 | End: 2025-05-09 | Stop reason: HOSPADM

## 2025-05-09 RX ORDER — LIDOCAINE HYDROCHLORIDE 10 MG/ML
INJECTION, SOLUTION EPIDURAL; INFILTRATION; INTRACAUDAL; PERINEURAL
Status: COMPLETED | OUTPATIENT
Start: 2025-05-09 | End: 2025-05-09

## 2025-05-09 RX ORDER — LOSARTAN POTASSIUM 25 MG/1
25 TABLET ORAL DAILY
Status: DISCONTINUED | OUTPATIENT
Start: 2025-05-09 | End: 2025-05-09 | Stop reason: HOSPADM

## 2025-05-09 RX ORDER — CARVEDILOL 25 MG/1
25 TABLET ORAL EVERY 12 HOURS
Status: DISCONTINUED | OUTPATIENT
Start: 2025-05-09 | End: 2025-05-09 | Stop reason: HOSPADM

## 2025-05-09 RX ORDER — HYDRALAZINE HYDROCHLORIDE 50 MG/1
100 TABLET, FILM COATED ORAL 2 TIMES DAILY
Status: DISCONTINUED | OUTPATIENT
Start: 2025-05-09 | End: 2025-05-09 | Stop reason: HOSPADM

## 2025-05-09 RX ORDER — POLYETHYLENE GLYCOL 3350 17 G/17G
17 POWDER, FOR SOLUTION ORAL DAILY PRN
Status: DISCONTINUED | OUTPATIENT
Start: 2025-05-09 | End: 2025-05-09 | Stop reason: HOSPADM

## 2025-05-09 RX ORDER — ACETAMINOPHEN 325 MG/1
650 TABLET ORAL EVERY 4 HOURS PRN
Status: DISCONTINUED | OUTPATIENT
Start: 2025-05-09 | End: 2025-05-09 | Stop reason: HOSPADM

## 2025-05-09 RX ORDER — MEROPENEM AND SODIUM CHLORIDE 500 MG/50ML
500 INJECTION, SOLUTION INTRAVENOUS EVERY 24 HOURS
Status: DISCONTINUED | OUTPATIENT
Start: 2025-05-09 | End: 2025-05-09

## 2025-05-09 RX ORDER — CEFEPIME HYDROCHLORIDE 1 G/50ML
1 INJECTION, SOLUTION INTRAVENOUS EVERY 24 HOURS
Status: DISCONTINUED | OUTPATIENT
Start: 2025-05-09 | End: 2025-05-09 | Stop reason: HOSPADM

## 2025-05-09 RX ORDER — LOPERAMIDE HYDROCHLORIDE 2 MG/1
4 CAPSULE ORAL 2 TIMES DAILY
COMMUNITY

## 2025-05-09 RX ORDER — ONDANSETRON 4 MG/1
4 TABLET, FILM COATED ORAL EVERY 8 HOURS PRN
Status: DISCONTINUED | OUTPATIENT
Start: 2025-05-09 | End: 2025-05-09 | Stop reason: HOSPADM

## 2025-05-09 RX ORDER — TALC
3 POWDER (GRAM) TOPICAL NIGHTLY PRN
Status: DISCONTINUED | OUTPATIENT
Start: 2025-05-09 | End: 2025-05-09 | Stop reason: HOSPADM

## 2025-05-09 RX ORDER — ACETAMINOPHEN 650 MG/1
650 SUPPOSITORY RECTAL EVERY 4 HOURS PRN
Status: DISCONTINUED | OUTPATIENT
Start: 2025-05-09 | End: 2025-05-09 | Stop reason: HOSPADM

## 2025-05-09 RX ORDER — PANTOPRAZOLE SODIUM 40 MG/10ML
40 INJECTION, POWDER, LYOPHILIZED, FOR SOLUTION INTRAVENOUS
Status: DISCONTINUED | OUTPATIENT
Start: 2025-05-10 | End: 2025-05-09 | Stop reason: HOSPADM

## 2025-05-09 RX ORDER — ACETAMINOPHEN 160 MG/5ML
650 SOLUTION ORAL EVERY 4 HOURS PRN
Status: DISCONTINUED | OUTPATIENT
Start: 2025-05-09 | End: 2025-05-09 | Stop reason: HOSPADM

## 2025-05-09 RX ORDER — AMLODIPINE BESYLATE 10 MG/1
10 TABLET ORAL DAILY
Status: DISCONTINUED | OUTPATIENT
Start: 2025-05-10 | End: 2025-05-09 | Stop reason: HOSPADM

## 2025-05-09 RX ORDER — VANCOMYCIN HYDROCHLORIDE 1 G/20ML
INJECTION, POWDER, LYOPHILIZED, FOR SOLUTION INTRAVENOUS DAILY PRN
Status: DISCONTINUED | OUTPATIENT
Start: 2025-05-09 | End: 2025-05-09

## 2025-05-09 RX ORDER — GUAIFENESIN 600 MG/1
600 TABLET, EXTENDED RELEASE ORAL EVERY 12 HOURS PRN
Status: DISCONTINUED | OUTPATIENT
Start: 2025-05-09 | End: 2025-05-09 | Stop reason: HOSPADM

## 2025-05-09 RX ORDER — PANTOPRAZOLE SODIUM 40 MG/1
40 TABLET, DELAYED RELEASE ORAL
Status: DISCONTINUED | OUTPATIENT
Start: 2025-05-10 | End: 2025-05-09 | Stop reason: HOSPADM

## 2025-05-09 RX ADMIN — LOSARTAN POTASSIUM 25 MG: 25 TABLET, FILM COATED ORAL at 15:22

## 2025-05-09 RX ADMIN — LIDOCAINE HYDROCHLORIDE 10 ML: 10 INJECTION, SOLUTION EPIDURAL; INFILTRATION; INTRACAUDAL; PERINEURAL at 13:48

## 2025-05-09 RX ADMIN — CARVEDILOL 25 MG: 25 TABLET, FILM COATED ORAL at 15:22

## 2025-05-09 SDOH — SOCIAL STABILITY: SOCIAL INSECURITY: ARE YOU OR HAVE YOU BEEN THREATENED OR ABUSED PHYSICALLY, EMOTIONALLY, OR SEXUALLY BY ANYONE?: NO

## 2025-05-09 SDOH — ECONOMIC STABILITY: TRANSPORTATION INSECURITY: IN THE PAST 12 MONTHS, HAS LACK OF TRANSPORTATION KEPT YOU FROM MEDICAL APPOINTMENTS OR FROM GETTING MEDICATIONS?: NO

## 2025-05-09 SDOH — ECONOMIC STABILITY: HOUSING INSECURITY: AT ANY TIME IN THE PAST 12 MONTHS, WERE YOU HOMELESS OR LIVING IN A SHELTER (INCLUDING NOW)?: NO

## 2025-05-09 SDOH — SOCIAL STABILITY: SOCIAL INSECURITY: DO YOU FEEL UNSAFE GOING BACK TO THE PLACE WHERE YOU ARE LIVING?: NO

## 2025-05-09 SDOH — ECONOMIC STABILITY: HOUSING INSECURITY: IN THE LAST 12 MONTHS, WAS THERE A TIME WHEN YOU WERE NOT ABLE TO PAY THE MORTGAGE OR RENT ON TIME?: NO

## 2025-05-09 SDOH — ECONOMIC STABILITY: FOOD INSECURITY: WITHIN THE PAST 12 MONTHS, YOU WORRIED THAT YOUR FOOD WOULD RUN OUT BEFORE YOU GOT THE MONEY TO BUY MORE.: NEVER TRUE

## 2025-05-09 SDOH — SOCIAL STABILITY: SOCIAL INSECURITY: ABUSE: ADULT

## 2025-05-09 SDOH — ECONOMIC STABILITY: HOUSING INSECURITY: IN THE PAST 12 MONTHS, HOW MANY TIMES HAVE YOU MOVED WHERE YOU WERE LIVING?: 0

## 2025-05-09 SDOH — SOCIAL STABILITY: SOCIAL INSECURITY: ARE THERE ANY APPARENT SIGNS OF INJURIES/BEHAVIORS THAT COULD BE RELATED TO ABUSE/NEGLECT?: NO

## 2025-05-09 SDOH — SOCIAL STABILITY: SOCIAL INSECURITY: DO YOU FEEL ANYONE HAS EXPLOITED OR TAKEN ADVANTAGE OF YOU FINANCIALLY OR OF YOUR PERSONAL PROPERTY?: NO

## 2025-05-09 SDOH — SOCIAL STABILITY: SOCIAL INSECURITY: HAVE YOU HAD ANY THOUGHTS OF HARMING ANYONE ELSE?: NO

## 2025-05-09 SDOH — ECONOMIC STABILITY: INCOME INSECURITY: IN THE PAST 12 MONTHS HAS THE ELECTRIC, GAS, OIL, OR WATER COMPANY THREATENED TO SHUT OFF SERVICES IN YOUR HOME?: NO

## 2025-05-09 SDOH — SOCIAL STABILITY: SOCIAL INSECURITY: DOES ANYONE TRY TO KEEP YOU FROM HAVING/CONTACTING OTHER FRIENDS OR DOING THINGS OUTSIDE YOUR HOME?: NO

## 2025-05-09 SDOH — ECONOMIC STABILITY: FOOD INSECURITY: WITHIN THE PAST 12 MONTHS, THE FOOD YOU BOUGHT JUST DIDN'T LAST AND YOU DIDN'T HAVE MONEY TO GET MORE.: NEVER TRUE

## 2025-05-09 SDOH — SOCIAL STABILITY: SOCIAL INSECURITY: WERE YOU ABLE TO COMPLETE ALL THE BEHAVIORAL HEALTH SCREENINGS?: YES

## 2025-05-09 SDOH — SOCIAL STABILITY: SOCIAL INSECURITY: HAVE YOU HAD THOUGHTS OF HARMING ANYONE ELSE?: NO

## 2025-05-09 SDOH — SOCIAL STABILITY: SOCIAL INSECURITY: HAS ANYONE EVER THREATENED TO HURT YOUR FAMILY OR YOUR PETS?: NO

## 2025-05-09 ASSESSMENT — ACTIVITIES OF DAILY LIVING (ADL)
FEEDING YOURSELF: INDEPENDENT
ADEQUATE_TO_COMPLETE_ADL: YES
PATIENT'S MEMORY ADEQUATE TO SAFELY COMPLETE DAILY ACTIVITIES?: YES
LACK_OF_TRANSPORTATION: NO
WALKS IN HOME: INDEPENDENT
GROOMING: INDEPENDENT
DRESSING YOURSELF: INDEPENDENT
HEARING - LEFT EAR: FUNCTIONAL
TOILETING: INDEPENDENT
JUDGMENT_ADEQUATE_SAFELY_COMPLETE_DAILY_ACTIVITIES: YES
LACK_OF_TRANSPORTATION: NO
HEARING - RIGHT EAR: FUNCTIONAL
BATHING: INDEPENDENT

## 2025-05-09 ASSESSMENT — LIFESTYLE VARIABLES
HOW OFTEN DO YOU HAVE A DRINK CONTAINING ALCOHOL: MONTHLY OR LESS
AUDIT-C TOTAL SCORE: 1
AUDIT-C TOTAL SCORE: 1
HOW MANY STANDARD DRINKS CONTAINING ALCOHOL DO YOU HAVE ON A TYPICAL DAY: 1 OR 2
HOW OFTEN DO YOU HAVE 6 OR MORE DRINKS ON ONE OCCASION: NEVER
SKIP TO QUESTIONS 9-10: 1

## 2025-05-09 ASSESSMENT — PATIENT HEALTH QUESTIONNAIRE - PHQ9
SUM OF ALL RESPONSES TO PHQ9 QUESTIONS 1 & 2: 0
1. LITTLE INTEREST OR PLEASURE IN DOING THINGS: NOT AT ALL
2. FEELING DOWN, DEPRESSED OR HOPELESS: NOT AT ALL

## 2025-05-09 ASSESSMENT — COGNITIVE AND FUNCTIONAL STATUS - GENERAL
PATIENT BASELINE BEDBOUND: NO
DAILY ACTIVITIY SCORE: 24
MOBILITY SCORE: 24

## 2025-05-09 ASSESSMENT — COLUMBIA-SUICIDE SEVERITY RATING SCALE - C-SSRS
6. HAVE YOU EVER DONE ANYTHING, STARTED TO DO ANYTHING, OR PREPARED TO DO ANYTHING TO END YOUR LIFE?: NO
1. IN THE PAST MONTH, HAVE YOU WISHED YOU WERE DEAD OR WISHED YOU COULD GO TO SLEEP AND NOT WAKE UP?: NO
2. HAVE YOU ACTUALLY HAD ANY THOUGHTS OF KILLING YOURSELF?: NO

## 2025-05-09 ASSESSMENT — PAIN SCALES - GENERAL
PAINLEVEL_OUTOF10: 0 - NO PAIN

## 2025-05-09 ASSESSMENT — PAIN - FUNCTIONAL ASSESSMENT: PAIN_FUNCTIONAL_ASSESSMENT: 0-10

## 2025-05-09 NOTE — PROGRESS NOTES
Transitional Care Coordination Progress Note:  Plan per Medical/Surgical team: treatment of SOB, cough & pleural effusion with  Status: ED  Payor source: [place] [employer]   Discharge disposition: Home with wife  Dialysis [facility]F @ [facility]  Potential Barriers: renal 38/9.96, H/H 8.1/25.8, trop 52  ADOD: 5/11/2025   HANH Walsh RN, BSN Transitional Care Coordinator ED# [phone]      05/09/25 0849   Discharge Planning   Living Arrangements Spouse/significant other   Support Systems Spouse/significant other   Assistance Needed dialysis [email or web address] M/W/F @ [facility]   Type of Residence Private residence   Number of Stairs to Enter Residence 4   Number of Stairs Within Residence 10   Do you have animals or pets at home? No   Home or Post Acute Services None   Expected Discharge Disposition Home   Does the patient need discharge transport arranged? No   Financial Resource Strain   How hard is it for you to pay for the very basics like food, housing, medical care, and heating? Not hard   Housing Stability   In the last 12 months, was there a time when you were not able to pay the mortgage or rent on time? N   In the past 12 months, how many times have you moved where you were living? 1   At any time in the past 12 months, were you homeless or living in a shelter (including now)? N   Transportation Needs   In the past 12 months, has lack of transportation kept you from medical appointments or from getting medications? no   In the past 12 months, has lack of transportation kept you from meetings, work, or from getting things needed for daily living? No   Stroke Family Assessment   Stroke Family Assessment Needed No   Intensity of Service   Intensity of Service 0-30 min

## 2025-05-09 NOTE — ED TRIAGE NOTES
The pt states that he was up all night D/T fluid on his lungs he was here last week for the same issue the providers wanted to take the fluid off his lungs but he left stating he didn't have time.

## 2025-05-09 NOTE — INTERVAL H&P NOTE
H&P reviewed. The patient was examined and there are no changes to the H&P. Readmitted with SOB. Persistent pleural effusion for US guided thoracentesis today.

## 2025-05-09 NOTE — ED NOTES
Community Resource Name: No primary care physician.  Phone Number:   Staff Member:  Tiffanie Hooker    Discussed the following topics on behalf of the patient:  []  Behavioral Health Assistance     []  Case Management  []   Assistance  []  Digital Equity Assistance  []  Dental Health Assistance  []  Education Assistance  []  Employment Assistance  []  Financial Strain Relief Assistance  []  Food Insecurity Assistance  [x]  Healthcare Coverage Assistance  []  Housing Stability Assistance  []  IP Violence Relief Assistance  []  Legal Assistance  []  Physical Activity Assistance  []  Social Connection Assistance  []  Stress Relief Assistance   []  Substance Abuse Assistance  []  Transportation Assistance  []  Utility Assistance  [x]  Other: [insert comment here]  Patient does have a PCP. CHW updated the patient chart.  Next Steps:         DAVID Mcfarlane  Track patients for community healthcare services. CHW talked to patient regarding not having a primary care physician. Patient expressed the he does have primary care physician named Dr. Celi Langford and [employer] insurance. CHW updated the patient chart with the physician name added.  Patient expressed appreciation.          DAVID Mcfarlane  05/09/25 2527

## 2025-05-09 NOTE — PROGRESS NOTES
Pharmacy Medication History     Source of Information: Patient    Additional concerns with the patient's PTA list.   N/a  Notified Provider via Haiku : No    The following updates were made to the Prior to Admission medication list:     Medications ADDED:   N/a  Medications CHANGED:  N/a  Medications REMOVED:   N/a  Medications NOT TAKING:   N/a    Allergy reviewed : Yes    Meds 2 Beds : No    Outpatient pharmacy confirmed and updated in chart : Yes    Pharmacy name: [facility], [place]    The list below reflectives the updated PTA list. Please review each medication in order reconciliation for additional clarification and justification.    Prior to Admission Medications   Prescriptions Last Dose Informant   acetaminophen (Tylenol) 325 mg tablet More than a month Self   Sig: Take by mouth every 4 hours if needed.   amLODIPine (Norvasc) 10 mg tablet 5/9/2025 Self   Sig: Take 1 tablet (10 mg) by mouth once daily.   calcium acetate (Phoslo) 667 mg capsule  Self   Sig: Take 3 capsules (2,000 mg) by mouth 3 times daily (morning, midday, late afternoon).   Patient not taking: Reported on 4/28/2025   carvedilol (Coreg) 25 mg tablet 5/9/2025 Self   Sig: Take 1 tablet (25 mg) by mouth every 12 hours.   cholecalciferol (Vitamin D-3) 50 mcg (2,000 unit) capsule  Self   Sig: Take 1 capsule (50 mcg) by mouth early in the morning..   Patient not taking: Reported on 4/28/2025   ferrous sulfate, 325 mg ferrous sulfate, tablet  Self   Sig: Take 1 tablet (325 mg) by mouth 2 times a day.   Patient not taking: Reported on 4/28/2025   hydrALAZINE (Apresoline) 100 mg tablet 5/9/2025 Self   Sig: Take 1 tablet (100 mg) by mouth 2 times a day.   levETIRAcetam (Keppra) 500 mg tablet     Sig: Take 1 tablet (500 mg) by mouth once daily. And one additional tablet after dialysis   Patient not taking: Reported on 4/28/2025   loperamide (Imodium) 2 mg capsule  Self   Sig: Take 2 capsules (4 mg) by mouth 2 times a day.   losartan (Cozaar) 25 mg  tablet Unknown Self   Sig: Take 1 tablet (25 mg) by mouth once daily.   omeprazole (PriLOSEC) 20 mg DR capsule  Self   Sig: Take 1 capsule (20 mg) by mouth once daily in the morning. Take before meals. Do not crush or chew.   Patient not taking: Reported on 4/28/2025   sildenafil (Viagra) 50 mg tablet More than a month Self   Sig: Take 1 tablet (50 mg) by mouth once daily as needed for erectile dysfunction.      Facility-Administered Medications: None       The list below reflectives the updated allergy list. Please review each documented allergy for additional clarification and justification.    Allergies   Allergen Reactions    Piperacillin-Tazobactam-Dextsahara Hives          05/09/25 at 1:28 PM - Any [place]

## 2025-05-09 NOTE — H&P
Soren Howard is a 49 y.o. male   HPI   Patient with a past medical history of Patient with a past medical history of stage renal disease on hemodialysis hypertension dyslipidemia history of recurrent colonic bleed secondary AV malformations for which she has received multiple APCs and also with nonbleeding gastric AVMs history of seizure disorder depression esophagitis presents with worsening cough  Denies any chest pain palpitation shortness of breath  During the previous admission we had seen a large right-sided pleural effusion  We had recommended thoracentesis but the patient declined and left AMA  He now returns with increasing cough and shortness of breath  The patient was tapped and a full 50 mL fluid was removed before the patient asked that the procedure be stopped    Patient now wants to leave AMA again  He says that his nephrologist said he can leave AMA    I tried to  the patient that he he likely has an infection and it would be in his best interest to stay on IV antibiotics until we get final results over the weekend  He understands what I was trying to advise him but he wants to leave today AGAINST MEDICAL ADVICE    Past Medical History  Medical History[1]    Surgical History  Surgical History[2]     Social History  He reports that he has never smoked. He has never used smokeless tobacco. He reports that he does not currently use alcohol after a past usage of about 6.0 standard drinks of alcohol per week. He reports that he does not use drugs.    Family History  Family History[3]     Allergies  Piperacillin-tazobactam-dextrs    Review of Systems     Patient has a cough with some mild shortness of breath  Denies any other symptoms  Refused to cooperate with review of system    Vitals:    05/09/25 1525   BP: 136/88   Pulse: 89   Resp: 20   Temp: 36.9 °C (98.4 °F)   SpO2: 92%        Scheduled medications  Scheduled Medications[4]  Continuous medications  Continuous Medications[5]  PRN  medications  PRN Medications[6]    Results from last 7 days   Lab Units 05/09/25  1308 05/09/25  0726   WBC AUTO x10*3/uL 11.8* 9.5   HEMOGLOBIN g/dL 7.8* 8.1*   HEMATOCRIT % 25.4* 25.8*   PLATELETS AUTO x10*3/uL 260 269     Results from last 7 days   Lab Units 05/09/25  1308 05/09/25  0726   SODIUM mmol/L 141 140   POTASSIUM mmol/L 3.7 3.7   CHLORIDE mmol/L 103 102   CO2 mmol/L 24 24   BUN mg/dL 40* 38*   CREATININE mg/dL 10.19* 9.96*   CALCIUM mg/dL 7.8* 8.0*   PROTEIN TOTAL g/dL  --  6.3*   BILIRUBIN TOTAL mg/dL  --  0.2   ALK PHOS U/L  --  55   ALT U/L  --  6*   AST U/L  --  9   GLUCOSE mg/dL 89 81     Results from last 7 days   Lab Units 05/09/25  0844 05/09/25  0726   TROPHS ng/L 46* 52*        XR chest 1 view   Final Result   Worsening almost complete opacification of the right hemithorax due   to increasing pleural effusion and infiltrates/atelectatic changes.   Large right basilar lung mass can not be excluded. Correlate   clinically and follow-up as needed.        Signed by: Hao Mcduffie 5/9/2025 3:37 PM   Dictation workstation:   [id number]      US thoracentesis   Final Result   Uneventful thoracentesis, as detailed above: Right Pleural space, 450   mL        Performed and dictated at [facility].        Signed by: Candy Cabrera 5/9/2025 3:37 PM   Dictation workstation:   [id number]      XR chest 2 views   Final Result   Right-sided pleural effusion appears loculated.  Right lung volume is   low.  Mediastinum shifted to the right.  Left lung is clear.   No definite changes from 4/28/2025.   Signed by Soren Gan MD          Physical Exam      Constitutional   General appearance: Alert and in no acute distress.   Eyes   Inspection of eyes: Sclera and conjunctiva were normal.    Pupil exam: Pupils were equal in size. Extraocular movements were intact.   Pulmonary   Respiratory assessment: No respiratory distress, normal respiratory rhythm and effort.     Auscultation of Lungs: Decreased breath sounds  Cardiovascular   Auscultation of heart: Apical pulse normal, heart rate and rhythm normal, normal S1 and S2, no murmurs and no pericardial rub.    Exam for edema: No peripheral edema.   Abdomen   Abdominal Exam: No bruits, normal bowel sounds, soft, non-tender, no abdominal mass palpated.    Liver and Spleen exam: No hepato-splenomegaly.   Musculoskeletal   Examination of gait: Normal.    Inspection of digits and nails: No clubbing or cyanosis of the fingernails.    Inspection/palpation of joints, bones and muscles: No joint swelling. Normal movement of all extremities.   Skin   Skin inspection: Normal skin color and pigmentation, normal skin turgor and no visible rash.   Neurologic   Cranial nerves: Nerves 2-12 were intact, no focal neuro defects.   Psychiatric   Orientation: Oriented to person, place, and time.    Mood and affect: Normal.      Assessment/Plan      #Loculated pleural effusion  Suspect pneumonia  S/p incomplete thoracentesis  IR recommended to involve CT surgery because they were concerned about trapped lung    Discussed with the patient  He is leaving AMA  Patient understands the risks of an incompletely treated pneumonia/effusion which could be infected  I advised the patient that more than likely he will get worse and will come back in a worse state than what he is not currently  He understands but wants to leave AMA    End-stage renal disease  Chronic anemia  Hypertension  Dyslipidemia       [1]   Past Medical History:  Diagnosis Date    Angiodysplasia of stomach and duodenum without bleeding     Gastric AV malformation    COVID-19     COVID-19 virus infection    Diverticulosis of intestine, part unspecified, without perforation or abscess without bleeding     Diverticulosis    End stage renal disease (Multi) 08/02/2021    ESRD (end stage renal disease)    Other hemorrhoids     Internal hemorrhoid    Personal history of diseases of the blood and  blood-forming organs and certain disorders involving the immune mechanism     History of anemia    Personal history of other diseases of the circulatory system     History of hypertension    Personal history of other diseases of the musculoskeletal system and connective tissue     History of gout    Personal history of other endocrine, nutritional and metabolic disease     History of obesity    Personal history of other medical treatment     History of blood transfusion    Residual hemorrhoidal skin tags     External hemorrhoid    Smoker     Ulcer of anus and rectum     Rectal ulcer    Unspecified osteoarthritis, unspecified site     Osteoarthritis   [2]   Past Surgical History:  Procedure Laterality Date    CT ABDOMEN PELVIS ANGIOGRAM W AND/OR WO IV CONTRAST  5/24/2023    CT ABDOMEN PELVIS ANGIOGRAM W AND/OR WO IV CONTRAST 5/24/2023 [facility] CT    IR VENOGRAM DIALYSIS  5/26/2023    IR VENOGRAM DIALYSIS 5/26/2023 [facility]IO    OTHER SURGICAL HISTORY  01/18/2022    Dialysis tunneled catheter placement    OTHER SURGICAL HISTORY  01/19/2022    Arteriovenous fistula creation procedure   [3] No family history on file.  [4] [START ON 5/10/2025] amLODIPine, 10 mg, oral, Daily  carvedilol, 25 mg, oral, q12h  cefepime, 1 g, intravenous, q24h  heparin (porcine), 5,000 Units, subcutaneous, q8h BELLA  hydrALAZINE, 100 mg, oral, BID  losartan, 25 mg, oral, Daily  [START ON 5/10/2025] pantoprazole, 40 mg, oral, Daily before breakfast   Or  [START ON 5/10/2025] pantoprazole, 40 mg, intravenous, Daily before breakfast    [5]    [6] PRN medications: acetaminophen **OR** acetaminophen **OR** acetaminophen, guaiFENesin, melatonin, ondansetron **OR** ondansetron, polyethylene glycol

## 2025-05-09 NOTE — POST-PROCEDURE NOTE
Interventional Radiology Brief Postprocedure Note    Attending: Candy Cabrera CNP    Assistant: none    Diagnosis: Pleural effusion    Description of procedure: Ultrasound guided thoracentesis was preformed on the right.  450mL of clear yakov fluid was drained.  Patient noted severe pleuritic pain, procedure was stopped. Mild-moderate simple appearing pleural effusion remains. CXR appears stable.     Anesthesia:  Local    Complications: None    Estimated Blood Loss: minimal    Specimens: Yes     See detailed result report with images in PACS.    The patient tolerated the procedure well without incident or complication and is in stable condition.

## 2025-05-09 NOTE — PROGRESS NOTES
05/09/25 0847   [facility] Disability Status   Are you deaf or do you have serious difficulty hearing? N   Are you blind or do you have serious difficulty seeing, even when wearing glasses? N   Because of a physical, mental, or emotional condition, do you have serious difficulty concentrating, remembering, or making decisions? (5 years old or older) N  (seizure disorder)   Do you have serious difficulty walking or climbing stairs? N   Do you have serious difficulty dressing or bathing? N   Because of a physical, mental, or emotional condition, do you have serious difficulty doing errands alone such as visiting the doctor? N

## 2025-05-09 NOTE — CONSULTS
''Infectious Disease Consult Note''        Referred by Dr Maradiaga  Reason For Consult: PNA and loculated pleural effusion       History Of Present Illness:  Pt is a 50 yo M with h/o HTN, ESRD on HD (MWF), GIB, HLP, recent admission with GI bleed and R pleural effusion (Pt ultimately signed out AMA), admitted on 5/9 with cough. On admission he was afebrile. His wbc was 9K and BNP 47. CXR showed loculated R pleural effusion. ID is consulted for abx management.       Current Antibiotic:  Vancomycin   Meropenem    Medications:  Medications Ordered Prior to Encounter[1]  Medical History[2]  Surgical History[3]  Social History     Socioeconomic History    Marital status:      Spouse name: Not on file    Number of children: Not on file    Years of education: Not on file    Highest education level: Not on file   Occupational History    Not on file   Tobacco Use    Smoking status: Never    Smokeless tobacco: Never   Substance and Sexual Activity    Alcohol use: Not Currently     Alcohol/week: 6.0 standard drinks of alcohol     Types: 6 Cans of beer per week     Comment: QUIT A MONTH AGO    Drug use: Never    Sexual activity: Not on file   Other Topics Concern    Not on file   Social History Narrative    Not on file     Social Drivers of Health     Financial Resource Strain: Low Risk  (5/9/2025)    Overall Financial Resource Strain (CARDIA)     Difficulty of Paying Living Expenses: Not hard at all   Food Insecurity: No Food Insecurity (5/9/2025)    Hunger Vital Sign     Worried About Running Out of Food in the Last Year: Never true     Ran Out of Food in the Last Year: Never true   Transportation Needs: No Transportation Needs (5/9/2025)    PRAPARE - Transportation     Lack of Transportation (Medical): No     Lack of Transportation (Non-Medical): No   Physical Activity: Inactive (4/28/2025)    Exercise Vital Sign  "    Days of Exercise per Week: 0 days     Minutes of Exercise per Session: 0 min   Stress: No Stress Concern Present (4/28/2025)    [employer] - Occupational Stress Questionnaire     Feeling of Stress : Not at all   Social Connections: Socially Isolated (4/28/2025)    Social Connection and Isolation Panel [NHANES]     Frequency of Communication with Friends and Family: Never     Frequency of Social Gatherings with Friends and Family: Never     Attends [employer] Services: Never     Active Member of Clubs or Organizations: No     Attends Club or Organization Meetings: Never     Marital Status:    Intimate Partner Violence: Not At Risk (4/28/2025)    Humiliation, Afraid, Rape, and Kick questionnaire     Fear of Current or Ex-Partner: No     Emotionally Abused: No     Physically Abused: No     Sexually Abused: No   Housing Stability: Low Risk  (5/9/2025)    Housing Stability Vital Sign     Unable to Pay for Housing in the Last Year: No     Number of Times Moved in the Last Year: 0     Homeless in the Last Year: No     Family History[4]  Allergies[5]    Review of Systems:   General: no fevers, chills  Skin: no rashes or wounds  Head: no headache  ENT: no sore throat   Chest: no chest pain   Resp:+ cough, or sob  GI: no nausea, vomiting, or diarrhea   Ext: + edema  MSK: no joint pain or swelling        Physical Exam:  /84   Pulse 86   Temp 36.7 °C (98 °F)   Resp 16   Ht 1.702 m (5' 7\")   Wt 76.7 kg (169 lb)   SpO2 94%   BMI 26.47 kg/m²   General: NAD, nontoxic appearing  Skin: no rashes or wounds  Eyes: no scleral icterus  ENT: no oral thrush or lesions  Resp: R sided diminished breathing sounds   CV:  normal S1/S2, no murmur   Abd: soft, non-tender  Back: no CVA tenderness   Ext: no edema  Neuro: AAOx3       Lab:  Lab Results   Component Value Date    WBC 9.5 05/09/2025    HGB 8.1 (L) 05/09/2025    HCT 25.8 (L) 05/09/2025    MCV 95 05/09/2025     05/09/2025    " "  Results from last 72 hours   Lab Units 05/09/25  0726   SODIUM mmol/L 140   POTASSIUM mmol/L 3.7   CHLORIDE mmol/L 102   CO2 mmol/L 24   BUN mg/dL 38*   CREATININE mg/dL 9.96*   GLUCOSE mg/dL 81   CALCIUM mg/dL 8.0*   ANION GAP mmol/L 18   EGFR mL/min/1.73m*2 6*     Results from last 72 hours   Lab Units 05/09/25  0726   ALK PHOS U/L 55   BILIRUBIN TOTAL mg/dL 0.2   PROTEIN TOTAL g/dL 6.3*   ALT U/L 6*   AST U/L 9   ALBUMIN g/dL 3.4     Estimated Creatinine Clearance: 8.5 mL/min (A) (by C-G formula based on SCr of 9.96 mg/dL (H)).  No results found for: \"CRP\", \"SEDRATE\"  No results found for: \"HIV1X2\", \"HIVCONF\", \"OXCAHQ3DT\"  No results found for: \"HCVPCRQUANT\"      Cultures/Micro:-         Imaging: reviewed       Assessment:  Pt is a 50 yo M with h/o HTN, GIB, HLP, admitted on 5/9 with cough.     -Loculated R pleural effusion  -ESRD on HD (MWF)  -Recent admission with GI bleed and R pleural effusion (Pt ultimately signed out AMA)  -Allergy to zosyn       Plan/Recommendations:  -c/w vancomycin and meropenem  -blood cx and PNA w/u  -Pulmonary consult and thoracentesis  -Monitoring for adverse effects of abx such as diarrhea or rash          Please call ID with any concerns or questions.     Jorgito Avila MD  [facility]  #[phone]           [1]   No current facility-administered medications on file prior to encounter.     Current Outpatient Medications on File Prior to Encounter   Medication Sig Dispense Refill    acetaminophen (Tylenol) 325 mg tablet Take by mouth every 4 hours if needed.      amLODIPine (Norvasc) 10 mg tablet Take 1 tablet (10 mg) by mouth once daily. 30 tablet 7    carvedilol (Coreg) 25 mg tablet Take 1 tablet (25 mg) by mouth every 12 hours. 60 tablet 7    hydrALAZINE (Apresoline) 100 mg tablet Take 1 tablet (100 mg) by mouth 2 times a day. 60 tablet 10    sildenafil (Viagra) 50 mg tablet Take 1 tablet (50 mg) by mouth once daily as needed for erectile dysfunction. 30 tablet 1 "    calcium acetate (Phoslo) 667 mg capsule Take 3 capsules (2,000 mg) by mouth 3 times daily (morning, midday, late afternoon). (Patient not taking: Reported on 4/28/2025) 270 capsule 5    cholecalciferol (Vitamin D-3) 50 mcg (2,000 unit) capsule Take 1 capsule (50 mcg) by mouth early in the morning.. (Patient not taking: Reported on 4/28/2025) 60 capsule 4    ferrous sulfate, 325 mg ferrous sulfate, tablet Take 1 tablet (325 mg) by mouth 2 times a day. (Patient not taking: Reported on 4/28/2025) 60 tablet 8    levETIRAcetam (Keppra) 500 mg tablet Take 1 tablet (500 mg) by mouth once daily. And one additional tablet after dialysis (Patient not taking: Reported on 4/28/2025) 30 tablet 1    loperamide (Imodium) 2 mg capsule Take 2 capsules (4 mg) by mouth 2 times a day.      losartan (Cozaar) 25 mg tablet Take 1 tablet (25 mg) by mouth once daily. 30 tablet 7    omeprazole (PriLOSEC) 20 mg DR capsule Take 1 capsule (20 mg) by mouth once daily in the morning. Take before meals. Do not crush or chew. (Patient not taking: Reported on 4/28/2025) 30 capsule 11   [2]   Past Medical History:  Diagnosis Date    Angiodysplasia of stomach and duodenum without bleeding     Gastric AV malformation    COVID-19     COVID-19 virus infection    Diverticulosis of intestine, part unspecified, without perforation or abscess without bleeding     Diverticulosis    End stage renal disease (Multi) 08/02/2021    ESRD (end stage renal disease)    Other hemorrhoids     Internal hemorrhoid    Personal history of diseases of the blood and blood-forming organs and certain disorders involving the immune mechanism     History of anemia    Personal history of other diseases of the circulatory system     History of hypertension    Personal history of other diseases of the musculoskeletal system and connective tissue     History of gout    Personal history of other endocrine, nutritional and metabolic disease     History of obesity    Personal history  of other medical treatment     History of blood transfusion    Residual hemorrhoidal skin tags     External hemorrhoid    Smoker     Ulcer of anus and rectum     Rectal ulcer    Unspecified osteoarthritis, unspecified site     Osteoarthritis   [3]   Past Surgical History:  Procedure Laterality Date    CT ABDOMEN PELVIS ANGIOGRAM W AND/OR WO IV CONTRAST  5/24/2023    CT ABDOMEN PELVIS ANGIOGRAM W AND/OR WO IV CONTRAST 5/24/2023 [facility] CT    IR VENOGRAM DIALYSIS  5/26/2023    IR VENOGRAM DIALYSIS 5/26/2023 [facility]    OTHER SURGICAL HISTORY  01/18/2022    Dialysis tunneled catheter placement    OTHER SURGICAL HISTORY  01/19/2022    Arteriovenous fistula creation procedure   [4] No family history on file.  [5]   Allergies  Allergen Reactions    Piperacillin-Tazobactam-Dextrs Hives

## 2025-05-09 NOTE — NURSING NOTE
Pt leaving AMA MD made aware and attempted to talk to patient. Pt signed AMA waiver without complications. Supervisor made aware also.

## 2025-05-09 NOTE — PRE-PROCEDURE NOTE
Report from Sending RN:    Report From: Jacqueline RN   Recent Surgery of Procedure: No  Baseline Level of Consciousness (LOC): x4  Oxygen Use: No  Type: na  Diabetic: No  Last BP Med Given Day of Dialysis: see emar   Last Pain Med Given: see emar   Lab Tests to be Obtained with Dialysis: No  Blood Transfusion to be Given During Dialysis: No  Available IV Access: Yes  Medications to be Administered During Dialysis: No  Continuous IV Infusion Running: No  Restraints on Currently or in the Last 24 Hours: No  Hand-Off Communication: stable for hd no issues   Dialysis Catheter Dressing:   Last Dressing Change:

## 2025-05-09 NOTE — ED PROVIDER NOTES
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  Reports he is compliant with dialysis did go on Wednesday.  Patient reports that he has had a cough since last night.  Reports the cough is mainly dry with occasional clear sputum production.  He reports that he was recently admitted on 4/28/2025 for GI bleed and pleural effusion on the right.  He ultimately signed out AGAINST MEDICAL ADVICE.  He denies any fevers.  Denies nausea, vomiting, or diarrhea.  Does report mild rhinorrhea but no sore throat, headache, or bodyaches.  No chest pain.  Does not feel short of breath.  No hemoptysis.  No recent travel or surgeries.  No history of PE or DVT.  No leg swelling.  No history of CHF per patient. Denies rectal bleeding. Denies melena or hematochezia.  No history of cancer.  No other symptoms or concerns at this time.      History provided by:  Patient   used: No            Patient History   Medical History[1]  Surgical History[2]  Family History[3]  Social History[4]    Physical Exam   ED Triage Vitals [05/09/25 0648]   Temperature Heart Rate Respirations BP   36.7 °C (98.1 °F) 83 20 78/55      Pulse Ox Temp Source Heart Rate Source Patient Position   (!) 92 % Oral Monitor Sitting      BP Location FiO2 (%)     Right arm --       Physical Exam  Constitutional: Vital signs per nursing notes.  Well developed, well nourished.  No acute distress.    Psychiatric: alert and oriented to person, place, and time; no abnormalities of mood or affect; memory intact  Eyes: PERRL; conjunctivae and lids normal; EOMI  ENT: Ears normal externally; face symmetric. voice normal  Neck: neck supple, no meningismus; trachea midline without deviation  Respiratory: normal respiratory  effort and excursion; diminished breath sounds on the right lung field   Cardiovascular: RRR, 2+ pulses extremities   Neurological: normal speech; CN II-XII grossly intact, normal motor and sensory function;  GI: no distention, soft, nontender  : Deferred  Musculoskeletal: normal gait and station; normal digits and nails; normal to palpation; normal strength/tone; neurovascular status intact.  Skin: normal to inspection; normal to palpation; no rash  Peripheral vascular: No peripheral edema.  No calf tenderness.    ED Course & MDM   ED Course as of 05/09/25 1034   Fri May 09, 2025   0737 I personally interpreted the chest x-ray.  Chest x-ray shows large right sided pleural effusion.  Final disposition and treatment pending radiology read  [MC]   0752 IMPRESSION:  Right-sided pleural effusion appears loculated.  Right lung volume is  low.  Mediastinum shifted to the right.  Left lung is clear.  No definite changes from 4/28/2025.  Signed by Soren Gan MD   [MC]   0835 Troponin I, High Sensitivity(!!): 52 [MC]   0846 CBC shows hemoglobin of 8.1.  CMP shows creatinine of 9.96 which is around his baseline.  Viral swabs negative. [MC]   0846 BNP: 47 [MC]   0859 Spoke with Rita from IR who will see this patient  [MC]   0904 Patient resting comfortably in bed.  He came to the emergency department because his cough was irritating him.  He satting 100% on room air in no acute distress.  He has decreased breath sounds on the right.  His chest x-ray shows large pleural effusion on the right.  He is agreeable to a thoracentesis.  IR will see him today.  He will be admitted to the hospital. 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  Patient has full range of motion of the neck without meningismus.  He satting well on room air.  Initially when he came in it says he was hypoxic with a pulse ox of 92 however after recheck it was 100%.  His initial blood pressure was recorded at 78/55 however on repeat it was 130/90.  He was given no medications in the ED.  His hemoglobin is 8.1 which is stable.  This is actually up from his last hemoglobin 11 days ago which was 7.8 at the time.  CMP shows chronic kidney changes.  His initial troponin was elevated at 50 2 repeat 46.  This is stable over the past 8 months.  It is actually lower than his previous troponins.  His BNP is normal.  His chest x-ray showed a right sided effusion that appears to be loculated.  Mediastinal shift to the right.  No changes from 4/28/2025.  He is resting comfortably in the room.  He is agreeable to thoracentesis.  I spoke with interventional radiology.  Patient will be admitted to the hospital for further evaluation and treatment.  I spoke with Dr. Maradiaga who accepts admission of this patient to the hospital.  Patient was also seen and evaluated by attending physician.      Differential diagnosis: Pleural effusion, new onset CHF, PE, viral syndrome, pneumonia, sepsis    Disposition/treatment  1.  See diagnosis        Procedure  Procedures       [1]   Past Medical History:  Diagnosis Date    Angiodysplasia of stomach and duodenum without bleeding     Gastric AV malformation    COVID-19     COVID-19 virus infection    Diverticulosis of intestine, part unspecified, without perforation or abscess without bleeding     Diverticulosis    End stage renal disease (Multi) 08/02/2021    ESRD (end stage renal disease)    Other hemorrhoids     Internal hemorrhoid    Personal history of diseases of the blood and blood-forming organs and certain  disorders involving the immune mechanism     History of anemia    Personal history of other diseases of the circulatory system     History of hypertension    Personal history of other diseases of the musculoskeletal system and connective tissue     History of gout    Personal history of other endocrine, nutritional and metabolic disease     History of obesity    Personal history of other medical treatment     History of blood transfusion    Residual hemorrhoidal skin tags     External hemorrhoid    Smoker     Ulcer of anus and rectum     Rectal ulcer    Unspecified osteoarthritis, unspecified site     Osteoarthritis   [2]   Past Surgical History:  Procedure Laterality Date    CT ABDOMEN PELVIS ANGIOGRAM W AND/OR WO IV CONTRAST  5/24/2023    CT ABDOMEN PELVIS ANGIOGRAM W AND/OR WO IV CONTRAST 5/24/2023 [facility] CT    IR VENOGRAM DIALYSIS  5/26/2023    IR VENOGRAM DIALYSIS 5/26/2023 [facility]    OTHER SURGICAL HISTORY  01/18/2022    Dialysis tunneled catheter placement    OTHER SURGICAL HISTORY  01/19/2022    Arteriovenous fistula creation procedure   [3] No family history on file.  [4]   Social History  Tobacco Use    Smoking status: Never    Smokeless tobacco: Never   Substance Use Topics    Alcohol use: Not Currently     Alcohol/week: 6.0 standard drinks of alcohol     Types: 6 Cans of beer per week     Comment: QUIT A MONTH AGO    Drug use: Never        Eliot Slade PA-C  05/09/25 0417

## 2025-05-11 LAB
BACTERIA FLD CULT: NORMAL
GRAM STN SPEC: NORMAL
GRAM STN SPEC: NORMAL

## 2025-05-12 ENCOUNTER — APPOINTMENT (OUTPATIENT)
Dept: PULMONOLOGY | Facility: HOSPITAL | Age: 50
End: 2025-05-12
Payer: COMMERCIAL

## 2025-05-12 LAB
ALBUMIN FLD-MCNC: 1.1 G/DL
AMYLASE FLD-CCNC: 22 U/L
BACTERIA FLD CULT: NORMAL
GLUCOSE FLD-MCNC: 50 MG/DL
GRAM STN SPEC: NORMAL
GRAM STN SPEC: NORMAL
LDH FLD L TO P-CCNC: 72 U/L
PROT FLD-MCNC: 1.9 G/DL

## 2025-05-22 ENCOUNTER — OFFICE VISIT (OUTPATIENT)
Dept: PULMONOLOGY | Facility: HOSPITAL | Age: 50
End: 2025-05-22
Payer: COMMERCIAL

## 2025-05-22 VITALS
DIASTOLIC BLOOD PRESSURE: 77 MMHG | TEMPERATURE: 97.2 F | WEIGHT: 166.7 LBS | SYSTOLIC BLOOD PRESSURE: 110 MMHG | BODY MASS INDEX: 26.11 KG/M2 | OXYGEN SATURATION: 95 % | HEART RATE: 78 BPM

## 2025-05-22 DIAGNOSIS — R19.7 DIARRHEA, UNSPECIFIED TYPE: ICD-10-CM

## 2025-05-22 DIAGNOSIS — J90 PLEURAL EFFUSION: Primary | ICD-10-CM

## 2025-05-22 PROCEDURE — 99204 OFFICE O/P NEW MOD 45 MIN: CPT | Performed by: NURSE PRACTITIONER

## 2025-05-22 PROCEDURE — 3074F SYST BP LT 130 MM HG: CPT | Performed by: NURSE PRACTITIONER

## 2025-05-22 PROCEDURE — 1036F TOBACCO NON-USER: CPT | Performed by: NURSE PRACTITIONER

## 2025-05-22 PROCEDURE — 3078F DIAST BP <80 MM HG: CPT | Performed by: NURSE PRACTITIONER

## 2025-05-22 PROCEDURE — 99214 OFFICE O/P EST MOD 30 MIN: CPT | Performed by: NURSE PRACTITIONER

## 2025-05-22 RX ORDER — ALBUTEROL SULFATE 90 UG/1
1 INHALANT RESPIRATORY (INHALATION) ONCE
OUTPATIENT
Start: 2025-05-22

## 2025-05-22 RX ORDER — ALBUTEROL SULFATE 0.83 MG/ML
3 SOLUTION RESPIRATORY (INHALATION) ONCE
OUTPATIENT
Start: 2025-05-22 | End: 2025-05-22

## 2025-05-22 ASSESSMENT — ENCOUNTER SYMPTOMS
ARTHRALGIAS: 0
WEAKNESS: 0
AGITATION: 0
JOINT SWELLING: 0
DIZZINESS: 0
FEVER: 0
DIARRHEA: 0
NUMBNESS: 0
VOMITING: 0
VOICE CHANGE: 0
BACK PAIN: 0
RHINORRHEA: 0
NERVOUS/ANXIOUS: 0
FATIGUE: 0
NAUSEA: 0
MYALGIAS: 0
SINUS PRESSURE: 0
ABDOMINAL PAIN: 0
PALPITATIONS: 0
HEADACHES: 0
EYE PAIN: 0

## 2025-05-22 ASSESSMENT — PAIN SCALES - GENERAL: PAINLEVEL_OUTOF10: 0-NO PAIN

## 2025-05-22 NOTE — PROGRESS NOTES
Patient: Soren Howard    [id number]  : 1975 -- AGE 49 y.o.    Provider: ROJAS Conti-CNP     Location [facility]   Service Date: 2025              [facility]  New Visit Note      HISTORY OF PRESENT ILLNESS     The patient's referring provider is: Pallavi Finch MD    HISTORY OF PRESENT ILLNESS   Soren Howard is a 49 y.o. male who presents to a [facility] for an evaluation with concerns of New Patient Visit (Pt had ct scan that should fluid on his lungs. ). I have independently interviewed and examined the patient in the office and reviewed available records.    Current History    On today's visit, the patient reports he went to ED for cold/ coughing a lot.  US guided thoracentesis 450ml. He states cough has resolved. He coughs here and there. He denies any wheezing, SOB at rest, SOB laying down flat, CP, or GERD. He has SEAMAN with walking long distances/ going up stairs. He feels this has been the same for some time.  He has occasional allergies.     Previous pulmonary history: He has no history of recurrent infections, or lung disease as a child.  He had no previous lung hx, never on oxygen or inhaler therapy.     Inhalers/nebulized medications: none     Hospitalization History: 2025 .    Sleep history: He has been told he snores - wakes up feeling rested. He will at times doze off.      ALLERGIES AND MEDICATIONS     ALLERGIES  Allergies[1]    MEDICATIONS  Current Medications[2]      PAST HISTORY     PAST MEDICAL HISTORY  - angiodysplasia of the stomach and duodenum   - AV malformation of colon   - ESRD - on dialysis   - GI bleed   - seizures     PAST SURGICAL HISTORY  Surgical History[3]    IMMUNIZATION HISTORY    There is no immunization history on file for this patient.    SOCIAL HISTORY  Smoking: none   Alcohol: 1 drink a week   Illicit drugs:  none     OCCUPATIONAL/ENVIRONMENTAL  HISTORY  Disabled - no occupational exposures     FAMILY HISTORY  Mother     RESULTS/DATA     Pulmonary Function Test Results     None on record     Chest Radiograph     2/5/24 - IMPRESSION:  No acute cardiopulmonary disease.    3/28/24 - [facility]  IMPRESSION:   1. Abnormal radiographic appearance of the chest with findings consistent with pulmonary edema, with also considered in the appropriate clinical setting.  Recommend radiographic follow-up in 4-6 weeks.     7/11/24 - Large right pleural effusion and small left pleural effusion noted.  Hazy opacities in the perihilar regions likely representing pulmonary  edema.    XR chest 1 view 05/09/2025  Impression  Worsening almost complete opacification of the right hemithorax due  to increasing pleural effusion and infiltrates/atelectatic changes.  Large right basilar lung mass can not be excluded. Correlate  clinically and follow-up as needed.    Signed by: Hao Mcduffie 5/9/2025 3:37 PM  Dictation workstation:   [id number]      No orders to display        Chest CT Scan     CT Chest: 1/12/25 -  IMPRESSION:  1. Interval development of moderate sized right pleural effusion with  pleural thickening, pleural calcifications, and significant  associated volume loss of the right lung, compared to the partially  visualized lower chest on 05/24/2023 CT. Findings are nonspecific,  for correlation with history of pleurodesis, pleuritis, or empyema in  the interval. Otherwise, underlying pleural or pulmonary neoplasm can  not be excluded.  2. Interval development/worsening of cardiomegaly compared to  partially visualized lower chest on 05/24/2023 CT with findings  suggestive of volume overload including diffuse abdominal wall edema  and mesenteric haziness. Interval increase in size of the spleen and  liver compared to 2023 CT which can be sequela of the cardiac  dysfunction, for correlation with liver function tests. However an  underlying lymphoproliferative disorder in the  "current clinical  setting can not be entirely excluded.  3. Atrophic bilateral native kidneys without evidence of  nephrolithiasis or hydronephrosis.  4. Severe diverticulosis of the colon without evidence of acute  diverticulitis or bowel obstruction.  5. Additional chronic findings as described above.     -- abdominal CT 5/2023 - LOWER CHEST: No consolidation or pleural effusion. The heart is   mildly enlarged. No pericardial effusion.     Echocardiogram     1/6/21 - The left ventricular systolic function is low normal with a 50-55% estimated ejection fraction.   2. Cannot r/o AL wall hypokinesis.   3. There are multiple wall motion abnormalities.         Other testing/ Labs      Eosinophils Absolute (x10*3/uL)   Date Value   05/09/2025 0.62   04/28/2025 0.45   01/07/2025 0.46     Eosinophils Absolute, Manual (x10*3/uL)   Date Value   07/23/2024 0.00     No results found for: \"IGE\"    Respiratory Culture  Lab Results   Component Value Date    GRAMSTAIN (2+) Few Polymorphonuclear leukocytes 05/09/2025    GRAMSTAIN No organisms seen 05/09/2025     No results found for the last 90 days.      Fungal Culture  No results found for: \"FUNGALCULTSM\", \"FUNGALSMEAR\"    AFB Culture  No results found for: \"AFBCX\", \"AFBSTAIN\"      REVIEW OF SYSTEMS     REVIEW OF SYSTEMS  Review of Systems   Constitutional:  Negative for fatigue and fever.   HENT:  Negative for congestion, postnasal drip, rhinorrhea, sinus pressure and voice change.    Eyes:  Negative for pain and visual disturbance.   Cardiovascular:  Negative for chest pain, palpitations and leg swelling.   Gastrointestinal:  Negative for abdominal pain, diarrhea, nausea and vomiting.   Endocrine: Negative for cold intolerance and heat intolerance.   Musculoskeletal:  Negative for arthralgias, back pain, joint swelling and myalgias.   Skin:  Negative for rash.   Neurological:  Negative for dizziness, weakness, numbness and headaches.   Psychiatric/Behavioral:  Negative for " agitation. The patient is not nervous/anxious.          PHYSICAL EXAM     VITAL SIGNS: /77   Pulse 78   Temp 36.2 °C (97.2 °F) (Temporal)   Wt 75.6 kg (166 lb 11.2 oz)   SpO2 95%   BMI 26.11 kg/m²      CURRENT WEIGHT: [unfilled]  BMI: [unfilled]  PREVIOUS WEIGHTS:  Wt Readings from Last 3 Encounters:   05/22/25 75.6 kg (166 lb 11.2 oz)   05/09/25 76.7 kg (169 lb)   04/28/25 77.1 kg (170 lb)       Physical Exam  Vitals reviewed.   Constitutional:       General: He is not in acute distress.     Appearance: Normal appearance. He is not ill-appearing or toxic-appearing.   HENT:      Head: Normocephalic.      Nose: No rhinorrhea.   Cardiovascular:      Rate and Rhythm: Normal rate and regular rhythm.      Heart sounds: Normal heart sounds.   Pulmonary:      Effort: Pulmonary effort is normal. No respiratory distress.      Breath sounds: Normal breath sounds. No stridor. No wheezing, rhonchi or rales.      Comments: Right base diminished   Abdominal:      General: Abdomen is flat.   Musculoskeletal:         General: Normal range of motion.      Right lower leg: No edema.      Left lower leg: No edema.   Skin:     General: Skin is warm and dry.      Nails: There is no clubbing.   Neurological:      General: No focal deficit present.      Mental Status: He is alert and oriented to person, place, and time.   Psychiatric:         Mood and Affect: Mood normal.         Behavior: Behavior normal.         Judgment: Judgment normal.       ASSESSMENT/PLAN     Pleural effusion: new from 2/2024 - 7/2024. S/p thoracentesis 5/9/25. Concern for pleural thickening vs rounded atelectasis. 93% lymphocytes.   - will get updated CT chest   - will get PFTs     Thank you for visiting the Pulmonary clinic today!   Return to clinic  1 month after CT/ PFT or sooner if needed   Maria Alejandra Wesley CNP  My office -  [phone]- Debbie is my . Dina is my nurse [phone].   Radiology scheduling [phone]   Appointment  scheduling [phone]   Pulmonary function testing - [phone]          -- Imaging/ case discussed with Dr. Story                [1]   Allergies  Allergen Reactions    Piperacillin-Tazobactam-Dextrs Hives   [2]   Current Outpatient Medications   Medication Sig Dispense Refill    acetaminophen (Tylenol) 325 mg tablet Take by mouth every 4 hours if needed.      amLODIPine (Norvasc) 10 mg tablet Take 1 tablet (10 mg) by mouth once daily. 30 tablet 7    carvedilol (Coreg) 25 mg tablet Take 1 tablet (25 mg) by mouth every 12 hours. 60 tablet 7    loperamide (Imodium) 2 mg capsule Take 2 capsules (4 mg) by mouth 2 times a day.      losartan (Cozaar) 25 mg tablet Take 1 tablet (25 mg) by mouth once daily. 30 tablet 7    calcium acetate (Phoslo) 667 mg capsule Take 3 capsules (2,000 mg) by mouth 3 times daily (morning, midday, late afternoon). (Patient not taking: Reported on 4/28/2025) 270 capsule 5    cholecalciferol (Vitamin D-3) 50 mcg (2,000 unit) capsule Take 1 capsule (50 mcg) by mouth early in the morning.. (Patient not taking: Reported on 4/28/2025) 60 capsule 4    ferrous sulfate, 325 mg ferrous sulfate, tablet Take 1 tablet (325 mg) by mouth 2 times a day. (Patient not taking: Reported on 4/28/2025) 60 tablet 8    hydrALAZINE (Apresoline) 100 mg tablet Take 1 tablet (100 mg) by mouth 2 times a day. 60 tablet 10    levETIRAcetam (Keppra) 500 mg tablet Take 1 tablet (500 mg) by mouth once daily. And one additional tablet after dialysis (Patient not taking: Reported on 4/28/2025) 30 tablet 1    omeprazole (PriLOSEC) 20 mg DR capsule Take 1 capsule (20 mg) by mouth once daily in the morning. Take before meals. Do not crush or chew. (Patient not taking: Reported on 4/28/2025) 30 capsule 11    sildenafil (Viagra) 50 mg tablet Take 1 tablet (50 mg) by mouth once daily as needed for erectile dysfunction. 30 tablet 1     No current facility-administered medications for this visit.   [3]   Past Surgical  History:  Procedure Laterality Date    CT ABDOMEN PELVIS ANGIOGRAM W AND/OR WO IV CONTRAST  5/24/2023    CT ABDOMEN PELVIS ANGIOGRAM W AND/OR WO IV CONTRAST 5/24/2023 [facility] CT    IR VENOGRAM DIALYSIS  5/26/2023    IR VENOGRAM DIALYSIS 5/26/2023 [facility]    OTHER SURGICAL HISTORY  01/18/2022    Dialysis tunneled catheter placement    OTHER SURGICAL HISTORY  01/19/2022    Arteriovenous fistula creation procedure

## 2025-05-22 NOTE — PATIENT INSTRUCTIONS
Pleural effusion: new from 2/2024 - 7/2024. S/p thoracentesis 5/9/25. Concern for pleural thickening vs rounded atelectasis. 93% lymphocytes.   - will get updated CT chest   - will get PFTs     Thank you for visiting the Pulmonary clinic today!   Return to clinic  1 month after CT/ PFT or sooner if needed   Maria Alejandra Wesley CNP  My office -  [phone]- Debbie is my . Dina is my nurse [phone].   Radiology scheduling [phone]   Appointment scheduling [phone]   Pulmonary function testing - [phone]

## 2025-06-16 NOTE — PROGRESS NOTES
Gastroenterology Clinic Note    History Of Present Illness  Soren Howard is a 50 y.o. male with a past medical history of ESRD sec to HTN, on HD since 3/2022 (Dr. 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     He follows with Dr. Pallavi Finch. Per her detailed notes:  Initially seen by me 12/2024 - had just had ER visit for hematochezia and acute anemia. He was transufed 2 units pRBCs for hgb 4 and incremented appropriately and was discharged from the ED.  More concerningly, he was having diarrhea with 18-20 Bms daily with no nocturnal stools for the lat 3-4 years. Had lost bout 100lbs in the same amount of time. No abdominal pain, was only using imodium PRN at that time     In the interim, we sent him for EGD/colonoscopy. EGD showed LA grade A esophagitis and gastritis, gastric bx negative for Hpylori. Colonoscopy was normal besides diverticlosis and hemorrhoids. No biopsies were taken. He had CT chest abdomen pelvis which showed moderate R pleural effusion with pleural thickening and pleural calcifications there were some faint/focal pancreatic calcifications noted.    Pt was last seen in March 2025. He was referred to pulm given lung findings. At the time, diarrhea was improving w/ loperamide. He was ordered for stool stuides, but these have not been collected. He underwent US guided thora of 450 ccs.     Today: breathing is okay. Diarrhea is about the same-having about 5 Bms per day, Bms are solid, not liquid stools. Taking loperamide 2 mg 4 tablets throughout hte day. No weight loss, has actually gained ~ 8 lbs. No blood in stool or black stools. He is not taking omeprazole- no GERD symptoms.  Does not take any fiber supplementation.     Medications:  Current  Medications[1]    Physical Exam  Vitals:    25 1446   BP: 124/82   Pulse: 75   Resp: 18   Temp: 36.1 °C (97 °F)   SpO2: 99%     Gen: well appearing, A+Ox3, in no acute distress  HEENT: PEERL, EOMI, sclera anicteric, no conjunctival injection, MMM, no oropharyngeal lesions  Resp: CTAB, normal breath sounds  CV: RRR, normal S1/S2  GI: non-tender, non-distended, normal BSs, no rebound or guarding, no masses palpable, no HSM  MSK: warm and well perfused, no edema  Neuro: A+Ox3, no focal deficits   Skin: warm and dry, no lesions, no rashes     Relevant Results  Lab Results   Component Value Date    WBC 11.8 (H) 2025    HGB 7.8 (L) 2025    HCT 25.4 (L) 2025    MCV 97 2025     2025     Lab Results   Component Value Date    GLUCOSE 89 2025    CALCIUM 7.8 (L) 2025     2025    K 3.7 2025    CO2 24 2025     2025    BUN 40 (H) 2025    CREATININE 10.19 (H) 2025     Lab Results   Component Value Date    ALT 6 (L) 2025    AST 9 2025    ALKPHOS 55 2025    BILITOT 0.2 2025     Imagin2025 CT abdomen pelvis  IMPRESSION:  1. Interval development of moderate sized right pleural effusion with  pleural thickening, pleural calcifications, and significant  associated volume loss of the right lung, compared to the partially  visualized lower chest on 2023 CT. Findings are nonspecific,  for correlation with history of pleurodesis, pleuritis, or empyema in  the interval. Otherwise, underlying pleural or pulmonary neoplasm can  not be excluded.  2. Interval development/worsening of cardiomegaly compared to  partially visualized lower chest on 2023 CT with findings  suggestive of volume overload including diffuse abdominal wall edema  and mesenteric haziness. Interval increase in size of the spleen and  liver compared to  CT which can be sequela of the cardiac  dysfunction, for correlation with  liver function tests. However an  underlying lymphoproliferative disorder in the current clinical  setting can not be entirely excluded.  3. Atrophic bilateral native kidneys without evidence of  nephrolithiasis or hydronephrosis.  4. Severe diverticulosis of the colon without evidence of acute  diverticulitis or bowel obstruction.  5. Additional chronic findings as described above.    Procedures:  2/28/2025 EGD  Impression  Grade A esophagitis in the lower third of the esophagus  Mild erythematous, granular mucosa in the body of the stomach and antrum, consistent with gastritis; performed cold forceps biopsy to rule out H. pylori  Otherwise normal upper endoscopy.        Findings  Regular Z-line 40 cm from the incisors  Grade A esophagitis with mucosal breaks measuring less than 5 mm not continuous between folds, covering less than 75% of the circumference in the lower third of the esophagus  Mild, generalized erythematous and granular mucosa in the body of the stomach and antrum, consistent with gastritis; performed cold forceps biopsy to rule out H. pylori. Placed in Jar 1  All observed locations otherwise appeared normal.        2/28/2025 Colonoscopy  Impression  Scattered diverticulosis of severe severity  Small (grade 1) hemorrhoids  Otherwise normal ileocolonoscopy.        Findings  Multiple medium and large, scattered pancolonic severe diverticula with no inflammation containing no content; no bleeding was observed  Internal small (grade 1) hemorrhoids  All observed locations otherwise appeared normal.        Component     FINAL DIAGNOSIS   Stomach, biopsy:  - Mild chronic nonspecific gastritis, no Helicobacter identified.      ASSESSMENT/PLAN:  Soren Howard is a 50 y.o. male with a past medical history of ESRD sec to HTN, on HD since 3/2022 (Dr. Charles, [facility]SF, [facility], [facility]), h/o recurrent bleeding colonic AVMs s/p APC (and nonbleeding gastric AVMs), h/o seizures 2023, depression, prior LA grade C  esophagitis now off PPI, and recurrent anemia who presents to GI clinic for acute on chronic anemia, diarrhea, and unintentional weight loss     We performed EGD/colonoscopy that did not show any malignancy. CT chest AP as part of his weight loss work up and found concerning R chest findings as above. Is now following with pulm.      Regarding his diarrhea, symptomatically it is improved with loperamide. He is now having 5-6 solid Bms per day (from 18-20 liquid). We performed colonoscopy which was visually normal but we do not have random colon biopsies. Still do not have clear etiology for his diarrhea so will plan for stool studies as below, which have not yet been completed. Given solid, but frequent stooling, will also trial fiber supplementation.      Plan:  -Send fecal calprotectin, fecal elastase, stool O&P, giardia/cryptosporidum, stool electrolytes  -Take loperamide 2mg two capsules BID  - start fiber supplementation (metamucil once per day to start, patient has to limit water intake as he is on iHD)  - RTC 2-3 months with Dr. Stef Patel seen and discussed with Dr. Hernandez.     Juliana Webster MD         [1]   Current Outpatient Medications:     acetaminophen (Tylenol) 325 mg tablet, Take by mouth every 4 hours if needed., Disp: , Rfl:     amLODIPine (Norvasc) 10 mg tablet, Take 1 tablet (10 mg) by mouth once daily., Disp: 30 tablet, Rfl: 7    carvedilol (Coreg) 25 mg tablet, Take 1 tablet (25 mg) by mouth every 12 hours., Disp: 60 tablet, Rfl: 7    hydrALAZINE (Apresoline) 100 mg tablet, Take 1 tablet (100 mg) by mouth 2 times a day., Disp: 60 tablet, Rfl: 10    loperamide (Imodium) 2 mg capsule, Take 2 capsules (4 mg) by mouth 2 times a day., Disp: , Rfl:     losartan (Cozaar) 25 mg tablet, Take 1 tablet (25 mg) by mouth once daily., Disp: 30 tablet, Rfl: 7    sildenafil (Viagra) 50 mg tablet, Take 1 tablet (50 mg) by mouth once daily as needed for erectile dysfunction., Disp: 30 tablet, Rfl: 1     psyllium (Metamucil) powder, Take 1 Dose (5.8 g) by mouth once daily., Disp: 283 g, Rfl: 12  No current facility-administered medications for this visit.

## 2025-06-19 ENCOUNTER — HOSPITAL ENCOUNTER (OUTPATIENT)
Dept: RADIOLOGY | Facility: HOSPITAL | Age: 50
Discharge: HOME | End: 2025-06-19
Payer: COMMERCIAL

## 2025-06-19 ENCOUNTER — OFFICE VISIT (OUTPATIENT)
Dept: GASTROENTEROLOGY | Facility: HOSPITAL | Age: 50
End: 2025-06-19
Payer: COMMERCIAL

## 2025-06-19 ENCOUNTER — HOSPITAL ENCOUNTER (OUTPATIENT)
Dept: RESPIRATORY THERAPY | Facility: HOSPITAL | Age: 50
Discharge: HOME | End: 2025-06-19
Payer: COMMERCIAL

## 2025-06-19 VITALS
WEIGHT: 168.4 LBS | TEMPERATURE: 97 F | OXYGEN SATURATION: 99 % | BODY MASS INDEX: 26.38 KG/M2 | SYSTOLIC BLOOD PRESSURE: 124 MMHG | HEART RATE: 75 BPM | RESPIRATION RATE: 18 BRPM | DIASTOLIC BLOOD PRESSURE: 82 MMHG

## 2025-06-19 DIAGNOSIS — J90 PLEURAL EFFUSION: ICD-10-CM

## 2025-06-19 DIAGNOSIS — R19.7 DIARRHEA, UNSPECIFIED TYPE: ICD-10-CM

## 2025-06-19 LAB
MGC ASCENT PFT - FEV1 - POST: 1.22
MGC ASCENT PFT - FEV1 - PRE: 1.32
MGC ASCENT PFT - FEV1 - PREDICTED: 3.18
MGC ASCENT PFT - FVC - POST: 1.24
MGC ASCENT PFT - FVC - PRE: 1.34
MGC ASCENT PFT - FVC - PREDICTED: 3.91

## 2025-06-19 PROCEDURE — 94726 PLETHYSMOGRAPHY LUNG VOLUMES: CPT

## 2025-06-19 PROCEDURE — 99212 OFFICE O/P EST SF 10 MIN: CPT | Mod: 25 | Performed by: STUDENT IN AN ORGANIZED HEALTH CARE EDUCATION/TRAINING PROGRAM

## 2025-06-19 PROCEDURE — 2500000001 HC RX 250 WO HCPCS SELF ADMINISTERED DRUGS (ALT 637 FOR MEDICARE OP): Mod: SE | Performed by: NURSE PRACTITIONER

## 2025-06-19 PROCEDURE — 71250 CT THORAX DX C-: CPT

## 2025-06-19 PROCEDURE — 71250 CT THORAX DX C-: CPT | Performed by: RADIOLOGY

## 2025-06-19 RX ORDER — ALBUTEROL SULFATE 90 UG/1
1 INHALANT RESPIRATORY (INHALATION) ONCE
Status: COMPLETED | OUTPATIENT
Start: 2025-06-19 | End: 2025-06-19

## 2025-06-19 RX ORDER — ALBUTEROL SULFATE 0.83 MG/ML
3 SOLUTION RESPIRATORY (INHALATION) ONCE
Status: COMPLETED | OUTPATIENT
Start: 2025-06-19 | End: 2025-06-19

## 2025-06-19 RX ADMIN — ALBUTEROL SULFATE 4 PUFF: 90 AEROSOL, METERED RESPIRATORY (INHALATION) at 14:08

## 2025-06-19 ASSESSMENT — PAIN SCALES - GENERAL: PAINLEVEL_OUTOF10: 0-NO PAIN

## 2025-07-10 ENCOUNTER — OFFICE VISIT (OUTPATIENT)
Dept: PULMONOLOGY | Facility: HOSPITAL | Age: 50
End: 2025-07-10
Payer: COMMERCIAL

## 2025-07-10 VITALS
SYSTOLIC BLOOD PRESSURE: 136 MMHG | HEART RATE: 108 BPM | OXYGEN SATURATION: 98 % | DIASTOLIC BLOOD PRESSURE: 94 MMHG | WEIGHT: 157 LBS | TEMPERATURE: 97.6 F | BODY MASS INDEX: 24.59 KG/M2

## 2025-07-10 DIAGNOSIS — J90 PLEURAL EFFUSION: Primary | ICD-10-CM

## 2025-07-10 DIAGNOSIS — J32.9 SINUSITIS, UNSPECIFIED CHRONICITY, UNSPECIFIED LOCATION: ICD-10-CM

## 2025-07-10 PROCEDURE — 99214 OFFICE O/P EST MOD 30 MIN: CPT | Performed by: NURSE PRACTITIONER

## 2025-07-10 PROCEDURE — 1036F TOBACCO NON-USER: CPT | Performed by: NURSE PRACTITIONER

## 2025-07-10 PROCEDURE — 3080F DIAST BP >= 90 MM HG: CPT | Performed by: NURSE PRACTITIONER

## 2025-07-10 PROCEDURE — 3075F SYST BP GE 130 - 139MM HG: CPT | Performed by: NURSE PRACTITIONER

## 2025-07-10 RX ORDER — FLUTICASONE PROPIONATE 50 MCG
SPRAY, SUSPENSION (ML) NASAL
COMMUNITY
Start: 2025-06-28

## 2025-07-10 RX ORDER — CEFDINIR 300 MG/1
300 CAPSULE ORAL 2 TIMES DAILY
Qty: 20 CAPSULE | Refills: 0 | Status: SHIPPED | OUTPATIENT
Start: 2025-07-10 | End: 2025-07-20

## 2025-07-10 RX ORDER — PREDNISONE 20 MG/1
1 TABLET ORAL
COMMUNITY
Start: 2025-05-02

## 2025-07-10 RX ORDER — HYDRALAZINE HYDROCHLORIDE 100 MG/1
TABLET, FILM COATED ORAL 2 TIMES DAILY
COMMUNITY

## 2025-07-10 RX ORDER — CARVEDILOL 12.5 MG/1
TABLET ORAL 2 TIMES DAILY
COMMUNITY
Start: 2022-03-16

## 2025-07-10 RX ORDER — CLINDAMYCIN HYDROCHLORIDE 150 MG/1
300 CAPSULE ORAL
COMMUNITY
Start: 2025-07-03

## 2025-07-10 RX ORDER — TORSEMIDE 20 MG/1
1 TABLET ORAL DAILY
COMMUNITY
Start: 2022-03-04

## 2025-07-10 RX ORDER — AZITHROMYCIN 250 MG/1
TABLET, FILM COATED ORAL
COMMUNITY
Start: 2025-06-28

## 2025-07-10 RX ORDER — PSEUDOEPHEDRINE HCL 30 MG
30 TABLET ORAL EVERY 6 HOURS PRN
COMMUNITY
Start: 2025-07-03

## 2025-07-10 RX ORDER — TADALAFIL 10 MG/1
TABLET ORAL
COMMUNITY

## 2025-07-10 RX ORDER — POLYETHYLENE GLYCOL 3350, SODIUM SULFATE ANHYDROUS, SODIUM BICARBONATE, SODIUM CHLORIDE, POTASSIUM CHLORIDE 236; 22.74; 6.74; 5.86; 2.97 G/4L; G/4L; G/4L; G/4L; G/4L
POWDER, FOR SOLUTION ORAL
COMMUNITY
Start: 2024-12-14

## 2025-07-10 RX ORDER — SILDENAFIL 25 MG/1
1 TABLET, FILM COATED ORAL DAILY PRN
COMMUNITY
Start: 2022-02-01

## 2025-07-10 RX ORDER — MECLIZINE HCL 12.5 MG 12.5 MG/1
12.5 TABLET ORAL EVERY 8 HOURS PRN
COMMUNITY
Start: 2025-05-02

## 2025-07-10 ASSESSMENT — ENCOUNTER SYMPTOMS
FATIGUE: 0
NAUSEA: 0
RHINORRHEA: 0
DIARRHEA: 0
JOINT SWELLING: 0
PALPITATIONS: 0
SINUS PRESSURE: 0
VOICE CHANGE: 0
HEADACHES: 0
BACK PAIN: 0
NERVOUS/ANXIOUS: 0
AGITATION: 0
NUMBNESS: 0
WEAKNESS: 0
EYE PAIN: 0
ARTHRALGIAS: 0
VOMITING: 0
ABDOMINAL PAIN: 0
DIZZINESS: 0
FEVER: 0
MYALGIAS: 0

## 2025-07-10 ASSESSMENT — PAIN SCALES - GENERAL: PAINLEVEL_OUTOF10: 0-NO PAIN

## 2025-07-10 NOTE — PROGRESS NOTES
Patient: Soren Howard    [id number]  : 1975 -- AGE 50 y.o.    Provider: ROJAS Conti-CNP     Location [facility]   Service Date: 7/10/2025              [facility]  Follow up Visit Note      HISTORY OF PRESENT ILLNESS     The patient's referring provider is: No ref. provider found    HISTORY OF PRESENT ILLNESS   Soren Howard is a 50 y.o. male who presents to a [facility] for an evaluation with concerns of FUV. I have independently interviewed and examined the patient in the office and reviewed available records.    Current History    Since last visit he has been doing ok. He denies any cough, wheezing, SOB at rest or CP. He has issues with his ears - have been popping. He has some nasal congestion. He has noticed some SEAMAN. He has been taking antibiotics - finished azithromycin. [facility] - has been taking since last Thursday, but has not noticed much improvement. He also got sudafed - that has not helped either.  He has an appt on  - soonest ENT appt he could get. He states lying down his ears won't pop. He denies any GERD. He states he has sensation he has to urinate, but does not urinate much related to being on dialysis. He has had urinary urgency for the last 2 days.     25: On today's visit, the patient reports he went to ED for cold/ coughing a lot.  US guided thoracentesis 450ml. He states cough has resolved. He coughs here and there. He denies any wheezing, SOB at rest, SOB laying down flat, CP, or GERD. He has SEAMAN with walking long distances/ going up stairs. He feels this has been the same for some time.  He has occasional allergies.     Previous pulmonary history: He has no history of recurrent infections, or lung disease as a child.  He had no previous lung hx, never on oxygen or inhaler therapy.     Inhalers/nebulized medications:  none     Hospitalization History: 4/2025 .    Sleep history: He has been told he snores - wakes up feeling rested. He will at times doze off.      ALLERGIES AND MEDICATIONS     ALLERGIES  Allergies[1]    MEDICATIONS  Current Medications[2]      PAST HISTORY     PAST MEDICAL HISTORY  - angiodysplasia of the stomach and duodenum   - AV malformation of colon   - ESRD - on dialysis   - GI bleed   - seizures     PAST SURGICAL HISTORY  Surgical History[3]    IMMUNIZATION HISTORY    There is no immunization history on file for this patient.    SOCIAL HISTORY  Smoking: none   Alcohol: 1 drink a week   Illicit drugs:  none     OCCUPATIONAL/ENVIRONMENTAL HISTORY  Disabled - no occupational exposures     FAMILY HISTORY  Mother     RESULTS/DATA     Pulmonary Function Test Results     6/19/25 - FEV1/FVC 0.98 (z 3.3)/ FEV1 1.22 (38%- no BD resp)/ FVC 1.24 (31%)/ TLC 3.66 (59%)/ unable to complete DLCO     Chest Radiograph     2/5/24 - IMPRESSION:  No acute cardiopulmonary disease.    3/28/24 - [facility]  IMPRESSION:   1. Abnormal radiographic appearance of the chest with findings consistent with pulmonary edema, with also considered in the appropriate clinical setting.  Recommend radiographic follow-up in 4-6 weeks.     7/11/24 - Large right pleural effusion and small left pleural effusion noted.  Hazy opacities in the perihilar regions likely representing pulmonary  edema.    XR chest 1 view 05/09/2025  Impression  Worsening almost complete opacification of the right hemithorax due  to increasing pleural effusion and infiltrates/atelectatic changes.  Large right basilar lung mass can not be excluded. Correlate  clinically and follow-up as needed.    Signed by: Hao Mcduffie 5/9/2025 3:37 PM  Dictation workstation:   [id number]      No orders to display        Chest CT Scan     CT Chest: 1/12/25 -  IMPRESSION:  1. Interval development of moderate sized right pleural effusion with  pleural thickening, pleural calcifications, and  significant  associated volume loss of the right lung, compared to the partially  visualized lower chest on 05/24/2023 CT. Findings are nonspecific,  for correlation with history of pleurodesis, pleuritis, or empyema in  the interval. Otherwise, underlying pleural or pulmonary neoplasm can  not be excluded.  2. Interval development/worsening of cardiomegaly compared to  partially visualized lower chest on 05/24/2023 CT with findings  suggestive of volume overload including diffuse abdominal wall edema  and mesenteric haziness. Interval increase in size of the spleen and  liver compared to 2023 CT which can be sequela of the cardiac  dysfunction, for correlation with liver function tests. However an  underlying lymphoproliferative disorder in the current clinical  setting can not be entirely excluded.  3. Atrophic bilateral native kidneys without evidence of  nephrolithiasis or hydronephrosis.  4. Severe diverticulosis of the colon without evidence of acute  diverticulitis or bowel obstruction.  5. Additional chronic findings as described above.    6/19/25: Stable moderate right-sided pleural effusion with thickening of  the surrounding pleura. Given patient's lymphocyte-predominant  thoracentesis results, findings consistent with an  inflammatory/reactive pleuritis. Correlate with pleural fluid  sampling.  2. Similar rounded atelectasis throughout the right lower lobe with  convergence of the bronchovascular bundles towards the right hilum,  lung volume loss and rightward shifting of the mediastinum.  3. Atrophic appearance of the left kidney to be correlated with  underlying chronic kidney disease.  4. Colonic diverticulosis without diverticulitis.  5. Additional incidental non-acute findings as detailed above.     -- abdominal CT 5/2023 - LOWER CHEST: No consolidation or pleural effusion. The heart is   mildly enlarged. No pericardial effusion.     Echocardiogram     1/6/21 - The left ventricular systolic function  "is low normal with a 50-55% estimated ejection fraction.   2. Cannot r/o AL wall hypokinesis.   3. There are multiple wall motion abnormalities.         Other testing/ Labs      Eosinophils Absolute (x10*3/uL)   Date Value   05/09/2025 0.62   04/28/2025 0.45   01/07/2025 0.46     Eosinophils Absolute, Manual (x10*3/uL)   Date Value   07/23/2024 0.00     No results found for: \"IGE\"    Respiratory Culture  Lab Results   Component Value Date    GRAMSTAIN (2+) Few Polymorphonuclear leukocytes 05/09/2025    GRAMSTAIN No organisms seen 05/09/2025     No results found for the last 90 days.      Fungal Culture  No results found for: \"FUNGALCULTSM\", \"FUNGALSMEAR\"    AFB Culture  No results found for: \"AFBCX\", \"AFBSTAIN\"      REVIEW OF SYSTEMS     REVIEW OF SYSTEMS  Review of Systems   Constitutional:  Negative for fatigue and fever.   HENT:  Negative for congestion, postnasal drip, rhinorrhea, sinus pressure and voice change.    Eyes:  Negative for pain and visual disturbance.   Cardiovascular:  Negative for chest pain, palpitations and leg swelling.   Gastrointestinal:  Negative for abdominal pain, diarrhea, nausea and vomiting.   Endocrine: Negative for cold intolerance and heat intolerance.   Musculoskeletal:  Negative for arthralgias, back pain, joint swelling and myalgias.   Skin:  Negative for rash.   Neurological:  Negative for dizziness, weakness, numbness and headaches.   Psychiatric/Behavioral:  Negative for agitation. The patient is not nervous/anxious.          PHYSICAL EXAM     VITAL SIGNS: BP (!) 136/94   Pulse 108   Temp 36.4 °C (97.6 °F)   Wt 71.2 kg (157 lb)   SpO2 98%   BMI 24.59 kg/m²      CURRENT WEIGHT: [unfilled]  BMI: [unfilled]  PREVIOUS WEIGHTS:  Wt Readings from Last 3 Encounters:   07/10/25 71.2 kg (157 lb)   06/19/25 76.4 kg (168 lb 6.4 oz)   05/22/25 75.6 kg (166 lb 11.2 oz)       Physical Exam  Vitals reviewed.   Constitutional:       General: He is not in acute distress.     Appearance: Normal " appearance. He is not ill-appearing or toxic-appearing.   HENT:      Head: Normocephalic.      Nose: No rhinorrhea.   Cardiovascular:      Rate and Rhythm: Normal rate and regular rhythm.      Heart sounds: Normal heart sounds.   Pulmonary:      Effort: Pulmonary effort is normal. No respiratory distress.      Breath sounds: Normal breath sounds. No stridor. No wheezing, rhonchi or rales.   Abdominal:      General: Abdomen is flat.   Musculoskeletal:         General: Normal range of motion.      Right lower leg: No edema.      Left lower leg: No edema.   Skin:     General: Skin is warm and dry.      Nails: There is no clubbing.   Neurological:      General: No focal deficit present.      Mental Status: He is alert and oriented to person, place, and time.   Psychiatric:         Mood and Affect: Mood normal.         Behavior: Behavior normal.         Judgment: Judgment normal.         ASSESSMENT/PLAN     Pleural effusion: new from 2/2024 - 7/2024. S/p thoracentesis 5/9/25. Concern for pleural thickening vs rounded atelectasis. 93% lymphocytes. Likely had pneumonia with parapneumonic effusion. PFTs restricted. CT reviewed with Dr. Nicholson.   - referral to thoracic surgery      2. Ear fullness/ popping and urinary frequency: s/p azithromycin and clindamycin   - stop clindamycin   - start cefdinir course   - discussed making Dr. Charles his nephrologist aware of- his symptoms     Thank you for visiting the Pulmonary clinic today!   Return to clinic  - as needed   Maria Alejandra Wesley CNP  My office -  [phone]- Debbie is my . Dina is my nurse [phone].   Radiology scheduling [phone]   Appointment scheduling [phone]   Pulmonary function testing - [phone]                    [1]   Allergies  Allergen Reactions    Piperacillin-Tazobactam-Dextrs Hives   [2]   Current Outpatient Medications   Medication Sig Dispense Refill    azithromycin (Zithromax) 250 mg tablet TAKE 2 TABLETS BY  MOUTH ON DAY 1, AND THEN TAKE 1 TABLET BY MOUTH ONCE A DAY ON DAY 2 THROUGH DAY 5      carvedilol (Coreg) 12.5 mg tablet Take by mouth twice a day.      clindamycin (Cleocin) 150 mg capsule Take 2 capsules (300 mg) by mouth.      fluticasone (Flonase) 50 mcg/actuation nasal spray INSTILL 1 SPRAY IN EACH NOSTRIL BY THE NASAL ROUTE TWICE DAILY IN THE MORNING AND NIGHT FOR ONE WEEK FOR RUNNING NOSE AND NASAL CONGESTION      meclizine (Antivert) 12.5 mg tablet Take 1 tablet (12.5 mg) by mouth every 8 hours if needed for dizziness.      polyethylene glycol (GoLYTELY) 236-22.74-6.74 -5.86 gram solution       predniSONE (Deltasone) 20 mg tablet Take 1 tablet (20 mg) by mouth early in the morning..      pseudoephedrine (Sudafed) 30 mg tablet Take 1 tablet (30 mg) by mouth every 6 hours if needed for congestion.      sildenafil (Viagra) 25 mg tablet Take 1 tablet (25 mg) by mouth once daily as needed.      torsemide (Demadex) 20 mg tablet Take 1 tablet (20 mg) by mouth once daily.      acetaminophen (Tylenol) 325 mg tablet Take by mouth every 4 hours if needed.      amLODIPine (Norvasc) 10 mg tablet Take 1 tablet (10 mg) by mouth once daily. 30 tablet 7    carvedilol (Coreg) 25 mg tablet Take 1 tablet (25 mg) by mouth every 12 hours. 60 tablet 7    hydrALAZINE (Apresoline) 100 mg tablet Take 1 tablet (100 mg) by mouth 2 times a day. 60 tablet 10    hydrALAZINE (Apresoline) 100 mg tablet Take by mouth twice a day.      loperamide (Imodium) 2 mg capsule Take 2 capsules (4 mg) by mouth 2 times a day.      losartan (Cozaar) 25 mg tablet Take 1 tablet (25 mg) by mouth once daily. 30 tablet 7    psyllium (Metamucil) powder Take 1 Dose (5.8 g) by mouth once daily. 283 g 12    sildenafil (Viagra) 50 mg tablet Take 1 tablet (50 mg) by mouth once daily as needed for erectile dysfunction. 30 tablet 1    tadalafil (Cialis) 10 mg tablet Take by mouth.       No current facility-administered medications for this visit.   [3]   Past  Surgical History:  Procedure Laterality Date    CT ABDOMEN PELVIS ANGIOGRAM W AND/OR WO IV CONTRAST  5/24/2023    CT ABDOMEN PELVIS ANGIOGRAM W AND/OR WO IV CONTRAST 5/24/2023 [facility] CT    IR VENOGRAM DIALYSIS  5/26/2023    IR VENOGRAM DIALYSIS 5/26/2023 [facility] ANGIO    OTHER SURGICAL HISTORY  01/18/2022    Dialysis tunneled catheter placement    OTHER SURGICAL HISTORY  01/19/2022    Arteriovenous fistula creation procedure

## 2025-07-10 NOTE — PATIENT INSTRUCTIONS
Pleural effusion: new from 2/2024 - 7/2024. S/p thoracentesis 5/9/25. Concern for pleural thickening vs rounded atelectasis. 93% lymphocytes. Likely had pneumonia with parapneumonic effusion. PFTs restricted. CT reviewed with Dr. Nicholson.   - referral to thoracic surgery      2. Ear fullness/ popping and urinary frequency: s/p azithromycin and clindamycin   - stop clindamycin   - start cefdinir course     Thank you for visiting the Pulmonary clinic today!   Return to clinic  - as needed   Maria Alejandra Wesley CNP  My office -  [phone]- Debbie is my . Dina is my nurse [phone].   Radiology scheduling [phone]   Appointment scheduling [phone]   Pulmonary function testing - [phone]

## 2025-07-11 LAB
CRYPTOSP AG STL QL IA: NORMAL
G LAMBLIA AG STL QL IA: NORMAL
O+P STL CONC: NORMAL
O+P STL TRI STN: NORMAL

## 2025-07-15 LAB
CALPROTECTIN STL-MCNT: NORMAL UG/G
CHLORIDE STL-SCNC: 14 MEQ/L
CRYPTOSP AG STL QL IA: NORMAL
ELASTASE PANC STL-MCNT: NORMAL UG/G
G LAMBLIA AG STL QL IA: NORMAL
O+P STL TRI STN: NORMAL
POTASSIUM STL-SCNC: 35.7 MEQ/L
SODIUM STL-SCNC: NORMAL MMOL/L

## 2025-07-23 ENCOUNTER — APPOINTMENT (OUTPATIENT)
Dept: OTOLARYNGOLOGY | Facility: CLINIC | Age: 50
End: 2025-07-23
Payer: COMMERCIAL

## 2025-07-23 VITALS — WEIGHT: 163 LBS | BODY MASS INDEX: 25.53 KG/M2

## 2025-07-23 DIAGNOSIS — H69.90 DYSFUNCTION OF EUSTACHIAN TUBE, UNSPECIFIED LATERALITY: Primary | ICD-10-CM

## 2025-07-23 DIAGNOSIS — J31.0 CHRONIC RHINITIS: ICD-10-CM

## 2025-07-23 PROCEDURE — G8433 SCR FOR DEP NOT CPT DOC RSN: HCPCS | Performed by: GENERAL PRACTICE

## 2025-07-23 PROCEDURE — 92504 EAR MICROSCOPY EXAMINATION: CPT | Performed by: GENERAL PRACTICE

## 2025-07-23 PROCEDURE — 99203 OFFICE O/P NEW LOW 30 MIN: CPT | Performed by: GENERAL PRACTICE

## 2025-07-23 PROCEDURE — 1036F TOBACCO NON-USER: CPT | Performed by: GENERAL PRACTICE

## 2025-07-23 RX ORDER — TRIAMCINOLONE ACETONIDE 55 UG/1
2 SPRAY, METERED NASAL DAILY
Qty: 16.5 G | Refills: 3 | Status: SHIPPED | OUTPATIENT
Start: 2025-07-23 | End: 2026-07-23

## 2025-07-23 RX ORDER — CETIRIZINE HYDROCHLORIDE 10 MG/1
10 TABLET ORAL DAILY PRN
Qty: 30 TABLET | Refills: 2 | Status: SHIPPED | OUTPATIENT
Start: 2025-07-23 | End: 2026-07-23

## 2025-07-23 NOTE — PROGRESS NOTES
Otolaryngology - Head and Neck Surgery Outpatient New Patient Visit Note        Assessment/Plan:   Problem List Items Addressed This Visit    None  Visit Diagnoses         Codes      Dysfunction of Eustachian tube, unspecified laterality    -  Primary H69.90    Relevant Medications    triamcinolone (Nasacort) 55 mcg nasal inhaler    cetirizine (ZyrTEC) 10 mg tablet    Other Relevant Orders    Referral to Audiology      Chronic rhinitis     J31.0            50yoM with ETD symptoms without ongoing otitis on exam.    Discussed controls for rhinitis to aid in relief.           Follow up:  -plan for follow up in clinic as needed    All of the above findings, impressions, treatment planning and follow up plans were discussed with the patient who indicated understanding.  the patient was instructed to contact or return to clinic sooner if symptoms/signs persist or worsen despite the above management.      Austyn Foster MD  Otolaryngology - Head and Neck Surgery            History Of Present Illness  Soren Howard is a 50 y.o. male presenting for concerns for recent ear fullness/pressure and popping  Reports ongoing symptoms over months.  Seen and treated with flonase and multiple antibiotics over time without relief.     No ongoing otalgia, otorrhea.     Reports rare prior otitis history    Notes a history of allergic rhinitis, but no recent sinusitis concerns.   No pharyngitis or reflux symptoms.     Reports previously having fluid noted in ears in recent months.             Past Medical History  He has a past medical history of Angiodysplasia of stomach and duodenum without bleeding, COVID-19, Diverticulosis of intestine, part unspecified, without perforation or abscess without bleeding, End stage renal disease (Multi) (08/02/2021), Other hemorrhoids, Personal history of diseases of the blood and blood-forming organs and certain disorders involving the immune mechanism, Personal history of other diseases of the circulatory  system, Personal history of other diseases of the musculoskeletal system and connective tissue, Personal history of other endocrine, nutritional and metabolic disease, Personal history of other medical treatment, Residual hemorrhoidal skin tags, Smoker, Ulcer of anus and rectum, and Unspecified osteoarthritis, unspecified site.    Surgical History  He has a past surgical history that includes Other surgical history (01/18/2022); Other surgical history (01/19/2022); CT angio abdomen pelvis w and or wo IV IV contrast (5/24/2023); and IR venogram dialysis (5/26/2023).     Social History  He reports that he has never smoked. He has never used smokeless tobacco. He reports that he does not currently use alcohol. He reports that he does not use drugs.    Family History  Family History[1]     Allergies  Piperacillin-tazobactam-dextrs    Review of Systems  ROS: Pertinent positives as noted in HPI.    - CONSTITUTIONAL: Does not report weight loss, fever or chills.    - HEENT:   Ear: Does not report tinnitus, vertigo,    , otalgia, otorrhea  Nose: Does not report  ,  , epistaxis, decreased smell  Throat: Does not report pain, dysphagia, odynophagia  Larynx: Does not report hoarseness,  difficulty breathing, pain with speaking (odynophonia)  Neck: Does not report new masses, pain, swelling  Face: Does not report sinus pain, pressure, swelling, numbness, weakness     - RESPIRATORY: Does not report SOB or cough.    - CV: Does not report palpitations or chest pain.     - GI: Does not report abdominal pain, nausea, vomiting or diarrhea.    - : Does not report dysuria or urinary frequency.    - MSK: Does not report myalgia or joint pain.    - SKIN: Does not report rash or pruritus.    - NEUROLOGICAL: Does not report headache or syncope.    - PSYCHIATRIC: Does not report recent changes in mood. Does not report anxiety or depression.         Physical Exam:     GENERAL:   Alert & Oriented to person, place and time; Normal affect and  appearance. Well developed and well nourished. Conversant & cooperative with examination.     HEAD:   Normocephalic, atraumatic. No sinus tenderness to palpation. Normal parotid bilaterally. Normal facial strength.     NEUROLOGIC:   Cranial nerves II-XII grossly intact, gait WNL. Normal mood and affect.    EYES:   Extraocular movements intact. Pupils equal, round, reactive to light and accommodation. No nystagmus, no ptosis. no scleral injection.    EAR:   Normal auricle. No discomfort or TTP with manipulation.   Handheld otoscopic exam showed normal external auditory canals bilaterally. No purulence or EAC inflammation. Minimal cerumen.   Right tympanic membrane clear and mobile without evidence of perforation, retraction or middle ear effusion.   Left tympanic membrane clear and mobile without evidence of perforation, retraction or middle ear effusion.     NOSE:   No external deformity. No external nasal lesions, lacerations, or scars. Nasal tip symmetrical with normal nasal valves.   Nasal cavity with essentially midline septum, edematous mucosa and turbinates. No lesions, masses, purulence or polyps.     OC/OP:   Mucous membranes moist, no masses, lesions or exudates.   Normal tongue, floor of mouth, teeth, gums, lips. Normal posterior pharyngeal wall.    Normal tonsils without erythema, exudate or obvious calculi     NECK:   No neck masses or thyroid enlargement. Trachea midline. No tenderness to palpation    LYMPHATIC:   No cervical lymphadenopathy.     RESPIRATORY:   Symmetric chest elevation & no retractions. No significant hoarseness. No increased work of breathing.    CV:   No clubbing or cyanosis. No obvious edema    Skin:   No facial rashes, vesicles or lesions.     Extremities:   No gross abnormalities      Clinic Procedure    Binocular microscopy exam  Indication: tympanic membrane(s) could not be visualized adequately with handheld otoscopy.   Location:  bilateral ears  Visualization Instrument: A  microscope was used to visualize through a speculum placed in the ear canal(s) to visualize the ear canal, tympanic membranes and to assist in assessment and removal of debris.  Findings:  see physical exam documentation  Patient Status: The patient tolerated the procedure well.   Complications: There were no complications.     Information review:  External sources (notes, imaging, lab results) listed below personally reviewed to aid in medical decision making process.  -ED 7/3/25  -Pulm 7/10/25  -         [1] No family history on file.

## 2025-07-24 ENCOUNTER — OFFICE VISIT (OUTPATIENT)
Dept: SURGERY | Facility: HOSPITAL | Age: 50
End: 2025-07-24
Payer: COMMERCIAL

## 2025-07-24 VITALS
TEMPERATURE: 96.6 F | RESPIRATION RATE: 16 BRPM | HEART RATE: 103 BPM | OXYGEN SATURATION: 100 % | DIASTOLIC BLOOD PRESSURE: 102 MMHG | SYSTOLIC BLOOD PRESSURE: 146 MMHG

## 2025-07-24 DIAGNOSIS — J90 PLEURAL EFFUSION: Primary | ICD-10-CM

## 2025-07-24 PROCEDURE — 99205 OFFICE O/P NEW HI 60 MIN: CPT | Performed by: STUDENT IN AN ORGANIZED HEALTH CARE EDUCATION/TRAINING PROGRAM

## 2025-07-24 PROCEDURE — 3080F DIAST BP >= 90 MM HG: CPT | Performed by: STUDENT IN AN ORGANIZED HEALTH CARE EDUCATION/TRAINING PROGRAM

## 2025-07-24 PROCEDURE — 99215 OFFICE O/P EST HI 40 MIN: CPT | Performed by: STUDENT IN AN ORGANIZED HEALTH CARE EDUCATION/TRAINING PROGRAM

## 2025-07-24 PROCEDURE — 3077F SYST BP >= 140 MM HG: CPT | Performed by: STUDENT IN AN ORGANIZED HEALTH CARE EDUCATION/TRAINING PROGRAM

## 2025-07-24 ASSESSMENT — PAIN SCALES - GENERAL: PAINLEVEL_OUTOF10: 0-NO PAIN

## 2025-07-26 LAB
CALPROTECTIN STL-MCNT: 156 MCG/G
CHLORIDE STL-SCNC: 14 MEQ/L
ELASTASE PANC STL-MCNT: >800 MCG/G
POTASSIUM STL-SCNC: 35.7 MEQ/L
SODIUM STL-SCNC: NORMAL MMOL/L

## 2025-07-28 NOTE — PROGRESS NOTES
Chief complaint:  Recurrent right pleural effusion    History Of Present Illness  Soren Howard is a 50 y.o. male, never smoker, presenting with a recurrent right pleural effusion. Patient was referred by Maria Alejandra Wesley, Pulmonology.    Patient has a h/o ESRD, followed by Dr. Charles (M, W, F).  Patient was admitted to the hospital in May, on 5/9 underwent US guided thoracentesis 450ml (exudative). He denies any wheezing, SOB at rest, CP, or GERD, cough. He has SEAMAN with walking long distances/ going up stairs. Patient and SO report this SEAMAN is not new or worsening, has been present for months. He denies any unexplained weight loss.     No history of MI or CVA. Could walk a mile or climb 2 flights of stairs: yes     Past Medical History  He has a past medical history of Angiodysplasia of stomach and duodenum without bleeding, COVID-19, Diverticulosis of intestine, part unspecified, without perforation or abscess without bleeding, End stage renal disease (Multi) (08/02/2021), Other hemorrhoids, Personal history of diseases of the blood and blood-forming organs and certain disorders involving the immune mechanism, Personal history of other diseases of the circulatory system, Personal history of other diseases of the musculoskeletal system and connective tissue, Personal history of other endocrine, nutritional and metabolic disease, Personal history of other medical treatment, Residual hemorrhoidal skin tags, Smoker, Ulcer of anus and rectum, and Unspecified osteoarthritis, unspecified site.    Surgical History  He has a past surgical history that includes Other surgical history (01/18/2022); Other surgical history (01/19/2022); CT angio abdomen pelvis w and or wo IV IV contrast (5/24/2023); and IR venogram dialysis (5/26/2023).     Social History  He reports that he has never smoked. He has never used smokeless tobacco. He reports current alcohol use. He reports that he does not use drugs.    Family History  Family  history of cancer: N/A  Family History[1]     Medications  Current Medications[2]    Allergies  Piperacillin-tazobactam-dextrs    Review of Systems:  Review of Systems   Constitutional: No fevers, chills, unexpected weight change  HENT: No sore throat, congestion, or nasal drainage  Eyes: No visual changes or eye itching  Respiratory: see HPI. No cough, worsening dyspnea, wheezing  Cardiac: No chest pain, palpitations, or lower extremity edema  Gastrointestinal: No nausea, vomiting, diarrhea. No abdominal pain  Genitourinary: No dysuria or hematuria  Musculoskeletal: No back pain. No significant myalgias or arthralgias  Neurologic: No headaches, dizziness, or seizures.  Hematologic: No easy bleeding or bruising.  Psychiatric: No anxiety or depression.    Physical Exam:  Physical Exam  BP (!) 146/102 (BP Location: Right arm, Patient Position: Sitting, BP Cuff Size: Adult)   Pulse 103   Temp 35.9 °C (96.6 °F) (Temporal)   Resp 16   SpO2 100%   Constitutional:       General: Patient is not in acute distress.     Appearance: Normal appearance; not ill-appearing.   HENT:      Head: Normocephalic.      Nose: No congestion or rhinorrhea.   Cardiovascular:      Rate and Rhythm: Normal rate and regular rhythm.      Pulses: Normal pulses.   Pulmonary:      Effort: Pulmonary effort is normal. No respiratory distress.  No conversational dyspnea     Breath sounds: No stridor. No wheezing.   Abdominal:      General: There is no distension.      Palpations: Abdomen is soft.      Tenderness: There is no abdominal tenderness.   Musculoskeletal:         General: No swelling, tenderness or deformity. Normal range of motion. (+) Thrill LUE AVF     Cervical back: Normal range of motion. No rigidity.   Lymphadenopathy:      Cervical: No cervical adenopathy.   Skin:     General: Skin is warm and dry.   Neurological:      General: No focal deficit present.      Mental Status: Patient is alert and oriented to person, place, and time.  "  Psychiatric:         Mood and Affect: Mood normal.     Relevant Results    Pathology:  N/a     Imaging:    Imaging  No results found.    Cardiology, Vascular, and Other Imaging  No other imaging results found for the past 7 days       Pulmonary Functions Testing Results:    No results found for: \"FEV1\", \"FVC\", \"ANE4LZK\", \"TLC\", \"DLCO\"    CXR and CT imaging personally reviewed     Assessment/Plan   Problem List Items Addressed This Visit           ICD-10-CM    Pleural effusion - Primary J90       Mr. Howard is a pleasant 48 yo male, h/o ESRD, with a recurrent right pleural effusion. 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 This could have initially been caused by his ESRD or from a parapneumonic effusion vs other. We discussed options for this including surgical decortication vs PleurX cathter. Patient reports that he is currently minimally symptomatic (SEAMAN) and right now does not want to pursue any interventions unless not dealing with this will inhibit his ongoing renal transplant work up.   I have low suspicion for any malignancy, if needed can always repeat thora and send cytology.        I spent 65 minutes in the professional and overall care of this patient.      Natty Hagen, DO  Thoracic & Esophageal Surgery          [1] No family history on file.  [2]   Current Outpatient Medications:     acetaminophen (Tylenol) 325 mg tablet, Take by mouth every 4 hours if needed., Disp: , Rfl:     amLODIPine (Norvasc) 10 mg tablet, Take 1 tablet (10 mg) by mouth once daily., Disp: 30 tablet, Rfl: 7    azithromycin (Zithromax) 250 mg tablet, TAKE 2 TABLETS BY MOUTH ON DAY 1, AND THEN TAKE 1 TABLET BY MOUTH ONCE A DAY ON DAY 2 THROUGH DAY 5, Disp: , Rfl:     carvedilol (Coreg) 12.5 mg tablet, Take by mouth twice a day., Disp: , Rfl:     carvedilol (Coreg) 25 mg tablet, Take 1 tablet (25 mg) by mouth every 12 hours., Disp: 60 tablet, Rfl: 7    cetirizine (ZyrTEC) 10 mg " tablet, Take 1 tablet (10 mg) by mouth once daily as needed for allergies., Disp: 30 tablet, Rfl: 2    clindamycin (Cleocin) 150 mg capsule, Take 2 capsules (300 mg) by mouth., Disp: , Rfl:     fluticasone (Flonase) 50 mcg/actuation nasal spray, INSTILL 1 SPRAY IN EACH NOSTRIL BY THE NASAL ROUTE TWICE DAILY IN THE MORNING AND NIGHT FOR ONE WEEK FOR RUNNING NOSE AND NASAL CONGESTION, Disp: , Rfl:     hydrALAZINE (Apresoline) 100 mg tablet, Take 1 tablet (100 mg) by mouth 2 times a day., Disp: 60 tablet, Rfl: 10    hydrALAZINE (Apresoline) 100 mg tablet, Take by mouth twice a day., Disp: , Rfl:     loperamide (Imodium) 2 mg capsule, Take 2 capsules (4 mg) by mouth 2 times a day., Disp: , Rfl:     losartan (Cozaar) 25 mg tablet, Take 1 tablet (25 mg) by mouth once daily., Disp: 30 tablet, Rfl: 7    meclizine (Antivert) 12.5 mg tablet, Take 1 tablet (12.5 mg) by mouth every 8 hours if needed for dizziness., Disp: , Rfl:     polyethylene glycol (GoLYTELY) 236-22.74-6.74 -5.86 gram solution, , Disp: , Rfl:     predniSONE (Deltasone) 20 mg tablet, Take 1 tablet (20 mg) by mouth early in the morning.., Disp: , Rfl:     pseudoephedrine (Sudafed) 30 mg tablet, Take 1 tablet (30 mg) by mouth every 6 hours if needed for congestion., Disp: , Rfl:     psyllium (Metamucil) powder, Take 1 Dose (5.8 g) by mouth once daily., Disp: 283 g, Rfl: 12    sildenafil (Viagra) 25 mg tablet, Take 1 tablet (25 mg) by mouth once daily as needed., Disp: , Rfl:     sildenafil (Viagra) 50 mg tablet, Take 1 tablet (50 mg) by mouth once daily as needed for erectile dysfunction., Disp: 30 tablet, Rfl: 1    tadalafil (Cialis) 10 mg tablet, Take by mouth., Disp: , Rfl:     torsemide (Demadex) 20 mg tablet, Take 1 tablet (20 mg) by mouth once daily., Disp: , Rfl:     triamcinolone (Nasacort) 55 mcg nasal inhaler, Administer 2 sprays into each nostril once daily., Disp: 16.5 g, Rfl: 3

## 2025-08-22 DIAGNOSIS — K52.9 CHRONIC DIARRHEA: Primary | ICD-10-CM

## 2025-08-22 RX ORDER — LOPERAMIDE HYDROCHLORIDE 2 MG/1
4 CAPSULE ORAL 2 TIMES DAILY
Qty: 30 CAPSULE | Refills: 10 | Status: SHIPPED | OUTPATIENT
Start: 2025-08-22

## 2025-09-25 ENCOUNTER — APPOINTMENT (OUTPATIENT)
Dept: GASTROENTEROLOGY | Facility: HOSPITAL | Age: 50
End: 2025-09-25
Payer: COMMERCIAL